# Patient Record
Sex: FEMALE | Race: WHITE | Employment: OTHER | URBAN - NONMETROPOLITAN AREA
[De-identification: names, ages, dates, MRNs, and addresses within clinical notes are randomized per-mention and may not be internally consistent; named-entity substitution may affect disease eponyms.]

---

## 2017-01-02 ENCOUNTER — COMMUNICATION - GICH (OUTPATIENT)
Dept: FAMILY MEDICINE | Facility: OTHER | Age: 67
End: 2017-01-02

## 2017-01-02 DIAGNOSIS — K21.9 GASTRO-ESOPHAGEAL REFLUX DISEASE WITHOUT ESOPHAGITIS: ICD-10-CM

## 2017-01-06 ENCOUNTER — OFFICE VISIT - GICH (OUTPATIENT)
Dept: FAMILY MEDICINE | Facility: OTHER | Age: 67
End: 2017-01-06

## 2017-01-06 ENCOUNTER — HISTORY (OUTPATIENT)
Dept: FAMILY MEDICINE | Facility: OTHER | Age: 67
End: 2017-01-06

## 2017-01-06 DIAGNOSIS — H69.90: ICD-10-CM

## 2017-01-06 DIAGNOSIS — F33.9 RECURRENT MAJOR DEPRESSIVE DISORDER (H): ICD-10-CM

## 2017-01-06 DIAGNOSIS — Z91.89 OTHER SPECIFIED PERSONAL RISK FACTORS, NOT ELSEWHERE CLASSIFIED: ICD-10-CM

## 2017-01-06 DIAGNOSIS — G47.9 SLEEP DISORDER: ICD-10-CM

## 2017-01-06 DIAGNOSIS — Z00.00 ENCOUNTER FOR GENERAL ADULT MEDICAL EXAMINATION WITHOUT ABNORMAL FINDINGS: ICD-10-CM

## 2017-01-06 DIAGNOSIS — E78.2 MIXED HYPERLIPIDEMIA: ICD-10-CM

## 2017-01-06 DIAGNOSIS — Z23 ENCOUNTER FOR IMMUNIZATION: ICD-10-CM

## 2017-01-06 LAB
A/G RATIO - HISTORICAL: 1.8 (ref 1–2)
ALBUMIN SERPL-MCNC: 4.4 G/DL (ref 3.5–5.7)
ALP SERPL-CCNC: 67 IU/L (ref 34–104)
ALT (SGPT) - HISTORICAL: 25 IU/L (ref 7–52)
ANION GAP - HISTORICAL: 8 (ref 5–18)
AST SERPL-CCNC: 22 IU/L (ref 13–39)
BILIRUB SERPL-MCNC: 0.9 MG/DL (ref 0.3–1)
BUN SERPL-MCNC: 16 MG/DL (ref 7–25)
BUN/CREAT RATIO - HISTORICAL: 18
CALCIUM SERPL-MCNC: 9.4 MG/DL (ref 8.6–10.3)
CHLORIDE SERPLBLD-SCNC: 105 MMOL/L (ref 98–107)
CHOL/HDL RATIO - HISTORICAL: 3.55
CHOLESTEROL TOTAL: 206 MG/DL
CO2 SERPL-SCNC: 28 MMOL/L (ref 21–31)
CREAT SERPL-MCNC: 0.91 MG/DL (ref 0.7–1.3)
GFR IF NOT AFRICAN AMERICAN - HISTORICAL: >60 ML/MIN/1.73M2
GLOBULIN - HISTORICAL: 2.4 G/DL (ref 2–3.7)
GLUCOSE SERPL-MCNC: 85 MG/DL (ref 70–105)
HDLC SERPL-MCNC: 58 MG/DL (ref 23–92)
LDLC SERPL CALC-MCNC: 123 MG/DL
NON-HDL CHOLESTEROL - HISTORICAL: 148 MG/DL
PATIENT STATUS - HISTORICAL: ABNORMAL
POTASSIUM SERPL-SCNC: 4 MMOL/L (ref 3.5–5.1)
PROT SERPL-MCNC: 6.8 G/DL (ref 6.4–8.9)
SODIUM SERPL-SCNC: 141 MMOL/L (ref 133–143)
TRIGL SERPL-MCNC: 125 MG/DL

## 2017-01-06 ASSESSMENT — PATIENT HEALTH QUESTIONNAIRE - PHQ9: SUM OF ALL RESPONSES TO PHQ QUESTIONS 1-9: 1

## 2017-02-03 ENCOUNTER — COMMUNICATION - GICH (OUTPATIENT)
Dept: FAMILY MEDICINE | Facility: OTHER | Age: 67
End: 2017-02-03

## 2017-05-01 ENCOUNTER — AMBULATORY - GICH (OUTPATIENT)
Dept: SCHEDULING | Facility: OTHER | Age: 67
End: 2017-05-01

## 2017-06-01 ENCOUNTER — COMMUNICATION - GICH (OUTPATIENT)
Dept: FAMILY MEDICINE | Facility: OTHER | Age: 67
End: 2017-06-01

## 2017-06-01 DIAGNOSIS — F33.9 RECURRENT MAJOR DEPRESSIVE DISORDER (H): ICD-10-CM

## 2017-06-05 ENCOUNTER — COMMUNICATION - GICH (OUTPATIENT)
Dept: FAMILY MEDICINE | Facility: OTHER | Age: 67
End: 2017-06-05

## 2017-06-05 DIAGNOSIS — F33.9 RECURRENT MAJOR DEPRESSIVE DISORDER (H): ICD-10-CM

## 2017-06-09 ENCOUNTER — COMMUNICATION - GICH (OUTPATIENT)
Dept: FAMILY MEDICINE | Facility: OTHER | Age: 67
End: 2017-06-09

## 2017-07-03 ENCOUNTER — COMMUNICATION - GICH (OUTPATIENT)
Dept: FAMILY MEDICINE | Facility: OTHER | Age: 67
End: 2017-07-03

## 2017-07-03 DIAGNOSIS — H69.90: ICD-10-CM

## 2017-09-23 ENCOUNTER — COMMUNICATION - GICH (OUTPATIENT)
Dept: FAMILY MEDICINE | Facility: OTHER | Age: 67
End: 2017-09-23

## 2017-09-23 DIAGNOSIS — K21.9 GASTRO-ESOPHAGEAL REFLUX DISEASE WITHOUT ESOPHAGITIS: ICD-10-CM

## 2017-11-03 ENCOUNTER — HISTORY (OUTPATIENT)
Dept: RADIOLOGY | Facility: OTHER | Age: 67
End: 2017-11-03

## 2017-11-03 ENCOUNTER — HOSPITAL ENCOUNTER (OUTPATIENT)
Dept: RADIOLOGY | Facility: OTHER | Age: 67
End: 2017-11-03
Attending: FAMILY MEDICINE

## 2017-11-03 ENCOUNTER — AMBULATORY - GICH (OUTPATIENT)
Dept: FAMILY MEDICINE | Facility: OTHER | Age: 67
End: 2017-11-03

## 2017-11-03 DIAGNOSIS — Z12.31 ENCOUNTER FOR SCREENING MAMMOGRAM FOR MALIGNANT NEOPLASM OF BREAST: ICD-10-CM

## 2017-11-03 DIAGNOSIS — Z23 ENCOUNTER FOR IMMUNIZATION: ICD-10-CM

## 2017-12-23 ENCOUNTER — COMMUNICATION - GICH (OUTPATIENT)
Dept: FAMILY MEDICINE | Facility: OTHER | Age: 67
End: 2017-12-23

## 2017-12-23 DIAGNOSIS — K21.9 GASTRO-ESOPHAGEAL REFLUX DISEASE WITHOUT ESOPHAGITIS: ICD-10-CM

## 2017-12-27 NOTE — PROGRESS NOTES
Patient Information     Patient Name MRN Sex Afia Cates [id number] Female 1950      Progress Notes by Chantal Cox at 11/3/2017 10:14 AM     Author:  Chantal Cox Service:  (none) Author Type:  (none)     Filed:  11/3/2017 10:14 AM Date of Service:  11/3/2017 10:14 AM Status:  Signed     :  Chantal Cox            Falls Risk Criteria:    Age 65 and older or under age 4        Sensory deficits    Poor vision    Use of ambulatory aides    Impaired judgment    Unable to walk independently    Meets High Risk criteria for falls:  Yes               1.  Do you have dizziness or vertigo?    no                    2.  Do you need help standing or walking?   no                 3.  Have you fallen within the last 6 months?    no           4.  Has the patient been fasting?      no       If any risks are marked Yes, the following interventions are utilized:    Do not leave patient unattended     Assist patient in the dressing room and bathroom    Have ambulatory aides available throughout procedure    Involve patient s family if available

## 2017-12-28 NOTE — TELEPHONE ENCOUNTER
Patient Information     Patient Name MRN Afia Hardy [id number] Female 1950      Telephone Encounter by Vlad Luevano RN at 2017 10:59 AM     Author:  Vlad Luevano RN Service:  (none) Author Type:  NURS- Registered Nurse     Filed:  2017 11:04 AM Encounter Date:  2017 Status:  Signed     :  Vlad Luevano RN (NURS- Registered Nurse)            Refill request received from [facility] for patient's Ambien. Chart review shows that rx was filled as noted below and wouldn't be due for refill until 17.    Prescribing Provider: Joan Joshi MD            Order Date: 2017  Ordered by: JOAN JOSHI  Medication:zolpidem (AMBIEN) 10 mg tablet    Qty:90 tablet   Ref:1  Start:2017    End:              Route:Oral                  RUBEN:No   Class:Print    Sig:Take 1 tablet by mouth at bedtime if needed for Sleep.    Pharmacy:[place] [address] [place] [address]    Faxed rx request from pharmacy also shows a last fill date of 16. Call placed to [facility]. Spoke to radames Hernandez. radames Hernandez notes rx as listed above. Is able to fill rx as requested by patient off of script as noted above. radames Hernandez states nothing further needed at this time. Writer will disregard rx request at this time and close this encounter.    Unable to complete prescription refill per RN Medication Refill Policy.................... Vlad Luevano RN ....................  2017   11:03 AM

## 2017-12-28 NOTE — TELEPHONE ENCOUNTER
Patient Information     Patient Name MRN Sex Afia Cates [id number] Female 1950      Telephone Encounter by She Danielle RN at 7/3/2017  9:43 AM     Author:  She Danielle RN Service:  (none) Author Type:  NURS- Registered Nurse     Filed:  7/3/2017  9:46 AM Encounter Date:  7/3/2017 Status:  Signed     :  She Danielle RN (NURS- Registered Nurse)            This is a Refill request from: [facility]  Name of Medication: flonase  Quantity requested: 16gm  Last fill date: 17  Due for refill: unsure  Last visit with JOAN JOSHI was on: 2017 in [facility]  PCP:  Joan Joshi MD  Controlled Substance Agreement:  na   Diagnosis r/t this medication request: eustachian tube dysfunction    Patient was given one bottle at PX 17 and told to use at bedtime for 3-4 weeks. Now requesting refill on medication to Joan Joshi MD to advise     Unable to complete prescription refill per RN Medication Refill Policy.................... SHE DANIELLE RN ....................  7/3/2017   9:43 AM

## 2017-12-28 NOTE — TELEPHONE ENCOUNTER
Patient Information     Patient Name MRN Sex Afia Cates [id number] Female 1950      Telephone Encounter by Margarita Campbell RN at 2017 11:53 AM     Author:  Margarita Campbell RN Service:  (none) Author Type:  NURS- Registered Nurse     Filed:  2017 11:58 AM Encounter Date:  2017 Status:  Signed     :  Margarita Campbell RN (NURS- Registered Nurse)            Refill request inappropriate. Filled 17 90 x 3. Pharmacy alerted. Unable to complete prescription refill per RN Medication Refill Policy.................... Margarita Campbell RN ....................  2017   11:56 AM    Prescribing Provider: Joan Joshi MD            Order Date: 2017  Ordered by: JOAN JOSHI  Medication:citalopram (CELEXA) 40 mg tablet    Qty:90 tablet   Ref:3  Start:2017    End:              Route:Oral                  RUBEN:No   Class:eRx    Sig:Take 1 tablet by mouth at bedtime.    Pharmacy:[facility] [address] [place] [address]

## 2017-12-28 NOTE — TELEPHONE ENCOUNTER
Patient Information     Patient Name MRN Sex Afia Cates [id number] Female 1950      Telephone Encounter by Vlad Luevano RN at 2017  9:22 AM     Author:  Vlad Luevano RN Service:  (none) Author Type:  NURS- Registered Nurse     Filed:  2017  9:26 AM Encounter Date:  2017 Status:  Signed     :  Vlad Luevano RN (NURS- Registered Nurse)            Proton Pump Inhibitors    Office visit in the past 12 months or per provider note.    Last visit with HUE JOSHI was on: 2017 in [facility]  Next visit with HUE JOSHI is on: No future appointment listed with this provider  Next visit with Family Practice is on: No future appointment listed in this department    Max refill for 12 months from last office visit or per provider note.    Chart review shows that patient was seen for a physical with PCP on 17. Rx as requested was noted and reviewed in office visit notes on that date. No changes noted with rx as requested. Patient will be due for annual office visit in 2018. Writer will refill rx as requested for a 90 day supply at this time.    Prescription refilled per RN Medication Refill Policy.................... Vlad Luevano RN ....................  2017   9:25 AM

## 2017-12-28 NOTE — TELEPHONE ENCOUNTER
Patient Information     Patient Name MRN Sex Afia Cates [id number] Female 1950      Telephone Encounter by Margarita Campbell RN at 2017 10:31 AM     Author:  Margarita Campbell RN Service:  (none) Author Type:  NURS- Registered Nurse     Filed:  2017 10:34 AM Encounter Date:  2017 Status:  Signed     :  Margarita Campbell RN (NURS- Registered Nurse)            Refill request inappropriate. Filled 17 90 x 3. Pharmacy alerted. Unable to complete prescription refill per RN Medication Refill Policy.................... Margarita Campbell RN ....................  2017   10:34 AM    Prescribing Provider: Joan Joshi MD            Order Date: 2017  Ordered by: JOAN JOSHI  Medication:citalopram (CELEXA) 40 mg tablet    Qty:90 tablet   Ref:3  Start:2017    End:              Route:Oral                  RUBEN:No   Class:eRx    Sig:Take 1 tablet by mouth at bedtime.    Pharmacy:[facility] [address] [place] [address]

## 2018-01-02 NOTE — NURSING NOTE
Patient Information     Patient Name MRN Sex Afia Cates [id number] Female 1950      Nursing Note by Estefanía Hastings at 2017 10:00 AM     Author:  Estefanía Hastings Service:  (none) Author Type:  (none)     Filed:  2017 10:19 AM Encounter Date:  2017 Status:  Signed     :  Estefanía Hastings            Patient is here for annual physical, and right ear/jaw is bothering her. States hurts when she opens her mouth.  Estefanía Hastings LPN .............2017  9:54 AM

## 2018-01-02 NOTE — PATIENT INSTRUCTIONS
Patient Information     Patient Name MRN Sex Afia Cates [id number] Female 1950      Patient Instructions by Joan Velazquez MD at 2017 10:00 AM     Author:  Joan Velazquez MD Service:  (none) Author Type:  Physician     Filed:  2017 10:40 AM Encounter Date:  2017 Status:  Signed     :  Joan Velazquez MD (Physician)            Labs today  Will determine dosage of zocor based on lipid panel today  Recommend pneumonia vaccine and booster next year  Schedule mammogram in the spring  Increase weight bearing exercise, 30 minutes daily of moderate paced walking  Left middle ear effusion, recommend flonase nasal spray 2 sprays at bedtime x 3-4 weeks

## 2018-01-02 NOTE — TELEPHONE ENCOUNTER
Patient Information     Patient Name MRN Afia Hardy [id number] Female 1950      Telephone Encounter by Vlad Luevano RN at 2017  2:54 PM     Author:  Vlad Luevano RN Service:  (none) Author Type:  NURS- Registered Nurse     Filed:  2017  2:58 PM Encounter Date:  2017 Status:  Signed     :  Vlad Luevano RN (NURS- Registered Nurse)            Proton Pump Inhibitors    Office visit in the past 12 months or per provider note.    Last visit with HUE JOSHI was on: 10/14/2016 in [facility]  Next visit with HUE JOSHI is on: 2017 in [facility]  Next visit with Family Practice is on: 2017 in [facility]    Max refill for 12 months from last office visit or per provider note.    Rx was initiated per chart review on 10/7/2016. Will refill as indicated.    Prescription refilled per RN Medication Refill Policy.................... Vlad Luevano RN ....................  2017   2:56 PM

## 2018-01-02 NOTE — NURSING NOTE
Patient Information     Patient Name MRN Sex Afia Cates [id number] Female 1950      Nursing Note by Estefanía Hastings at 2017 10:00 AM     Author:  Estefanía Hastings Service:  (none) Author Type:  (none)     Filed:  2017 10:19 AM Encounter Date:  2017 Status:  Signed     :  Estefanía Hastings            Patient presents today for WELCOME to MEDICARE physical.      1. Have you fallen in the past 12 months? no  2. Do you currently drive an automobile? YES  3. If yes, have you had any accidents in the past 12 months? no        Estefanía Hastings LPN .............2017  9:54 AM

## 2018-01-02 NOTE — PROGRESS NOTES
Patient Information     Patient Name MRN Sex     Afia Moise [id number] Female 1950      Progress Notes by Joan Velazquez MD at 2017 10:00 AM     Author:  Joan Velazquez MD Service:  (none) Author Type:  Physician     Filed:  2017 11:57 AM Encounter Date:  2017 Status:  Signed     :  Joan Velazquez MD (Physician)            ANNUAL EXAM ADULT FEMALE - GYN    Afia Moise is a 66 y.o. female, G 2, P 2, here today for her annual gynecologic exam.  HPI:  Presents for annual exam and review of medical problems. Complains of bilateral ear pressure, worse x 2 weeks. Had cold earlier this winter and those symptoms have resolved.  She has decreased zocor last year and needs repeat lipids today.  [place]  She takes ca/vit d    CURRENT MEDICATIONS:  Current Outpatient Prescriptions       Medication  Sig Dispense Refill     aspirin (ECOTRIN) 81 mg enteric coated tablet Take 1 tablet by mouth once daily with a meal.  0     Calcium-Cholecalciferol, D3, (CALTRATE 600 + D) 600 mg(1,500mg) -400 unit Chew Take 1 tablet by mouth once daily.       cholecalciferol (VITAMIN D) 1,000 unit tablet Take 1,000 Units by mouth once daily.       citalopram (CELEXA) 40 mg tablet Take 1 tablet by mouth at bedtime. 90 tablet 3     fluticasone (50 mcg per actuation) nasal solution (FLONASE) Inhale 1 Spray into both nostrils once daily. 1 Bottle 0     glucosamine-chondroit-vit c-mn (GLUCOSAMINE-CHONDROITIN COMPLX) 500-400 mg cap Take one tablet by mouth daily       metroNIDAZOLE (METROGEL) 0.75 % gel Apply  topically to affected area(s) 2 times daily. 45 g 11     omega-3 fatty acids-vitamin E (FISH OIL) 1,000 mg cap Take 1 capsule by mouth once daily.  0     omeprazole (PRILOSEC) 20 mg Delayed-Release capsule Take one capsule by mouth once daily before a meal. 90 capsule 2     simvastatin (ZOCOR) 10 mg tablet Take 1 tablet by mouth at  bedtime. 90 tablet 3     triamcinolone (ARISTOCORT; KENALOG) 0.1 % cream Apply  topically to affected area(s) 3 times daily. 1 Tube 0     zolpidem (AMBIEN) 10 mg tablet Take 1 tablet by mouth at bedtime if needed for Sleep. 90 tablet 1     No current facility-administered medications for this visit.      Medications have been reviewed by me and are current to the best of my knowledge and ability.      ALLERGIES:  Review of patient's allergies indicates no known allergies.     Past Medical History      Diagnosis   Date     Basal cell carcinoma of nose       Dyslipidemia       lowered zocor 2015      Osteopenia  11/2014     DEXA,  t score -1.7, ca/vit d      Seasonal affective disorder (SAD)       Sleep disorder         Past Surgical History       Procedure   Laterality Date     Partial hysterectomy        secondary to DUB. Ovaries in place. was on HRT, stopped patch in 7/2011       Colonoscopy screening   2003     diverticulosis       Colonoscopy screening   4/24/2014               SOCIAL HISTORY:  Social History     Social History        Marital status:       Spouse name: N/A     Number of children:  N/A     Years of education:  N/A     Occupational History      Not on file.     Social History Main Topics        Smoking status:  Never Smoker     Smokeless tobacco:  Never Used     Alcohol use  No     Drug use:  No     Sexual activity:  Not Currently     Partners: Male     Other Topics  Concern      Service No     Blood Transfusions No     Caffeine Concern No     Occupational Exposure No     Hobby Hazards No     Sleep Concern No     Stress Concern No     Weight Concern No     Special Diet No     Back Care No     Exercise Yes     Bike Helmet No     Seat Belt Yes     Self-Exams Yes     Social History Narrative     , remarried in 5/10, moved into his house 10/10,  2 adult children, 5 grandchildren, works as  of [employer].  Enjoys working with flowers,  mentee in  "[employer].      3 yr twin granddaughter live in [place].                                                        Family History       Problem   Relation Age of Onset     Diabetes  Father      Hypertension  Father      Heart Disease  Father      Osteoporosis  Mother      compression fracture       Heart Disease  Mother      Other  Other      negative breast cancer       Cancer-breast  No Family History        RISK FACTORS  Social History     Substance Use Topics       Smoking status: Never Smoker     Smokeless tobacco: Never Used     Alcohol use No      Exercise Times/wk: 3  Seat Belt Use: 100%  Birth Control Method: none  High Risk/Behavior: none    PREVENTIVE SERVICES  Preventive services were reviewed today, January 12, 2017.    LMP: No LMP recorded. Patient has had a hysterectomy.  Menstrual Regularity: previous hysterectomy  Flow: not applicable    ROS  Negative for Review of Systems not covered in the HPI.    PHYSICAL EXAM  /84  Pulse 78  Ht 1.6 m (5' 3\")  Wt 70.9 kg (156 lb 6.4 oz)  BMI 27.71 kg/m2  General Appearance:  Alert, appropriate appearance for age. No acute distress  HEENT Exam:  Head:  Normocephalic, no lesions, without obvious abnormality.  Eye:  Normal external eye, conjunctiva, lids cornea, CAPO.  Ears:  ear pain, bilateral, TM retracted bilaterally  Nose:  Normal external nose, mucus membranes and septum.  Neck / Thyroid:  Supple, no masses, nodes, nodules or enlargement.  Neck / Thyroid Exam:  Supple, no masses, nodes or enlargement.  Chest/Respiratory Exam: Normal chest wall and respirations. Clear to auscultation.  Breast Exam: No dimpling, nipple retraction or discharge. No masses or nodes.  Cardiovascular Exam: Regular rate and rhythm. S1, S2, no murmur, click, gallop, or rubs.  Gastrointestinal Exam: Soft, non-tender, no masses or organomegaly.  Lymphatic Exam: Non-palpable nodes in neck, clavicular, axillary, or inguinal regions.  Musculoskeletal Exam: Back is straight and non-tender, full " ROM of upper and lower extremities.  Skin: no rash or abnormalities  Neurologic Exam: Normal gait and speech, no tremor.  Psychiatric Exam: Alert and oriented, appropriate affect.  Clinical staff offered to be present for exam: yes, initials of staff present:   PHQ Depression Screening 1/6/2017   Date of PHQ exam (doc flow) 1/6/2017   1. Lack of interest/pleasure 0 - Not at all   2. Feeling down/depressed 0 - Not at all   PHQ-2 TOTAL SCORE 0   3. Trouble sleeping 1 - Several days   4. Decreased energy 0 - Not at all   5. Appetite change 0 - Not at all   6. Feelings of failure 0 - Not at all   7. Trouble concentrating 0 - Not at all   8. Activity level 0 - Not at all   9. Hurting yourself 0 - Not at all   PHQ-9 TOTAL SCORE 1   PHQ-9 Severity Level none   Functional Impairment not difficult at all     Results for orders placed or performed in visit on 01/06/17       LIPID PANEL       Result  Value Ref Range Status    CHOLESTEROL,TOTAL 206 (H) <200 mg/dL Final    TRIGLYCERIDES 125 <150 mg/dL Final    HDL CHOLESTEROL 58 23 - 92 mg/dL Final    NON-HDL CHOLESTEROL 148 (H) <145 mg/dl Final    CHOL/HDL RATIO            3.55 <4.50                 Final    LDL CHOLESTEROL 123 (H) <100 mg/dL Final    PATIENT STATUS            FASTING                 Final   COMP METABOLIC PANEL       Result  Value Ref Range Status    SODIUM 141 133 - 143 mmol/L Final    POTASSIUM 4.0 3.5 - 5.1 mmol/L Final    CHLORIDE 105 98 - 107 mmol/L Final    CO2,TOTAL 28 21 - 31 mmol/L Final    ANION GAP 8 5 - 18                 Final    GLUCOSE 85 70 - 105 mg/dL Final    CALCIUM 9.4 8.6 - 10.3 mg/dL Final    BUN 16 7 - 25 mg/dL Final    CREATININE 0.91 0.70 - 1.30 mg/dL Final    BUN/CREAT RATIO           18                 Final    GFR if African American >60 >60 ml/min/1.73m2 Final    GFR if not African American >60 >60 ml/min/1.73m2 Final    ALBUMIN 4.4 3.5 - 5.7 g/dL Final    PROTEIN,TOTAL 6.8 6.4 - 8.9 g/dL Final    GLOBULIN                  2.4 2.0  - 3.7 g/dL Final    A/G RATIO 1.8 1.0 - 2.0                 Final    BILIRUBIN,TOTAL 0.9 0.3 - 1.0 mg/dL Final    ALK PHOSPHATASE 67 34 - 104 IU/L Final    ALT (SGPT) 25 7 - 52 IU/L Final    AST (SGOT) 22 13 - 39 IU/L Final         ASSESSMENT/PLAN    ICD-10-CM    1. Annual physical exam Z00.00    2. Mixed dyslipidemia E78.2 LIPID PANEL      COMP METABOLIC PANEL      LIPID PANEL      COMP METABOLIC PANEL      simvastatin (ZOCOR) 10 mg tablet      DISCONTINUED: simvastatin (ZOCOR) 20 mg tablet   3. Disturbance in sleep behavior G47.9 zolpidem (AMBIEN) 10 mg tablet   4. Recurrent major depressive disorder, remission status unspecified F33.9 citalopram (CELEXA) 40 mg tablet   5. Eustachian tube disorder, unspecified laterality H69.90 fluticasone (50 mcg per actuation) nasal solution (FLONASE)   6. Pneumococcal vaccination indicated Z91.89 PNEUMOCOCCAL VACCINE 23-VALENT => 3 YO IM      NH ADMIN VACC INITIAL   7. Encounter for immunization  Z23 PNEUMOCOCCAL VACCINE 23-VALENT => 3 YO IM     Ms. Moise's Body mass index is 27.71 kg/(m^2). This is within the normal range for a 65 y.o. Normal range for ages 18-64 is between 18.5 and 24.9; normal range for ages 65+ is 23-30.   BP Readings from Last 1 Encounters:01/06/17 : 118/84  Ms. Musa blood pressure is out of the normal range for adults. Per JNC-8 guidelines normal adult blood pressure is < 120/80, pre-hypertensive is between 120/80 and 139/89, and hypertension is 140/90 or greater. Risks of hypertension were discussed. Patient's strategy will be increased activity  No longer needs screening pap smears   other preventive cares are current  Patient is counseled on importance of regular self breast exams, annual mammogram starting at the age of 40.  Discussed the importance of calcium and vitamin D supplementation and screening for osteoporosis. Immunizations are updated. Reviewed the importance of sunscreen, seatbelt and helmet use.    Patient Instructions   Labs  today  Will determine dosage of zocor based on lipid panel today  Recommend pneumonia vaccine and booster next year  Schedule mammogram in the spring  Increase weight bearing exercise, 30 minutes daily of moderate paced walking  Left middle ear effusion, recommend flonase nasal spray 2 sprays at bedtime x 3-4 weeks  Joan Raygoza MD  11:52 AM 1/12/2017

## 2018-01-03 NOTE — TELEPHONE ENCOUNTER
Patient Information     Patient Name MRN Sex Afia Cates [id number] Female 1950      Telephone Encounter by Vlad Luevano RN at 2/3/2017  1:07 PM     Author:  Vlad Luevano RN Service:  (none) Author Type:  NURS- Registered Nurse     Filed:  2/3/2017  1:15 PM Encounter Date:  2/3/2017 Status:  Signed     :  Vlad Luevano RN (NURS- Registered Nurse)            Received faxed refill request from pharmacy with regards to patient's simvastatin 20 mg tablets. Last fill date per rx request was on 11/3/2016. Chart review shows that simvastatin 20 mg tabs discontinued at 2017 office visit. Current rx in chart as follows:    Prescribing Provider: Joan Joshi MD          Order Date: 2017  Ordered by: JOAN JOSHI  Medication:simvastatin (ZOCOR) 10 mg tablet    Qty:90 tablet   Ref:3  Start:2017    End:              Route:Oral                  RUBEN:No   Class:eRx    Sig:Take 1 tablet by mouth at bedtime.    [facility]    Call placed to [facility] pharmacy. Spoke with Omar, pharmacist. Advised her of above information. Omar will inactivate rx for 20 mg tabs in chart and fill new rx for 10 mg tabs. No further clarification needed.    Unable to complete prescription refill per RN Medication Refill Policy.................... Vlad Luevano RN ....................  2/3/2017   1:15 PM

## 2018-01-16 ENCOUNTER — HISTORY (OUTPATIENT)
Dept: FAMILY MEDICINE | Facility: OTHER | Age: 68
End: 2018-01-16

## 2018-01-16 ENCOUNTER — OFFICE VISIT - GICH (OUTPATIENT)
Dept: FAMILY MEDICINE | Facility: OTHER | Age: 68
End: 2018-01-16

## 2018-01-16 DIAGNOSIS — F33.9 RECURRENT MAJOR DEPRESSIVE DISORDER (H): ICD-10-CM

## 2018-01-16 DIAGNOSIS — E78.2 MIXED HYPERLIPIDEMIA: ICD-10-CM

## 2018-01-16 DIAGNOSIS — M94.9 DISORDER OF CARTILAGE: ICD-10-CM

## 2018-01-16 DIAGNOSIS — Z00.00 ENCOUNTER FOR GENERAL ADULT MEDICAL EXAMINATION WITHOUT ABNORMAL FINDINGS: ICD-10-CM

## 2018-01-16 DIAGNOSIS — R53.83 OTHER FATIGUE: ICD-10-CM

## 2018-01-16 DIAGNOSIS — Z23 ENCOUNTER FOR IMMUNIZATION: ICD-10-CM

## 2018-01-16 DIAGNOSIS — M89.9 DISORDER OF BONE: ICD-10-CM

## 2018-01-16 DIAGNOSIS — G47.9 SLEEP DISORDER: ICD-10-CM

## 2018-01-16 DIAGNOSIS — L71.9 ROSACEA: ICD-10-CM

## 2018-01-16 LAB
A/G RATIO - HISTORICAL: 1.4 (ref 1–2)
ABSOLUTE BASOPHILS - HISTORICAL: 0.1 THOU/CU MM
ABSOLUTE EOSINOPHILS - HISTORICAL: 0.1 THOU/CU MM
ABSOLUTE IMMATURE GRANULOCYTES(METAS,MYELOS,PROS) - HISTORICAL: 0 THOU/CU MM
ABSOLUTE LYMPHOCYTES - HISTORICAL: 1.4 THOU/CU MM (ref 0.9–2.9)
ABSOLUTE MONOCYTES - HISTORICAL: 0.5 THOU/CU MM
ABSOLUTE NEUTROPHILS - HISTORICAL: 3.1 THOU/CU MM (ref 1.7–7)
ALBUMIN SERPL-MCNC: 4.3 G/DL (ref 3.5–5.7)
ALP SERPL-CCNC: 67 IU/L (ref 34–104)
ALT (SGPT) - HISTORICAL: 13 IU/L (ref 7–52)
ANION GAP - HISTORICAL: 11 (ref 5–18)
AST SERPL-CCNC: 17 IU/L (ref 13–39)
BASOPHILS # BLD AUTO: 1.2 %
BILIRUB SERPL-MCNC: 1 MG/DL (ref 0.3–1)
BUN SERPL-MCNC: 19 MG/DL (ref 7–25)
BUN/CREAT RATIO - HISTORICAL: 21
CALCIUM SERPL-MCNC: 9.1 MG/DL (ref 8.6–10.3)
CHLORIDE SERPLBLD-SCNC: 105 MMOL/L (ref 98–107)
CHOL/HDL RATIO - HISTORICAL: 3.25
CHOLESTEROL TOTAL: 169 MG/DL
CO2 SERPL-SCNC: 23 MMOL/L (ref 21–31)
CREAT SERPL-MCNC: 0.89 MG/DL (ref 0.7–1.3)
EOSINOPHIL NFR BLD AUTO: 2 %
ERYTHROCYTE [DISTWIDTH] IN BLOOD BY AUTOMATED COUNT: 12.2 % (ref 11.5–15.5)
GFR IF NOT AFRICAN AMERICAN - HISTORICAL: >60 ML/MIN/1.73M2
GLOBULIN - HISTORICAL: 3 G/DL (ref 2–3.7)
GLUCOSE SERPL-MCNC: 102 MG/DL (ref 70–105)
HCT VFR BLD AUTO: 44.4 % (ref 33–51)
HDLC SERPL-MCNC: 52 MG/DL (ref 23–92)
HEMOGLOBIN: 15.4 G/DL (ref 12–16)
IMMATURE GRANULOCYTES(METAS,MYELOS,PROS) - HISTORICAL: 0.2 %
LDLC SERPL CALC-MCNC: 96 MG/DL
LYMPHOCYTES NFR BLD AUTO: 26.9 % (ref 20–44)
MCH RBC QN AUTO: 30.9 PG (ref 26–34)
MCHC RBC AUTO-ENTMCNC: 34.7 G/DL (ref 32–36)
MCV RBC AUTO: 89 FL (ref 80–100)
MONOCYTES NFR BLD AUTO: 9.2 %
NEUTROPHILS NFR BLD AUTO: 60.5 % (ref 42–72)
NON-HDL CHOLESTEROL - HISTORICAL: 117 MG/DL
PLATELET # BLD AUTO: 188 THOU/CU MM (ref 140–440)
PMV BLD: 10 FL (ref 6.5–11)
POTASSIUM SERPL-SCNC: 4 MMOL/L (ref 3.5–5.1)
PROT SERPL-MCNC: 7.3 G/DL (ref 6.4–8.9)
PROVIDER ORDERDED STATUS - HISTORICAL: NORMAL
RED BLOOD COUNT - HISTORICAL: 4.98 MIL/CU MM (ref 4–5.2)
SODIUM SERPL-SCNC: 139 MMOL/L (ref 133–143)
TRIGL SERPL-MCNC: 107 MG/DL
TSH - HISTORICAL: 1.1 UIU/ML (ref 0.34–5.6)
WHITE BLOOD COUNT - HISTORICAL: 5.1 THOU/CU MM (ref 4.5–11)

## 2018-01-16 ASSESSMENT — PATIENT HEALTH QUESTIONNAIRE - PHQ9: SUM OF ALL RESPONSES TO PHQ QUESTIONS 1-9: 5

## 2018-01-26 VITALS
WEIGHT: 156.4 LBS | SYSTOLIC BLOOD PRESSURE: 118 MMHG | BODY MASS INDEX: 27.71 KG/M2 | HEART RATE: 78 BPM | HEIGHT: 63 IN | DIASTOLIC BLOOD PRESSURE: 84 MMHG

## 2018-01-29 ENCOUNTER — HOSPITAL ENCOUNTER (OUTPATIENT)
Dept: RADIOLOGY | Facility: OTHER | Age: 68
End: 2018-01-29
Attending: FAMILY MEDICINE

## 2018-01-29 DIAGNOSIS — M94.9 DISORDER OF CARTILAGE: ICD-10-CM

## 2018-01-29 DIAGNOSIS — M89.9 DISORDER OF BONE: ICD-10-CM

## 2018-01-31 ENCOUNTER — DOCUMENTATION ONLY (OUTPATIENT)
Dept: FAMILY MEDICINE | Facility: OTHER | Age: 68
End: 2018-01-31

## 2018-01-31 PROBLEM — F32.9 MDD (MAJOR DEPRESSIVE DISORDER): Status: ACTIVE | Noted: 2018-01-31

## 2018-01-31 RX ORDER — TRIAMCINOLONE ACETONIDE 1 MG/G
CREAM TOPICAL
COMMUNITY
Start: 2014-03-27

## 2018-01-31 RX ORDER — CITALOPRAM HYDROBROMIDE 40 MG/1
40 TABLET ORAL AT BEDTIME
COMMUNITY
Start: 2017-01-06 | End: 2018-03-23

## 2018-01-31 RX ORDER — SIMVASTATIN 10 MG
10 TABLET ORAL AT BEDTIME
COMMUNITY
Start: 2017-01-06 | End: 2018-03-23

## 2018-01-31 RX ORDER — CHLORAL HYDRATE 500 MG
1 CAPSULE ORAL DAILY
COMMUNITY
Start: 2013-12-05 | End: 2019-01-31

## 2018-01-31 RX ORDER — FLUTICASONE PROPIONATE 50 MCG
1 SPRAY, SUSPENSION (ML) NASAL DAILY
COMMUNITY
Start: 2017-07-06 | End: 2018-10-09

## 2018-01-31 RX ORDER — METRONIDAZOLE 7.5 MG/G
GEL TOPICAL
COMMUNITY
Start: 2016-10-07 | End: 2020-04-27

## 2018-01-31 RX ORDER — ASPIRIN 81 MG/1
81 TABLET ORAL
COMMUNITY
Start: 2014-12-30 | End: 2021-08-26

## 2018-01-31 RX ORDER — ZOLPIDEM TARTRATE 10 MG/1
10 TABLET ORAL
COMMUNITY
Start: 2017-01-06 | End: 2018-10-09

## 2018-01-31 ASSESSMENT — PATIENT HEALTH QUESTIONNAIRE - PHQ9: SUM OF ALL RESPONSES TO PHQ QUESTIONS 1-9: 1

## 2018-02-09 VITALS
HEART RATE: 84 BPM | BODY MASS INDEX: 26.47 KG/M2 | DIASTOLIC BLOOD PRESSURE: 78 MMHG | HEIGHT: 63 IN | WEIGHT: 149.4 LBS | SYSTOLIC BLOOD PRESSURE: 102 MMHG

## 2018-02-11 ASSESSMENT — PATIENT HEALTH QUESTIONNAIRE - PHQ9: SUM OF ALL RESPONSES TO PHQ QUESTIONS 1-9: 5

## 2018-02-12 NOTE — TELEPHONE ENCOUNTER
Patient Information     Patient Name MRN Sex Afia Cates [id number] Female 1950      Telephone Encounter by Vlad Luevano RN at 2017  9:00 AM     Author:  Vlad Luevano RN Service:  (none) Author Type:  NURS- Registered Nurse     Filed:  2017  9:24 AM Encounter Date:  2017 Status:  Signed     :  Vlad Luevano RN (NURS- Registered Nurse)            Proton Pump Inhibitors    Office visit in the past 12 months or per provider note.    Last visit with HUE JOSHI was on: 2017 in [facility]  Next visit with HUE JOSHI is on: No future appointment listed with this provider  Next visit with Family Practice is on: No future appointment listed in this department    Max refill for 12 months from last office visit or per provider note.    Chart review shows that patient was last seen by PCP on 17 for a physical as well as an annual medicare wellness visit. Rx as requested was noted and reviewed as per office visit notes on that date. No changes to rx as requested. However, patient would be due for annual office visit with PCP. Writer will refill rx as requested at this time for a limited supply, and will send patient a reminder letter.    Prescription refilled per RN Medication Refill Policy.................... Vlad Luevano RN ....................  2017   9:23 AM

## 2018-02-12 NOTE — NURSING NOTE
Patient Information     Patient Name MRN Sex Afia Cates [id number] Female 1950      Nursing Note by Estefanía Hastings at 2018 11:00 AM     Author:  Estefanía Hastings Service:  (none) Author Type:  (none)     Filed:  2018 11:19 AM Encounter Date:  2018 Status:  Signed     :  Estefanía Hastings            Patient is here for annual physical, states no concerns or problems at this time.  Estefanía Hastings LPN .............2018  10:58 AM

## 2018-02-13 NOTE — PROGRESS NOTES
Patient Information     Patient Name MRN Sex Afia Cates [id number] Female 1950      Progress Notes by Melissa Bueno R.T. (ARRT) at 2018  9:49 AM     Author:  Mel[facility] R.T. [facility] Service:  (none) Author Type:  [employer] - Registered Radiologic Technologist     Filed:  2018  9:49 AM Date of Service:  2018  9:49 AM Status:  Signed     :  Melissa Bueno R.T. (ARRT) ([employer] - Registered Radiologic Technologist)            Falls Risk Criteria:    Age 65 and older or under age 4        Sensory deficits    Poor vision    Use of ambulatory aides    Impaired judgment    Unable to walk independently    Meets High Risk criteria for falls:  Yes               1.  Do you have dizziness or vertigo?    no                    2.  Do you need help standing or walking?   no                 3.  Have you fallen within the last 6 months?    no           4.  Has the patient been fasting?      no       If any risks are marked Yes, the following interventions are utilized:    Do not leave patient unattended     Assist patient in the dressing room and bathroom    Have ambulatory aides available throughout procedure    Involve patient s family if available

## 2018-02-13 NOTE — PROGRESS NOTES
Patient Information     Patient Name MRN Sex Afia Cates [id number] Female 1950      Progress Notes by Joan Velazquez MD at 2018 11:00 AM     Author:  Joan Velazquez MD Service:  (none) Author Type:  Physician     Filed:  2018  5:16 PM Encounter Date:  2018 Status:  Signed     :  Joan Velazquez MD (Physician)            Medicare Wellness Visit  Date of visit: 2018   Afia Moise is a 67 y.o. female  Clinical Staff Summary  Patient Care Team:  Joan Velazquez MD as PCP - General   Quality Of Life     Mental Health: Low 4-7, Medium 8-12, High 13-20  Physical Health: Low 4-8, Medium 9-13, High 14-20       PHQ  Date of PHQ exam: 18  PHQ-2 TOTAL SCORE: 2  PHQ-9 TOTAL SCORE: 5  Depression Severity Level: mild    Mini-Cog      0, 1, or 2 possible impairment   3, 4, or 5 suggests no impairment      ______________________________________________________________________  Specific Concerns Identified by Clinical Staff     ______________________________________________________________________    Chief Complaint    Patient presents with      Physical     HPI: presents for annual review. Stressful year with caring for her aging parents, [place]  She is physically feeling well.      Review of Systems:  A comprehensive review of systems is negative except for items noted in HPI.     Problem List  Patient Active Problem List     Diagnosis  Code     BICIPITAL TENDINITIS M65.9     ROTATOR CUFF TENDONITIS M71.9, M67.919     ELBOW PAIN M25.529     HYPERTENSION I10     SLEEP DISORDER G47.9     OSTEOPENIA M89.9, M94.9     SKIN LESION L98.9     Mixed dyslipidemia E78.2     Sigmoid diverticulosis K57.30     Special screening for malignant neoplasms, colon Z12.11     MDD (major depressive disorder) F32.9       Current Medications:   Current Outpatient Prescriptions     Medication  Sig     aspirin (ECOTRIN) 81 mg  "enteric coated tablet Take 1 tablet by mouth once daily with a meal.     Calcium-Cholecalciferol, D3, (CALTRATE 600 + D) 600 mg(1,500mg) -400 unit Chew Take 1 tablet by mouth once daily.     cholecalciferol (VITAMIN D) 1,000 unit tablet Take 1,000 Units by mouth once daily.     citalopram (CELEXA) 40 mg tablet Take 1 tablet by mouth at bedtime.     fluticasone (50 mcg per actuation) nasal solution (FLONASE) Inhale 1 Spray into both nostrils once daily.     glucosamine-chondroit-vit c-mn (GLUCOSAMINE-CHONDROITIN COMPLX) 500-400 mg cap Take one tablet by mouth daily     metroNIDAZOLE (METROGEL) 0.75 % gel Apply  topically to affected area(s) 2 times daily.     omeprazole (PRILOSEC) 20 mg Delayed-Release capsule Take 1 capsule by mouth once daily before a meal.     simvastatin (ZOCOR) 10 mg tablet Take 1 tablet by mouth at bedtime.     zolpidem (AMBIEN) 10 mg tablet Take 1 tablet by mouth at bedtime if needed for Sleep.     No current facility-administered medications for this visit.      Medications have been reviewed by me and are current to the best of my knowledge and ability.       PHYSICAL EXAM:  GENERAL APPEARANCE: no acute distress    /78 (Cuff Site: Right Arm, Position: Sitting, Cuff Size: Adult Regular)  Pulse 84  Ht 1.594 m (5' 2.75\")  Wt 67.8 kg (149 lb 6.4 oz)  BMI 26.68 kg/m2  Body mass index is 26.68 kg/(m^2).  General Appearance: Pleasant, alert, appropriate appearance for age. No acute distress  Funduscopic Exam: Normal red reflex and fundoscopic exam.  Ear Exam: Normal TM's bilaterally. Normal auditory canals and external ears. Non-tender.  Nose Exam: Normal external nose, mucus membranes, and septum.  OroPharynx Exam: Dental hygiene adequate. Normal buccal mucosa. Normal pharynx.  Neck Exam: Supple, no masses or nodes.  Thyroid Exam: No nodules or enlargement.  Chest/Respiratory Exam: Normal chest wall and respirations. Clear to auscultation.  Breast Exam: No dimpling, nipple retraction or " discharge. No masses or nodes.  Cardiovascular Exam: Regular rate and rhythm. S1, S2, no murmur, click, gallop, or rubs.  Gastrointestinal Exam: Soft, nontender, no abnormal masses or organomegaly.  Skin: no rash or abnormalities  Neurologic Exam: Nonfocal; symmetric DTRs, normal gross motor movement, tone, and coordination. No tremor.  Psychiatric Exam: Alert and oriented, appropriate affect.    ASSESSMENT/PLAN:    ICD-10-CM    1. Medicare annual wellness visit, initial Z00.00    2. Recurrent major depressive disorder, remission status unspecified (HC) F33.9 citalopram (CELEXA) 40 mg tablet   3. Mixed dyslipidemia E78.2 COMPLETE METABOLIC PANEL      LIPID PANEL      COMPLETE METABOLIC PANEL      LIPID PANEL   4. Disturbance in sleep behavior G47.9 zolpidem (AMBIEN) 10 mg tablet   5. Acne rosacea L71.9 metroNIDAZOLE (METROGEL) 0.75 % gel   6. Vaccine for streptococcus pneumoniae and influenza Z23 OMNI PREVNAR 13 (AKA PNEUMOCOCCAL VACCINE 13-VALENT IM)      ID ADMIN VACC INITIAL   7. Fatigue, unspecified type R53.83 TSH      CBC WITH DIFFERENTIAL      TSH      CBC WITH DIFFERENTIAL      CBC WITH AUTO DIFFERENTIAL   8. OSTEOPENIA M89.9 XR DXA BONE DENSITY 2 SITES AXIAL     M94.9       No longer needs screening pap smears  scehdule annual mammogram  Repeat DEXA due to previous 9% decline, reinforced importance of calc/vit d  Refilled medications  Check above labs    Ms. Moise's Body mass index is 26.68 kg/(m^2). This is out of the normal range for a 67 y.o. Normal range for ages 18+ is between 18.5 and 24.9. To lose weight we reviewed risks and benefits of appropriate options such as diet, exercise, and medications. Patient's strategy will be  self-directed nutrition plan and self-directed exercise program     Advance Care Planning discussed: no   Advance Care Plan Documents     No Documents on File       There are no Patient Instructions on file for this visit.      5-10 year plan located in [facility] and  printed on AVS or sent via [employer].  Joan Raygoza MD  5:14 PM 1/16/2018

## 2018-03-23 ENCOUNTER — OFFICE VISIT (OUTPATIENT)
Dept: FAMILY MEDICINE | Facility: OTHER | Age: 68
End: 2018-03-23
Attending: FAMILY MEDICINE
Payer: COMMERCIAL

## 2018-03-23 VITALS
HEART RATE: 77 BPM | BODY MASS INDEX: 25.66 KG/M2 | HEIGHT: 63 IN | TEMPERATURE: 97.7 F | SYSTOLIC BLOOD PRESSURE: 129 MMHG | WEIGHT: 144.8 LBS | DIASTOLIC BLOOD PRESSURE: 88 MMHG

## 2018-03-23 DIAGNOSIS — F32.9 MDD (MAJOR DEPRESSIVE DISORDER): ICD-10-CM

## 2018-03-23 DIAGNOSIS — J01.90 ACUTE SINUSITIS WITH SYMPTOMS > 10 DAYS: Primary | ICD-10-CM

## 2018-03-23 DIAGNOSIS — E78.2 MIXED DYSLIPIDEMIA: ICD-10-CM

## 2018-03-23 DIAGNOSIS — F33.41 MAJOR DEPRESSIVE DISORDER, RECURRENT EPISODE, IN PARTIAL REMISSION (H): Primary | ICD-10-CM

## 2018-03-23 PROCEDURE — G0463 HOSPITAL OUTPT CLINIC VISIT: HCPCS

## 2018-03-23 PROCEDURE — 99213 OFFICE O/P EST LOW 20 MIN: CPT | Performed by: FAMILY MEDICINE

## 2018-03-23 RX ORDER — AMOXICILLIN 500 MG/1
500 CAPSULE ORAL 3 TIMES DAILY
Qty: 30 CAPSULE | Refills: 0 | Status: SHIPPED | OUTPATIENT
Start: 2018-03-23 | End: 2018-04-02

## 2018-03-23 RX ORDER — CITALOPRAM HYDROBROMIDE 40 MG/1
40 TABLET ORAL AT BEDTIME
Qty: 90 TABLET | Refills: 3 | Status: SHIPPED | OUTPATIENT
Start: 2018-03-23 | End: 2019-01-31

## 2018-03-23 RX ORDER — SIMVASTATIN 10 MG
10 TABLET ORAL AT BEDTIME
Qty: 90 TABLET | Refills: 3 | Status: SHIPPED | OUTPATIENT
Start: 2018-03-23 | End: 2019-01-31

## 2018-03-23 ASSESSMENT — ENCOUNTER SYMPTOMS
EYE PAIN: 0
FACIAL SWELLING: 1
EYE REDNESS: 0
FEVER: 0
EYE DISCHARGE: 0
RHINORRHEA: 1
SINUS PAIN: 1
CHILLS: 1
FATIGUE: 0
SORE THROAT: 0
SHORTNESS OF BREATH: 0
EYE ITCHING: 0
COUGH: 0
SINUS PRESSURE: 1

## 2018-03-23 ASSESSMENT — PAIN SCALES - GENERAL: PAINLEVEL: NO PAIN (0)

## 2018-03-23 NOTE — MR AVS SNAPSHOT
"              After Visit Summary   3/23/2018    Afia Moise    MRN: [id number]           Patient Information     Date Of Birth          1950        Visit Information        Provider Department      3/23/2018 1:00 PM Joan Mckeon MD [facility]        Today's Diagnoses     Acute sinusitis with symptoms > 10 days    -  1       Follow-ups after your visit        Who to contact     If you have questions or need follow up information about today's clinic visit or your schedule please contact [facility] directly at [phone].  Normal or non-critical lab and imaging results will be communicated to you by SnapMyAdhart, letter or phone within 4 business days after the clinic has received the results. If you do not hear from us within 7 days, please contact the clinic through [employer] or phone. If you have a critical or abnormal lab result, we will notify you by phone as soon as possible.  Submit refill requests through [employer] or call your pharmacy and they will forward the refill request to us. Please allow 3 business days for your refill to be completed.          Additional Information About Your Visit        MyChart Information     [employer] gives you secure access to your electronic health record. If you see a primary care provider, you can also send messages to your care team and make appointments. If you have questions, please call your primary care clinic.  If you do not have a primary care provider, please call [phone] and they will assist you.        Care EveryWhere ID     This is your Care EveryWhere ID. This could be used by other organizations to access your [place] medical records  [id number]        Your Vitals Were     Pulse Temperature Height BMI (Body Mass Index)          77 97.7  F (36.5  C) (Oral) 5' 2.75\" (1.594 m) 25.85 kg/m2         Blood Pressure from Last 3 Encounters:   03/23/18 129/88   01/16/18 102/78   01/06/17 118/84    " Weight from Last 3 Encounters:   03/23/18 144 lb 12.8 oz (65.7 kg)   01/16/18 149 lb 6.4 oz (67.8 kg)   01/06/17 156 lb 6.4 oz (70.9 kg)              Today, you had the following     No orders found for display         Today's Medication Changes          These changes are accurate as of 3/23/18  1:34 PM.  If you have any questions, ask your nurse or doctor.               Start taking these medicines.        Dose/Directions    amoxicillin 500 MG capsule   Commonly known as:  AMOXIL   Used for:  Acute sinusitis with symptoms > 10 days   Started by:  Joan Mckeon MD        Dose:  500 mg   Take 1 capsule (500 mg) by mouth 3 times daily   Quantity:  30 capsule   Refills:  0            Where to get your medicines      These medications were sent to [facility] [address] [facility] - [place], [place] - [address]  [address] [facility] [address]     Phone:  [phone]     amoxicillin 500 MG capsule                Primary Care Provider Office Phone # Fax #    Joan Raygoza [phone]       [address] [facility] [address]        Equal Access to Services     DAVE BE AH: Hadii hari prasad hadasho Soomaali, waaxda luqadaha, qaybta kaalmada adeegyada, darwin mendenhall. So [facility] [phone].    ATENCIÓN: Si habla español, tiene a goins disposición servicios gratuitos de asistencia lingüística. Llame al [phone].    We comply with applicable federal civil rights laws and [place] laws. We do not discriminate on the basis of race, color, national origin, age, disability, sex, sexual orientation, or gender identity.            Thank you!     Thank you for choosing [facility] AND [facility]  for your care. Our goal is always to provide you with excellent care. Hearing back from our patients is one way we can continue to improve our services. Please take a few minutes to complete the written survey that you may receive in the  mail after your visit with us. Thank you!             Your Updated Medication List - Protect others around you: Learn how to safely use, store and throw away your medicines at www.disposemymeds.org.          This list is accurate as of 3/23/18  1:34 PM.  Always use your most recent med list.                   Brand Name Dispense Instructions for use Diagnosis    amoxicillin 500 MG capsule    AMOXIL    30 capsule    Take 1 capsule (500 mg) by mouth 3 times daily    Acute sinusitis with symptoms > 10 days       aspirin EC 81 MG EC tablet      Take 81 mg by mouth daily with food        Calcium Carb-Cholecalciferol 600-800 MG-UNIT Chew      Take 1 tablet by mouth daily        citalopram 40 MG tablet    celeXA     Take 40 mg by mouth At Bedtime        fish oil-omega-3 fatty acids 1000 MG capsule      Take 1 capsule by mouth daily        fluticasone 50 MCG/ACT spray    FLONASE     Spray 1 spray into both nostrils daily        GLUCOSAMINE SULFATE PO      Take 1 tablet by mouth daily        metroNIDAZOLE 0.75 % topical gel    METROGEL          omeprazole 20 MG CR capsule    priLOSEC     Take 20 mg by mouth daily Before a meal        simvastatin 10 MG tablet    ZOCOR     Take 10 mg by mouth At Bedtime        triamcinolone 0.1 % cream    KENALOG          VITAMIN D-1000 MAX ST 1000 UNITS Tabs   Generic drug:  cholecalciferol      Take 1,000 Units by mouth daily        zolpidem 10 MG tablet    AMBIEN     Take 10 mg by mouth nightly as needed for sleep

## 2018-03-23 NOTE — NURSING NOTE
Patient is here for concerns of cold URI for a week, would like right hand checked thinks could have arthritis, and also would like skin on back checked.  Estefanía Hastings LPN .............3/23/67408:02 PM

## 2018-03-23 NOTE — PROGRESS NOTES
"  SUBJECTIVE:   Afia Moise is a 67 year old female who presents to clinic today for the following health issues:  Nursing Notes:   Estefanía Hastings LPN  3/23/2018  1:24 PM  Signed  Patient is here for concerns of cold URI for a week, would like right hand checked thinks could have arthritis, and also would like skin on back checked.  Estefanía Hastings LPN .............3/23/83639:02 PM    HPI      Patient Active Problem List    Diagnosis Date Noted     MDD (major depressive disorder) 01/31/2018     Priority: Medium     Special screening for malignant neoplasms, colon 04/24/2014     Priority: Medium     Mixed dyslipidemia 11/23/2012     Priority: Medium     Synovitis and tenosynovitis 04/16/2012     Priority: Medium     Elbow pain 04/16/2012     Priority: Medium     Disorder of bursae and tendons in shoulder region 04/16/2012     Priority: Medium     Skin lesion 10/27/2011     Priority: Medium     Disorder of bone and cartilage 11/09/2010     Priority: Medium     Hypertension 02/15/2010     Priority: Medium     Sleep disorder 08/01/2007     Priority: Medium     Sigmoid diverticulosis 02/24/2003     Priority: Medium     Past Surgical History:   Procedure Laterality Date     COLONOSCOPY      2003,diverticulosis     COLONOSCOPY      4/24/2014     OTHER SURGICAL HISTORY      [id number],PARTIAL HYSTERECTOMY,secondary to DUB. Ovaries in place. was on HRT, stopped patch in 7/2011       Review of Systems   Constitutional: Positive for chills. Negative for fatigue and fever.   HENT: Positive for congestion, ear pain, facial swelling, postnasal drip, rhinorrhea, sinus pain and sinus pressure. Negative for sore throat.    Eyes: Negative for pain, discharge, redness and itching.   Respiratory: Negative for cough and shortness of breath.         OBJECTIVE:     /88  Pulse 77  Temp 97.7  F (36.5  C) (Oral)  Ht 5' 2.75\" (1.594 m)  Wt 144 lb 12.8 oz (65.7 kg)  BMI 25.85 kg/m2  Body mass index is 25.85 kg/(m^2).  Physical " Exam   Constitutional: She appears well-developed.   HENT:   Right Ear: External ear normal.   Left Ear: External ear normal.   Nose: Nose normal.   Mouth/Throat: Oropharynx is clear and moist. No oropharyngeal exudate.   Eyes: EOM are normal.   Neck: No thyromegaly present.   Cardiovascular: Normal rate and regular rhythm.    No murmur heard.  Pulmonary/Chest: Effort normal and breath sounds normal.   Musculoskeletal: She exhibits no edema.           ASSESSMENT/PLAN:           ICD-10-CM    1. Acute sinusitis with symptoms > 10 days J01.90 amoxicillin (AMOXIL) 500 MG capsule     Given duration of symptoms, will start on amoxicillin 500 mg 3 times daily ×10 days.  Recommend she resume Flonase at bedtime and nasal saline lavage in the morning.  Discussed other supportive cares.  She will follow-up as needed.    Joan Velazquez MD  [facility]

## 2018-03-23 NOTE — TELEPHONE ENCOUNTER
Received fax from pharmacy for refills, teed up meds to send to PCP to address as patient was here today for office visit.  Tuyet Haynes RN on 3/23/2018 at 1:56 PM

## 2018-03-28 DIAGNOSIS — K21.9 GASTROESOPHAGEAL REFLUX DISEASE, ESOPHAGITIS PRESENCE NOT SPECIFIED: Primary | ICD-10-CM

## 2018-04-02 NOTE — TELEPHONE ENCOUNTER
[facility] pharmacy and pt request a Rx refill for omeprazole (Prilosec).  PPI protcol passed.  No diagnosis in health history that correlates with Rx.  Will route to PCP for review and consideration of refill.  Unable to complete prescription refill per RN Medication Refill Policy.................... Ivette Boogie ....................  4/2/2018   1:00 PM

## 2018-04-03 NOTE — TELEPHONE ENCOUNTER
Chart review shows that patient was last seen by PCP on 3/23/18. No changes to rx as requested noted in office visit notes on that date, and patient is to follow up as needed. Dx for rx was found in care everywhere by this writer. RN Refill protocol passed. Writer will refill rx as requested at this time for an annual supply.    Prescription refilled per RN Medication Refill Policy..................Vlad Luevano 4/3/2018 9:57 AM

## 2018-07-23 NOTE — PROGRESS NOTES
Patient Information     Patient Name  Afia Moise MRN  [id number] Sex  Female   1950      Letter by Joan Velazquez MD at      Author:  Joan Velazquez MD Service:  (none) Author Type:  (none)    Filed:   Encounter Date:  2017 Status:  (Other)           Afia Moise  [address] [address]  [place] [address]          2017    Dear Ms. Moise:    This is to remind you that you are due for your annual appointment with Joan Velazquez MD. Your last visit was on 17. Additional refills of your medication require you to complete this visit.    Please call [phone] to schedule your appointment.    Thank you for choosing [facility] for your health care needs.    Sincerely,      Refill RN  [facility]

## 2018-07-23 NOTE — PROGRESS NOTES
Patient Information     Patient Name  Afia Moise MRN  [id number] Sex  Female   1950      Letter by Joan Velazquez MD at      Author:  Joan Velazquez MD Service:  (none) Author Type:  (none)    Filed:   Encounter Date:  2018 Status:  (Other)           Afia Moise  [address]  [place] [address]          2018    Dear Ms. Moise:      Enclosed is a copy of recent lab results. All labs were within normal limits.    Please call with concerns.    Sincerely,  Joan Raygoza MD     Results for orders placed or performed in visit on 18       COMPLETE METABOLIC PANEL       Result  Value Ref Range Status    SODIUM 139 133 - 143 mmol/L Final    POTASSIUM 4.0 3.5 - 5.1 mmol/L Final    CHLORIDE 105 98 - 107 mmol/L Final    CO2,TOTAL 23 21 - 31 mmol/L Final    ANION GAP 11 5 - 18                 Final    GLUCOSE 102 70 - 105 mg/dL Final    CALCIUM 9.1 8.6 - 10.3 mg/dL Final    BUN 19 7 - 25 mg/dL Final    CREATININE 0.89 0.70 - 1.30 mg/dL Final    BUN/CREAT RATIO           21                 Final    GFR if African American >60 >60 ml/min/1.73m2 Final    GFR if not African American >60 >60 ml/min/1.73m2 Final    ALBUMIN 4.3 3.5 - 5.7 g/dL Final    PROTEIN,TOTAL 7.3 6.4 - 8.9 g/dL Final    GLOBULIN                  3.0 2.0 - 3.7 g/dL Final    A/G RATIO 1.4 1.0 - 2.0                 Final    BILIRUBIN,TOTAL 1.0 0.3 - 1.0 mg/dL Final    ALK PHOSPHATASE 67 34 - 104 IU/L Final    ALT (SGPT) 13 7 - 52 IU/L Final    AST (SGOT) 17 13 - 39 IU/L Final   LIPID PANEL       Result  Value Ref Range Status    CHOLESTEROL,TOTAL 169 <200 mg/dL Final    TRIGLYCERIDES 107 <150 mg/dL Final    HDL CHOLESTEROL 52 23 - 92 mg/dL Final    NON-HDL CHOLESTEROL 117 <145 mg/dl Final    CHOL/HDL RATIO            3.25 <4.50                 Final    LDL CHOLESTEROL 96 <100 mg/dL Final    PROVIDER ORDERED STATUS FASTING  Final   TSH       Result  Value Ref Range Status    TSH 1.10  0.34 - 5.60 uIU/mL Final   CBC WITH AUTO DIFFERENTIAL       Result  Value Ref Range Status    WHITE BLOOD COUNT         5.1 4.5 - 11.0 thou/cu mm Final    RED BLOOD COUNT           4.98 4.00 - 5.20 mil/cu mm Final    HEMOGLOBIN                15.4 12.0 - 16.0 g/dL Final    HEMATOCRIT                44.4 33.0 - 51.0 % Final    MCV                       89 80 - 100 fL Final    MCH                       30.9 26.0 - 34.0 pg Final    MCHC                      34.7 32.0 - 36.0 g/dL Final    RDW                       12.2 11.5 - 15.5 % Final    PLATELET COUNT            188 140 - 440 thou/cu mm Final    MPV                       10.0 6.5 - 11.0 fL Final    NEUTROPHILS               60.5 42.0 - 72.0 % Final    LYMPHOCYTES               26.9 20.0 - 44.0 % Final    MONOCYTES                 9.2 <12.0 % Final    EOSINOPHILS               2.0 <8.0 % Final    BASOPHILS                 1.2 <3.0 % Final    IMMATURE GRANULOCYTES(METAS,MYELOS,PROS) 0.2 % Final    ABSOLUTE NEUTROPHILS      3.1 1.7 - 7.0 thou/cu mm Final    ABSOLUTE LYMPHOCYTES      1.4 0.9 - 2.9 thou/cu mm Final    ABSOLUTE MONOCYTES        0.5 <0.9 thou/cu mm Final    ABSOLUTE EOSINOPHILS      0.1 <0.5 thou/cu mm Final    ABSOLUTE BASOPHILS        0.1 <0.3 thou/cu mm Final    ABSOLUTE IMMATURE GRANULOCYTES(METAS,MYELOS,PROS) 0.0 <=0.3 thou/cu mm Final

## 2018-10-09 DIAGNOSIS — G47.9 DISTURBANCE IN SLEEP BEHAVIOR: ICD-10-CM

## 2018-10-09 DIAGNOSIS — H69.90 EUSTACHIAN TUBE DISORDER, UNSPECIFIED LATERALITY: Primary | ICD-10-CM

## 2018-10-12 RX ORDER — ZOLPIDEM TARTRATE 10 MG/1
TABLET ORAL
Qty: 90 TABLET | Refills: 1 | Status: SHIPPED | OUTPATIENT
Start: 2018-10-12 | End: 2019-01-31

## 2018-10-12 RX ORDER — FLUTICASONE PROPIONATE 50 MCG
SPRAY, SUSPENSION (ML) NASAL
Qty: 1 BOTTLE | Refills: 3 | Status: SHIPPED | OUTPATIENT
Start: 2018-10-12 | End: 2020-04-24

## 2018-10-12 NOTE — TELEPHONE ENCOUNTER
LOV with PCP was on 3/23/18. Use of flonase as requested was noted in office visit notes on that date with no changes and patient was to follow up on an as needed basis. Writer will refill that Rx as requested.    Prescription refilled per RN Medication Refill Policy..................Vlad Luevano 10/12/2018 9:40 AM    Chart review of Care everywhere summary shows that Rx for ambien was last filled as noted below:    zolpidem (AMBIEN) 10 mg tablet    Indications: Disturbance in sleep behavior Take 1 tablet by mouth at bedtime if needed for Sleep. 90 tablet   1 01/16/2018   Active     Ambien is not on RN refill protocol. Writer will stephany up and route Rx request for ambien to PCP for her consideration/approval.    Unable to complete prescription refill per RN Medication Refill Policy. Vlad Luevano 10/12/2018 9:42 AM

## 2019-01-31 ENCOUNTER — OFFICE VISIT (OUTPATIENT)
Dept: FAMILY MEDICINE | Facility: OTHER | Age: 69
End: 2019-01-31
Attending: FAMILY MEDICINE
Payer: COMMERCIAL

## 2019-01-31 VITALS
BODY MASS INDEX: 26.19 KG/M2 | RESPIRATION RATE: 18 BRPM | WEIGHT: 147.8 LBS | DIASTOLIC BLOOD PRESSURE: 71 MMHG | HEART RATE: 82 BPM | TEMPERATURE: 97.8 F | SYSTOLIC BLOOD PRESSURE: 109 MMHG | HEIGHT: 63 IN

## 2019-01-31 DIAGNOSIS — G47.9 DISTURBANCE IN SLEEP BEHAVIOR: ICD-10-CM

## 2019-01-31 DIAGNOSIS — F33.41 MAJOR DEPRESSIVE DISORDER, RECURRENT EPISODE, IN PARTIAL REMISSION (H): ICD-10-CM

## 2019-01-31 DIAGNOSIS — L98.9 SKIN LESION: Primary | ICD-10-CM

## 2019-01-31 DIAGNOSIS — Z00.00 MEDICARE ANNUAL WELLNESS VISIT, SUBSEQUENT: ICD-10-CM

## 2019-01-31 DIAGNOSIS — E78.2 MIXED DYSLIPIDEMIA: ICD-10-CM

## 2019-01-31 DIAGNOSIS — L57.0 AK (ACTINIC KERATOSIS): ICD-10-CM

## 2019-01-31 PROCEDURE — 88305 TISSUE EXAM BY PATHOLOGIST: CPT | Performed by: FAMILY MEDICINE

## 2019-01-31 PROCEDURE — G0463 HOSPITAL OUTPT CLINIC VISIT: HCPCS | Mod: 25

## 2019-01-31 PROCEDURE — 11104 PUNCH BX SKIN SINGLE LESION: CPT | Performed by: FAMILY MEDICINE

## 2019-01-31 PROCEDURE — 17000 DESTRUCT PREMALG LESION: CPT | Performed by: FAMILY MEDICINE

## 2019-01-31 PROCEDURE — G0439 PPPS, SUBSEQ VISIT: HCPCS | Performed by: FAMILY MEDICINE

## 2019-01-31 PROCEDURE — 99213 OFFICE O/P EST LOW 20 MIN: CPT | Mod: 25 | Performed by: FAMILY MEDICINE

## 2019-01-31 PROCEDURE — G0463 HOSPITAL OUTPT CLINIC VISIT: HCPCS

## 2019-01-31 RX ORDER — CITALOPRAM HYDROBROMIDE 40 MG/1
40 TABLET ORAL AT BEDTIME
Qty: 90 TABLET | Refills: 3 | Status: SHIPPED | OUTPATIENT
Start: 2019-01-31 | End: 2020-01-28

## 2019-01-31 RX ORDER — SIMVASTATIN 10 MG
10 TABLET ORAL AT BEDTIME
Qty: 90 TABLET | Refills: 3 | Status: SHIPPED | OUTPATIENT
Start: 2019-01-31 | End: 2020-03-04

## 2019-01-31 RX ORDER — ZOLPIDEM TARTRATE 10 MG/1
TABLET ORAL
Qty: 90 TABLET | Refills: 1 | Status: SHIPPED | OUTPATIENT
Start: 2019-01-31 | End: 2019-07-02

## 2019-01-31 ASSESSMENT — MIFFLIN-ST. JEOR: SCORE: 1166.61

## 2019-01-31 ASSESSMENT — ANXIETY QUESTIONNAIRES
5. BEING SO RESTLESS THAT IT IS HARD TO SIT STILL: SEVERAL DAYS
IF YOU CHECKED OFF ANY PROBLEMS ON THIS QUESTIONNAIRE, HOW DIFFICULT HAVE THESE PROBLEMS MADE IT FOR YOU TO DO YOUR WORK, TAKE CARE OF THINGS AT HOME, OR GET ALONG WITH OTHER PEOPLE: NOT DIFFICULT AT ALL
GAD7 TOTAL SCORE: 7
7. FEELING AFRAID AS IF SOMETHING AWFUL MIGHT HAPPEN: SEVERAL DAYS
3. WORRYING TOO MUCH ABOUT DIFFERENT THINGS: SEVERAL DAYS
2. NOT BEING ABLE TO STOP OR CONTROL WORRYING: SEVERAL DAYS
6. BECOMING EASILY ANNOYED OR IRRITABLE: SEVERAL DAYS
1. FEELING NERVOUS, ANXIOUS, OR ON EDGE: SEVERAL DAYS

## 2019-01-31 ASSESSMENT — PATIENT HEALTH QUESTIONNAIRE - PHQ9
SUM OF ALL RESPONSES TO PHQ QUESTIONS 1-9: 4
5. POOR APPETITE OR OVEREATING: SEVERAL DAYS

## 2019-01-31 ASSESSMENT — PAIN SCALES - GENERAL: PAINLEVEL: NO PAIN (0)

## 2019-01-31 NOTE — NURSING NOTE
Patient is here for annual physical, medicare wellness, and medication renewal.  Estefanía Hastings LPN .............1/31/2019     2:09 PM        No LMP recorded. Patient has had a hysterectomy.  Medication Reconciliation: complete    Estefanía Hastings LPN  1/31/2019 2:18 PM

## 2019-01-31 NOTE — PROGRESS NOTES
Medicare Annual Wellness Visit         HPI     This 68 year old female presents as an established patient  Joan Mckeon who presents for an subsequent Medicare Wellness Exam.  Patient also reports changing lesion on her back, itchy seem to be getting bigger, history of BCC on face    Taking care of elderly parents         Patient Active Problem List   Diagnosis     Synovitis and tenosynovitis     Elbow pain     Hypertension     MDD (major depressive disorder)     Mixed dyslipidemia     Disorder of bone and cartilage     Disorder of bursae and tendons in shoulder region     Sigmoid diverticulosis     Skin lesion     Sleep disorder     Special screening for malignant neoplasms, colon       Past Medical History:   Diagnosis Date     Basal cell carcinoma of skin of nose     No Comments Provided     Hyperlipidemia     lowered zocor 2015     Other specified disorders of bone density and structure, unspecified site (CODE)     11/2014,DEXA,  t score -1.7, ca/vit d     Recurrent major depressive disorder (H)     No Comments Provided     Sleep disorder     No Comments Provided        Family History   Problem Relation Age of Onset     Diabetes Father         Diabetes     Hypertension Father         Hypertension     Heart Disease Father         Heart Disease     Osteoporosis Mother         Osteoporosis,compression fracture     Heart Disease Mother         Heart Disease     Other - See Comments Other         negative breast cancer     Breast Cancer No family hx of         Cancer-breast         Past Surgical History:   Procedure Laterality Date     COLONOSCOPY      2003,diverticulosis     COLONOSCOPY  2014    normal, repeat in 2024     OTHER SURGICAL HISTORY      IPS073,PARTIAL HYSTERECTOMY,secondary to DUB. Ovaries in place. was on HRT, stopped patch in 7/2011       Reviewed no other significant FH    Family History and past Medical History reviewed and it is unchanged/updated.       Review of Systems        Constitutional:   fevers, night sweats or unintentional weight change ?  NO      Eyes:   vision change, diplopia or red eyes?  NO      Ears, Nose, Mouth, Throat:   tinnitus or hearing change,  epistaxis or nasal discharge,  oral lesions, throat pain ?  NO      Neck:   stiffness?  NO           Cardiovascular:   chest pain, palpitations, or pain with walking, orthopnea or PND?  NO   Breasts:  Any bumps or unusual discharge?     NO         Respiratory:   dyspnea, cough, shortness of breath or wheezing?  NO         GI:   nausea, vomiting, diarrhea or constipation,  abdominal pain ?  NO         :   change in urine,  dysuria or hematuria,  sexual dysfunction ?  NO        Musculoskeletal:   joint or muscle pain or swelling?  NO            Skin:   concerning lesions or moles?  NO           Nervous System:   loss of strength or sensation,  numbness or tingling,  tremor,  dizziness,  headache?  NO   Endocrine/Homone:   polyuria or polydipsia,  temperature intolerance?  NO            Blood and Lymphnodes:   concerning bumps,  bleeding problems?  NO            Allergy:   environmental allergies?  NO            Mental Health:   depression or anxiety,  sleep problems?  NO               Medical Care     Have you been to an ER or a hospital in the last year? No  What other specialists or organizations are involved in your medical care?  none  Current providers sharing in care for this patient include:  Patient Care Team:  Joan Mckeon MD as PCP - General (Family Practice)  Joan Mckeon MD as PCP - Assigned PCP         Social History     Social History     Tobacco Use     Smoking status: Never Smoker     Smokeless tobacco: Never Used   Substance Use Topics     Alcohol use: No     Marital Status:  Who lives in your household?   Does your home have any of the following safety concerns? Loose rugs in the hallway, no grab bars in the bathroom, no handrails on the stairs or have  poorly lit areas?  No  Do you feel threatened or controlled by a partner, ex-partner or anyone in your life? No  Has anyone hurt you physically, for example by pushing, hitting, slapping or kicking you   or forcing you to have sex? No  Do you need help with the phone, transportation, shopping, preparing meals, housework, laundry, medications or managing money? No   Have you noticed any hearing difficulties? No      Risk Behaviors and Healthy Habits     How many servings of fruits and vegetables do you eat a day? 4  How often do you exercise and what do you do? 30   Do you frequently ride without a seatbelt? No  Do you use tobacco?  No  Do you use any other drugs? No         Do you use alcohol?Yes    Today's PHQ-2 Score: 2    Sexual Health     Are you sexually active?  Yes    If yes, with men, women, or both? Male      FOR WOMEN  What year did you stop having periods?   Any vaginal bleeding in the last year?   Have you ever had an abnormal Pap smear?     FUNCTIONAL ABILITY/SAFETY SCREENING     Fall Risk Assessment Today: Fallen 2 or more times in the past year?: No  Any fall with injury in the past year?: No    Hearing evaluation if done: No    EVALUATION OF COGNITIVE FUNCTION     Mood/affect:Normal  Appearance:Normal  Family member/caregiver input: Normal    Mini Cog Scoring   3 points   Clock Draw Test result:  Normal      SCREENING FOR PREVENTION and EARLY DETECTION     ECG (if done)not performed      CV Risk based on Pooled Cohort Risk:  The 10-year ASCVD risk score (Cherelle GONZALES Jr., et al., 2013) is: 4.8%    Values used to calculate the score:      Age: 68 years      Sex: Female      Is Non- : No      Diabetic: No      Tobacco smoker: No      Systolic Blood Pressure: 109 mmHg      Is BP treated: No      HDL Cholesterol: 52 mg/dL      Total Cholesterol: 169 mg/dL      Advanced Directives: Discussed and patient desires to be full code.      Immunization History   Administered Date(s)  "Administered     Flu 65+ Years 10/26/2018     Flu, Unspecified 10/14/2014     Influenza (H1N1) 01/07/2010     Influenza (High Dose) 3 valent vaccine 11/03/2017     Influenza (IIV3) PF 10/01/2007, 09/08/2008, 10/15/2009, 10/14/2010, 10/27/2011, 11/23/2012, 12/05/2013     Influenza, Whole Virus 12/13/2015     Pneumo Conj 13-V (2010&after) 01/16/2018     Pneumococcal 23 valent 01/06/2017     TD (ADULT, 7+) 07/15/2004     TDAP Vaccine (Adacel) 11/23/2012     Tetanus 07/15/2004     Twinrix A/B 04/10/2007, 05/21/2007, 10/01/2007     Zoster vaccine, live 12/05/2013       Reviewed Immunization Record Today  Pneumoccocal Vaccine: Yes  Varicella Vaccine: Yes  TDaP:Yes         Physical Exam     Vitals: /71 (BP Location: Right arm, Patient Position: Sitting, Cuff Size: Adult Regular)   Pulse 82   Temp 97.8  F (36.6  C) (Oral)   Resp 18   Ht 1.588 m (5' 2.5\")   Wt 67 kg (147 lb 12.8 oz)   Breastfeeding? No   BMI 26.60 kg/m    BMI= Body mass index is 26.6 kg/m .  GENERAL APPEARANCE: healthy, alert and no distress  EYES: Eyes grossly normal to inspection, PERRL and conjunctivae and sclerae normal  HENT: ear canals and TM's normal, nose and mouth without ulcers or lesions, oropharynx clear and oral mucous membranes moist  NECK: no adenopathy, no asymmetry, masses, or scars and thyroid normal to palpation  RESP: lungs clear to auscultation - no rales, rhonchi or wheezes  BREAST: normal without masses, tenderness or nipple discharge and no palpable axillary masses or adenopathy  CV: regular rate and rhythm, normal S1 S2, no S3 or S4, no murmur, click or rub, no peripheral edema and peripheral pulses strong  ABDOMEN: soft, nontender, no hepatosplenomegaly, no masses and bowel sounds normal  MS: no musculoskeletal defects are noted and gait is age appropriate without ataxia  SKIN: 4 AKs (2 on face and 2 on arms) treated with liquid nitrogen  2 cm x 1 cm flat scaly lesion on mid back without pigment, area is cleaned with " ETOH injected with 0.5cc of lidocaine without epi, 4 mm punch biopsy obtained, hemostasis achieved with pressure and alum chloride. Sent to path  NEURO: Normal strength and tone, sensory exam grossly normal, mentation intact and speech normal  PSYCH: mentation appears normal and affect normal/bright        Assessment and Plan   subsequent   Medicare Wellness Exam  1. Skin lesion    2. Major depressive disorder, recurrent episode, in partial remission (H)    3. Mixed dyslipidemia    4. Disturbance in sleep behavior    5. Medicare annual wellness visit, subsequent        Afia was seen today for recheck medication, physical and medicare visit.    Diagnoses and all orders for this visit:    Skin lesion  -     Surgical pathology exam    Major depressive disorder, recurrent episode, in partial remission (H)  -     citalopram (CELEXA) 40 MG tablet; Take 1 tablet (40 mg) by mouth At Bedtime    Mixed dyslipidemia  -     simvastatin (ZOCOR) 10 MG tablet; Take 1 tablet (10 mg) by mouth At Bedtime  -     Lipid Profile; Future  -     Comprehensive metabolic panel; Future    Disturbance in sleep behavior  -     zolpidem (AMBIEN) 10 MG tablet; Take 1 tablet by mouth at bedtime if needed for Sleep    Medicare annual wellness visit, subsequent      Plan of cares are current.  Patient would like to have a mammogram every other year.  Colonoscopy is not due till 2024.  Patient is due for fasting lipid panel and CMP and will return for those labs in the near future.  Refilled her medications.    Will contact her with results from pathology.  May need a large excision on her back and would recommend referral to surgery      Options for treatment and follow-up care were reviewed with the Afia JULIO Marcumd and/or guardian engaged in the decision making process and verbalized understanding of the options discussed and agreed with the final plan.    HUE ORELLANA

## 2019-02-01 DIAGNOSIS — E78.2 MIXED DYSLIPIDEMIA: ICD-10-CM

## 2019-02-01 LAB
ALBUMIN SERPL-MCNC: 4.4 G/DL (ref 3.5–5.7)
ALP SERPL-CCNC: 62 U/L (ref 34–104)
ALT SERPL W P-5'-P-CCNC: 13 U/L (ref 7–52)
ANION GAP SERPL CALCULATED.3IONS-SCNC: 5 MMOL/L (ref 3–14)
AST SERPL W P-5'-P-CCNC: 18 U/L (ref 13–39)
BILIRUB SERPL-MCNC: 0.9 MG/DL (ref 0.3–1)
BUN SERPL-MCNC: 13 MG/DL (ref 7–25)
CALCIUM SERPL-MCNC: 9.5 MG/DL (ref 8.6–10.3)
CHLORIDE SERPL-SCNC: 105 MMOL/L (ref 98–107)
CHOLEST SERPL-MCNC: 183 MG/DL
CO2 SERPL-SCNC: 29 MMOL/L (ref 21–31)
CREAT SERPL-MCNC: 0.93 MG/DL (ref 0.6–1.2)
GFR SERPL CREATININE-BSD FRML MDRD: 60 ML/MIN/{1.73_M2}
GLUCOSE SERPL-MCNC: 90 MG/DL (ref 70–105)
HDLC SERPL-MCNC: 57 MG/DL (ref 23–92)
LDLC SERPL CALC-MCNC: 97 MG/DL
NONHDLC SERPL-MCNC: 126 MG/DL
POTASSIUM SERPL-SCNC: 4.1 MMOL/L (ref 3.5–5.1)
PROT SERPL-MCNC: 7.1 G/DL (ref 6.4–8.9)
SODIUM SERPL-SCNC: 139 MMOL/L (ref 134–144)
TRIGL SERPL-MCNC: 147 MG/DL

## 2019-02-01 PROCEDURE — 36415 COLL VENOUS BLD VENIPUNCTURE: CPT | Performed by: FAMILY MEDICINE

## 2019-02-01 PROCEDURE — 80053 COMPREHEN METABOLIC PANEL: CPT | Performed by: FAMILY MEDICINE

## 2019-02-01 PROCEDURE — 80061 LIPID PANEL: CPT | Performed by: FAMILY MEDICINE

## 2019-02-01 ASSESSMENT — ANXIETY QUESTIONNAIRES: GAD7 TOTAL SCORE: 7

## 2019-02-01 NOTE — PROGRESS NOTES
Patient presents to clinic today for fasting lab only.  Jaquelin Tee CMA([facility])..................2/1/2019   8:48 AM

## 2019-02-12 ENCOUNTER — OFFICE VISIT (OUTPATIENT)
Dept: SURGERY | Facility: OTHER | Age: 69
End: 2019-02-12
Attending: SURGERY
Payer: MEDICARE

## 2019-02-12 VITALS
RESPIRATION RATE: 16 BRPM | HEART RATE: 78 BPM | TEMPERATURE: 97.1 F | WEIGHT: 152.2 LBS | DIASTOLIC BLOOD PRESSURE: 80 MMHG | SYSTOLIC BLOOD PRESSURE: 112 MMHG | BODY MASS INDEX: 27.39 KG/M2

## 2019-02-12 DIAGNOSIS — L98.9 SKIN LESION: Primary | ICD-10-CM

## 2019-02-12 PROCEDURE — 88305 TISSUE EXAM BY PATHOLOGIST: CPT

## 2019-02-12 PROCEDURE — G0463 HOSPITAL OUTPT CLINIC VISIT: HCPCS | Mod: 25

## 2019-02-12 PROCEDURE — 11603 EXC TR-EXT MAL+MARG 2.1-3 CM: CPT | Performed by: SURGERY

## 2019-02-12 ASSESSMENT — PAIN SCALES - GENERAL: PAINLEVEL: NO PAIN (0)

## 2019-02-12 NOTE — PATIENT INSTRUCTIONS
Your incision was closed with stitches that will dissolve.      The stitches will take about 6 weeks to fully dissolve.      The steri-strips will stay on for a week or 10 days.      It is ok to get the incision wet in the shower on the day after your procedure.     Don't soak in a tub, pool or lake for 5 days.

## 2019-02-12 NOTE — NURSING NOTE
"Chief Complaint   Patient presents with     Lesion Removal     basal cell on back     Patient presents to clinic to have a re-excision of a basal cell lesion on her back.  She had a biopsy done by Dr. Velazquez.    Initial /80 (BP Location: Right arm, Patient Position: Sitting, Cuff Size: Adult Regular)   Pulse 78   Temp 97.1  F (36.2  C) (Temporal)   Resp 16   Wt 69 kg (152 lb 3.2 oz)   BMI 27.39 kg/m   Estimated body mass index is 27.39 kg/m  as calculated from the following:    Height as of 1/31/19: 1.588 m (5' 2.5\").    Weight as of this encounter: 69 kg (152 lb 3.2 oz).  Medication Reconciliation: complete    TIMEOUT  Universal Protocol    A. Pre-procedure verification complete: yes   1-relevant information / documentation available, reviewed and properly matched to the patient; 2-consent accurate and complete, 3-equipment and supplies available    B. Site marking complete: N/A  Site marked if not in continuous attendance with patient    C. TIME OUT completed:  Yes  Time Out was conducted just prior to starting procedure to verify the eight required elements: 1-patient identity, 2-consent accurate and complete, 3-position, 4-correct side/site marked (if applicable), 5-procedure, 6-relevant images / results properly labeled and displayed (if applicable), 7-antibiotics / irrigation fluids (if applicable), 8-safety precautions.    Rika Alexander LPN  "

## 2019-02-12 NOTE — PROGRESS NOTES
Procedure Note     Pre/Post Operative Diagnosis:   Right mid-back lesion     Procedure:    Excision of Right mid-back lesion      Surgeon: HASEEB Maciel MD     Local Anesthesia: 1% lidocaine with0.25%Marcaine with epinephrine    Indication for the procedure:    This is a 68 year old female patient with known basal cell carcinoma from previous punch biopsy.   After explaining the risks to include bleeding, infection, recurrence or need for re-excision, and scarring the patient wished to proceed.    Procedure:   The area was prepped and draped in usual sterile fashion with ChloraPrep. After adequate local anesthesia, an elliptical skin incision was made to encompass the lesion, 3.0cm x 1.8 cm with margins. The subcutaneous tissues were reapproximated with 3-0 vicryl in inverted interrupted fashion. The skin was closed with 4-0 monocryl suture in a running fashion.  mastasol and steri-strips were applied.     Plan:  The patient will be called with pathology results.  Patient will followup if there any problems with the wound including redness or drainage.      HASEEB Maciel MD

## 2019-02-25 ENCOUNTER — OFFICE VISIT (OUTPATIENT)
Dept: SURGERY | Facility: OTHER | Age: 69
End: 2019-02-25
Attending: SURGERY
Payer: MEDICARE

## 2019-02-25 VITALS
HEART RATE: 76 BPM | HEIGHT: 63 IN | WEIGHT: 154 LBS | TEMPERATURE: 97 F | BODY MASS INDEX: 27.29 KG/M2 | RESPIRATION RATE: 18 BRPM | DIASTOLIC BLOOD PRESSURE: 100 MMHG | SYSTOLIC BLOOD PRESSURE: 138 MMHG

## 2019-02-25 DIAGNOSIS — C44.519 BASAL CELL CARCINOMA (BCC) OF SKIN OF OTHER PART OF TORSO: Primary | ICD-10-CM

## 2019-02-25 PROCEDURE — 88305 TISSUE EXAM BY PATHOLOGIST: CPT

## 2019-02-25 PROCEDURE — 12032 INTMD RPR S/A/T/EXT 2.6-7.5: CPT

## 2019-02-25 PROCEDURE — G0463 HOSPITAL OUTPT CLINIC VISIT: HCPCS

## 2019-02-25 PROCEDURE — 12032 INTMD RPR S/A/T/EXT 2.6-7.5: CPT | Mod: 51 | Performed by: SURGERY

## 2019-02-25 PROCEDURE — 11604 EXC TR-EXT MAL+MARG 3.1-4 CM: CPT | Performed by: SURGERY

## 2019-02-25 ASSESSMENT — MIFFLIN-ST. JEOR: SCORE: 1194.73

## 2019-02-25 ASSESSMENT — PAIN SCALES - GENERAL: PAINLEVEL: NO PAIN (0)

## 2019-02-25 NOTE — NURSING NOTE
Chief Complaint   Patient presents with     Procedure     re-excision     TIMEOUT  [place] Protocol    A. Pre-procedure verification complete: yes   1-relevant information / documentation available, reviewed and properly matched to the patient; 2-consent accurate and complete, 3-equipment and supplies available    B. Site marking complete: N/A  Site marked if not in continuous attendance with patient    C. TIME OUT completed:  Yes  Time Out was conducted just prior to starting procedure to verify the eight required elements: 1-patient identity, 2-consent accurate and complete, 3-position, 4-correct side/site marked (if applicable), 5-procedure, 6-relevant images / results properly labeled and displayed (if applicable), 7-antibiotics / irrigation fluids (if applicable), 8-safety precautions.      Rika Alexander LPN

## 2019-02-25 NOTE — PROGRESS NOTES
Procedure Note     Pre/Post Operative Diagnosis:   Left mid-back lesion     Procedure:    Excision of left mid-back lesion      Surgeon: HASEEB Maciel MD     Local Anesthesia: 1% lidocaine with0.25%Marcaine with epinephrine    Indication for the procedure:    This is a 68 year old female patient with known basal cell carcinoma from previous excisional biopsy with positive margin.   After explaining the risks to include bleeding, infection, recurrence or need for re-excision, and scarring the patient wished to proceed.    Procedure:   The area was prepped and draped in usual sterile fashion with ChloraPrep. After adequate local anesthesia, an elliptical skin incision was made to encompass the lesion, 4.0cm x 2.2 cm with margins. The subcutaneous tissues were reapproximated with 3-0 vicryl in inverted interrupted fashion. The skin was closed with 4-0 monocryl suture in a running fashion.  mastasol and steri-strips were applied.     Plan:  The patient will be called with pathology results.  Patient will followup if there any problems with the wound including redness or drainage.      HASEEB Maciel MD

## 2019-02-25 NOTE — PATIENT INSTRUCTIONS
Your incision was closed with stitches that will dissolve      It is ok to remove the dressing and get the incision wet in the shower on the day after your procedure.     Don't soak in a tub, pool or lake for 5 days. The small butterfly strips will start to peel off in 5-7 days it is ok to remove them. If you have concerns, please call.   .

## 2019-02-25 NOTE — NURSING NOTE
"Chief Complaint   Patient presents with     Procedure     re-excision         Initial BP (!) 138/100   Pulse 76   Temp 97  F (36.1  C) (Temporal)   Resp 18   Ht 1.588 m (5' 2.5\")   Wt 69.9 kg (154 lb)   BMI 27.72 kg/m   Estimated body mass index is 27.72 kg/m  as calculated from the following:    Height as of this encounter: 1.588 m (5' 2.5\").    Weight as of this encounter: 69.9 kg (154 lb).    Medication Reconciliation: complete      Norma J. Gosselin, LPN  "

## 2019-03-15 DIAGNOSIS — K21.9 GASTROESOPHAGEAL REFLUX DISEASE, ESOPHAGITIS PRESENCE NOT SPECIFIED: ICD-10-CM

## 2019-03-15 NOTE — TELEPHONE ENCOUNTER
"Refill request received from [facility] for: Omeprazole (PRILOSEC) 20 MG CR capsule; take 1 cap by PO before meal.     Last Written Prescription Date:  04/03/18  Last Fill Quantity: 90,   # refills: 3  Last Office Visit: 01/31/19 with PCP  Future Office Visit: None on file during initial refill review.       Requested Prescriptions   Pending Prescriptions Disp Refills     omeprazole (PRILOSEC) 20 MG DR capsule  90 capsule 3     Sig: TAKE 1 CAPSULE (20 MG) BY MOUTH DAILY BEFORE A MEAL    PPI Protocol Passed - 3/15/2019  4:23 PM       Passed - Not on Clopidogrel (unless Pantoprazole ordered)       Passed - No diagnosis of osteoporosis on record       Passed - Recent (12 mo) or future (30 days) visit within the authorizing provider's specialty    Patient had office visit in the last 12 months or has a visit in the next 30 days with authorizing provider or within the authorizing provider's specialty.  See \"Patient Info\" tab in inbasket, or \"Choose Columns\" in Meds & Orders section of the refill encounter.         Passed - Medication is active on med list       Passed - Patient is age 18 or older       Passed - No active pregnacy on record       Passed - No positive pregnancy test in past 12 months          Routing refill request to provider for review/approval because:  Clarifying refill request with PCP due to LOV 01/31/19 PCP stated renewed RX; however, this RX was not refilled at that time.       Ashley Love RN on 3/15/2019 at 4:42 PM    "

## 2019-07-02 ENCOUNTER — MYC REFILL (OUTPATIENT)
Dept: FAMILY MEDICINE | Facility: OTHER | Age: 69
End: 2019-07-02

## 2019-07-02 DIAGNOSIS — G47.9 DISTURBANCE IN SLEEP BEHAVIOR: ICD-10-CM

## 2019-07-02 RX ORDER — ZOLPIDEM TARTRATE 10 MG/1
TABLET ORAL
Qty: 90 TABLET | Refills: 1 | Status: SHIPPED | OUTPATIENT
Start: 2019-07-02 | End: 2020-01-14

## 2019-07-02 NOTE — TELEPHONE ENCOUNTER
Last script 1/31/19 at office visit with BLANCA.  Estefanía Hastings LPN .............7/2/2019     2:30 PM

## 2019-09-25 ENCOUNTER — MYC MEDICAL ADVICE (OUTPATIENT)
Dept: FAMILY MEDICINE | Facility: OTHER | Age: 69
End: 2019-09-25

## 2019-09-25 DIAGNOSIS — K21.9 GASTROESOPHAGEAL REFLUX DISEASE, ESOPHAGITIS PRESENCE NOT SPECIFIED: ICD-10-CM

## 2019-10-24 ENCOUNTER — ALLIED HEALTH/NURSE VISIT (OUTPATIENT)
Dept: FAMILY MEDICINE | Facility: OTHER | Age: 69
End: 2019-10-24
Attending: FAMILY MEDICINE
Payer: MEDICARE

## 2019-10-24 ENCOUNTER — HOSPITAL ENCOUNTER (OUTPATIENT)
Dept: MAMMOGRAPHY | Facility: OTHER | Age: 69
Discharge: HOME | End: 2019-10-24
Attending: FAMILY MEDICINE | Admitting: FAMILY MEDICINE
Payer: MEDICARE

## 2019-10-24 DIAGNOSIS — Z12.31 VISIT FOR SCREENING MAMMOGRAM: ICD-10-CM

## 2019-10-24 DIAGNOSIS — Z23 NEED FOR PROPHYLACTIC VACCINATION AND INOCULATION AGAINST INFLUENZA: Primary | ICD-10-CM

## 2019-10-24 PROCEDURE — 90662 IIV NO PRSV INCREASED AG IM: CPT

## 2019-10-24 PROCEDURE — 77063 BREAST TOMOSYNTHESIS BI: CPT

## 2019-10-24 PROCEDURE — G0008 ADMIN INFLUENZA VIRUS VAC: HCPCS

## 2019-11-07 ENCOUNTER — OFFICE VISIT (OUTPATIENT)
Dept: FAMILY MEDICINE | Facility: OTHER | Age: 69
End: 2019-11-07
Attending: FAMILY MEDICINE
Payer: MEDICARE

## 2019-11-07 VITALS
TEMPERATURE: 98.3 F | HEART RATE: 80 BPM | BODY MASS INDEX: 27.86 KG/M2 | WEIGHT: 157.25 LBS | SYSTOLIC BLOOD PRESSURE: 112 MMHG | DIASTOLIC BLOOD PRESSURE: 76 MMHG | HEIGHT: 63 IN | RESPIRATION RATE: 16 BRPM

## 2019-11-07 DIAGNOSIS — N30.01 ACUTE CYSTITIS WITH HEMATURIA: Primary | ICD-10-CM

## 2019-11-07 DIAGNOSIS — R30.0 DYSURIA: ICD-10-CM

## 2019-11-07 LAB
ALBUMIN UR-MCNC: NEGATIVE MG/DL
APPEARANCE UR: CLEAR
BILIRUB UR QL STRIP: NEGATIVE
COLOR UR AUTO: YELLOW
GLUCOSE UR STRIP-MCNC: NEGATIVE MG/DL
HGB UR QL STRIP: ABNORMAL
KETONES UR STRIP-MCNC: NEGATIVE MG/DL
LEUKOCYTE ESTERASE UR QL STRIP: ABNORMAL
NITRATE UR QL: NEGATIVE
NON-SQ EPI CELLS #/AREA URNS LPF: NORMAL /LPF
PH UR STRIP: 5 PH (ref 5–9)
RBC #/AREA URNS AUTO: NORMAL /HPF
SOURCE: ABNORMAL
SP GR UR STRIP: <1.005 (ref 1–1.03)
UROBILINOGEN UR STRIP-ACNC: 0.2 EU/DL (ref 0.2–1)
WBC #/AREA URNS AUTO: NORMAL /HPF

## 2019-11-07 PROCEDURE — G0463 HOSPITAL OUTPT CLINIC VISIT: HCPCS

## 2019-11-07 PROCEDURE — 87086 URINE CULTURE/COLONY COUNT: CPT | Mod: ZL | Performed by: FAMILY MEDICINE

## 2019-11-07 PROCEDURE — 81001 URINALYSIS AUTO W/SCOPE: CPT | Mod: ZL | Performed by: FAMILY MEDICINE

## 2019-11-07 PROCEDURE — 99213 OFFICE O/P EST LOW 20 MIN: CPT | Performed by: FAMILY MEDICINE

## 2019-11-07 RX ORDER — SULFAMETHOXAZOLE/TRIMETHOPRIM 800-160 MG
1 TABLET ORAL 2 TIMES DAILY
Qty: 14 TABLET | Refills: 0 | Status: SHIPPED | OUTPATIENT
Start: 2019-11-07 | End: 2019-11-14

## 2019-11-07 ASSESSMENT — MIFFLIN-ST. JEOR: SCORE: 1217.41

## 2019-11-07 ASSESSMENT — PATIENT HEALTH QUESTIONNAIRE - PHQ9: SUM OF ALL RESPONSES TO PHQ QUESTIONS 1-9: 5

## 2019-11-07 ASSESSMENT — PAIN SCALES - GENERAL: PAINLEVEL: MILD PAIN (2)

## 2019-11-07 NOTE — NURSING NOTE
"Chief Complaint   Patient presents with     Urinary Problem       Initial /76   Pulse 80   Temp 98.3  F (36.8  C) (Tympanic)   Resp 16   Ht 1.6 m (5' 3\")   Wt 71.3 kg (157 lb 4 oz)   BMI 27.86 kg/m   Estimated body mass index is 27.86 kg/m  as calculated from the following:    Height as of this encounter: 1.6 m (5' 3\").    Weight as of this encounter: 71.3 kg (157 lb 4 oz).  Medication Reconciliation: complete    Sarah Hurtado LPN  "

## 2019-11-07 NOTE — PROGRESS NOTES
"Nursing Notes:   Sarah Hurtado LPN  11/7/2019  2:22 PM  Sign at exiting of workspace  Chief Complaint   Patient presents with     Urinary Problem       Initial /76   Pulse 80   Temp 98.3  F (36.8  C) (Tympanic)   Resp 16   Ht 1.6 m (5' 3\")   Wt 71.3 kg (157 lb 4 oz)   BMI 27.86 kg/m    Estimated body mass index is 27.86 kg/m  as calculated from the following:    Height as of this encounter: 1.6 m (5' 3\").    Weight as of this encounter: 71.3 kg (157 lb 4 oz).  Medication Reconciliation: complete    Sarah Hrutado LPN    SUBJECTIVE:   Afia Medrano is a 68 year old female who presents to clinic today for the following health issues:    HPI  Mary is here for possible UTI.  Small amount of hematuria yesterday with wiping and noticed a little in her urine.  Suprapubic tenderness/pressure sensation when urinating.  + urgency.  Noticing smaller amount of urine.  Tried vagisil; but no other OTC regimens.  No real issues with bladder infections in the past that she can remember.  Is s/p hyst  No fevers/chills.  No flank pain.    Patient Active Problem List    Diagnosis Date Noted     MDD (major depressive disorder) 01/31/2018     Priority: Medium     Special screening for malignant neoplasms, colon 04/24/2014     Priority: Medium     Mixed dyslipidemia 11/23/2012     Priority: Medium     Synovitis and tenosynovitis 04/16/2012     Priority: Medium     Elbow pain 04/16/2012     Priority: Medium     Disorder of bursae and tendons in shoulder region 04/16/2012     Priority: Medium     Skin lesion 10/27/2011     Priority: Medium     Disorder of bone and cartilage 11/09/2010     Priority: Medium     Hypertension 02/15/2010     Priority: Medium     Sleep disorder 08/01/2007     Priority: Medium     Sigmoid diverticulosis 02/24/2003     Priority: Medium     Past Medical History:   Diagnosis Date     Basal cell carcinoma of skin of nose     No Comments Provided     Hyperlipidemia     lowered zocor 2015     " Other specified disorders of bone density and structure, unspecified site (CODE)     11/2014,DEXA,  t score -1.7, ca/vit d     Recurrent major depressive disorder (H)     No Comments Provided     Sleep disorder     No Comments Provided      Past Surgical History:   Procedure Laterality Date     COLONOSCOPY      2003,diverticulosis     COLONOSCOPY  04/24/2014    normal, repeat in 2024     OTHER SURGICAL HISTORY      RYQ114,PARTIAL HYSTERECTOMY,secondary to DUB. Ovaries in place. was on HRT, stopped patch in 7/2011     Family History   Problem Relation Age of Onset     Diabetes Father         Diabetes     Hypertension Father         Hypertension     Heart Disease Father         Heart Disease     Osteoporosis Mother         Osteoporosis,compression fracture     Heart Disease Mother         Heart Disease     Other - See Comments Other         negative breast cancer     Breast Cancer No family hx of         Cancer-breast     Social History     Tobacco Use     Smoking status: Never Smoker     Smokeless tobacco: Never Used   Substance Use Topics     Alcohol use: No     Social History     Patient does not qualify to have social determinant information on file (likely too young).   Social History Narrative    , remarried in 5/10, moved into his house 10/10,  2 adult children, 5 grandchildren, works as  of [employer].  Enjoys working with flowers,  mentee in [employer].    3 yr twin granddaughter live in [place].                Elderly father is at [facility]    Mother alive             Current Outpatient Medications   Medication Sig Dispense Refill     sulfamethoxazole-trimethoprim (BACTRIM DS/SEPTRA DS) 800-160 MG tablet Take 1 tablet by mouth 2 times daily for 7 days 14 tablet 0     aspirin EC 81 MG EC tablet Take 81 mg by mouth daily with food       Calcium Carb-Cholecalciferol 600-800 MG-UNIT CHEW Take 1 tablet by mouth daily       cholecalciferol (VITAMIN D-1000 MAX ST) 1000 UNITS TABS  "Take 1,000 Units by mouth daily       citalopram (CELEXA) 40 MG tablet Take 1 tablet (40 mg) by mouth At Bedtime 90 tablet 3     fluticasone (FLONASE) 50 MCG/ACT spray INHALE 1 SPRAY INTO BOTH NOSTRILS ONCE DAILY. 1 Bottle 3     GLUCOSAMINE SULFATE PO Take 1 tablet by mouth daily       metroNIDAZOLE (METROGEL) 0.75 % topical gel        omeprazole (PRILOSEC) 20 MG DR capsule Take 1 capsule (20 mg) by mouth 2 times daily Before a meal 180 capsule 3     simvastatin (ZOCOR) 10 MG tablet Take 1 tablet (10 mg) by mouth At Bedtime 90 tablet 3     triamcinolone (KENALOG) 0.1 % cream        zolpidem (AMBIEN) 10 MG tablet Take 1 tablet by mouth at bedtime if needed for Sleep 90 tablet 1     Allergies   Allergen Reactions     Seasonal Allergies Hives and Itching     Review of Systems   All other systems reviewed and are negative.       OBJECTIVE:     /76   Pulse 80   Temp 98.3  F (36.8  C) (Tympanic)   Resp 16   Ht 1.6 m (5' 3\")   Wt 71.3 kg (157 lb 4 oz)   BMI 27.86 kg/m    Body mass index is 27.86 kg/m .  Physical Exam  Vitals signs and nursing note reviewed.   Constitutional:       Appearance: Normal appearance.   Neck:      Musculoskeletal: Normal range of motion.   Cardiovascular:      Rate and Rhythm: Normal rate and regular rhythm.   Pulmonary:      Effort: Pulmonary effort is normal.   Abdominal:      Tenderness: There is no right CVA tenderness or left CVA tenderness.   Neurological:      Mental Status: She is alert.   Psychiatric:         Mood and Affect: Mood normal.         Judgement: Judgment normal.     Diagnostic Test Results:  Results for orders placed or performed in visit on 11/07/19   Urinalysis w Reflex Microscopic If Positive     Status: Abnormal   Result Value Ref Range    Color Urine Yellow     Appearance Urine Clear     Glucose Urine Negative NEG^Negative mg/dL    Bilirubin Urine Negative NEG^Negative    Ketones Urine Negative NEG^Negative mg/dL    Specific Gravity Urine <1.005 1.000 - 1.030 "    Blood Urine Trace (A) NEG^Negative    pH Urine 5.0 5.0 - 9.0 pH    Protein Albumin Urine Negative NEG^Negative mg/dL    Urobilinogen Urine 0.2 0.2 - 1.0 EU/dL    Nitrite Urine Negative NEG^Negative    Leukocyte Esterase Urine Trace (A) NEG^Negative    Source Midstream Urine    Urine Microscopic     Status: None   Result Value Ref Range    WBC Urine 0 - 5 OTO5^0 - 5 /HPF    RBC Urine O - 2 OTO2^O - 2 /HPF    Squamous Epithelial /LPF Urine Few FEW^Few /LPF       ASSESSMENT/PLAN:     1. Dysuria  - Urinalysis w Reflex Microscopic If Positive  - Urine Microscopic    2. Acute cystitis with hematuria  Symptoms concerning for UTI due to sudden start and pelvic pressure; along with + LE and + blood on UA today.  Start ABX therapy as prescribed below.  Encouraged increased fluid intake.  If not improving; would recommend follow up with PCP to evaluate further with possible imaging.  - sulfamethoxazole-trimethoprim (BACTRIM DS/SEPTRA DS) 800-160 MG tablet; Take 1 tablet by mouth 2 times daily for 7 days  Dispense: 14 tablet; Refill: 0  - Urine Culture Aerobic Bacterial      Milly Hinson [facility]

## 2019-11-10 LAB
BACTERIA SPEC CULT: NORMAL
SPECIMEN SOURCE: NORMAL

## 2020-01-14 DIAGNOSIS — G47.9 DISTURBANCE IN SLEEP BEHAVIOR: ICD-10-CM

## 2020-01-14 RX ORDER — ZOLPIDEM TARTRATE 10 MG/1
TABLET ORAL
Qty: 90 TABLET | Refills: 1 | Status: SHIPPED | OUTPATIENT
Start: 2020-01-14 | End: 2020-07-21

## 2020-01-14 NOTE — TELEPHONE ENCOUNTER
ZOLPIDEM TARTRATE 10 MG TABLET       Last Written Prescription Date:  7/2/2019  Last Fill Quantity: 90,   # refills: 1  Last Office Visit: 11/7/2019  Future Office visit:       Routing refill request to provider for review/approval because:  Will forward to ordering provider.  Drug not on the [facility], P or [facility] refill protocol or controlled substance.  Unable to complete prescription refill per RNMedication Refill Policy.................... Paige Concepcion RN ....................  1/14/2020   10:23 AM

## 2020-01-27 DIAGNOSIS — F33.41 MAJOR DEPRESSIVE DISORDER, RECURRENT EPISODE, IN PARTIAL REMISSION (H): ICD-10-CM

## 2020-01-28 RX ORDER — CITALOPRAM HYDROBROMIDE 40 MG/1
40 TABLET ORAL AT BEDTIME
Qty: 90 TABLET | Refills: 3 | Status: SHIPPED | OUTPATIENT
Start: 2020-01-28 | End: 2021-02-01

## 2020-01-28 NOTE — TELEPHONE ENCOUNTER
" Disp Refills Start End RUBEN   citalopram (CELEXA) 40 MG tablet 90 tablet 3 1/31/2019  No   Sig - Route: Take 1 tablet (40 mg) by mouth At Bedtime - Oral       LOV:11/7/2019 - Dr. Hinson    Future Office visit: No future appointment scheduled at this time.        Routing refill request to provider for review/approval because:  Failed Protocol    Requested Prescriptions   Pending Prescriptions Disp Refills     citalopram (CELEXA) 40 MG tablet [Pharmacy Med Name: CITALOPRAM HBR 40 MG TABLET] 90 tablet 1     Sig: TAKE 1 TABLET (40 MG) BY MOUTH AT BEDTIME       SSRIs Protocol Failed - 1/27/2020  1:54 AM        Failed - PHQ-9 score less than 5 in past 6 months     Please review last PHQ-9 score.   11/7/2019 scored 5        Passed - Medication is active on med list        Passed - Patient is age 18 or older        Passed - No active pregnancy on record        Passed - No positive pregnancy test in last 12 months        Passed - Recent (6 mo) or future (30 days) visit within the authorizing provider's specialty     Patient had office visit in the last 6 months or has a visit in the next 30 days with authorizing provider or within the authorizing provider's specialty.  See \"Patient Info\" tab in inbasket, or \"Choose Columns\" in Meds & Orders section of the refill encounter.          Unable to complete prescription refill per RN Medication Refill Policy.................... Emma Ayala RN ....................  1/28/2020   11:06 AM  "

## 2020-03-02 DIAGNOSIS — E78.2 MIXED DYSLIPIDEMIA: ICD-10-CM

## 2020-03-02 NOTE — LETTER
March 4, 2020      Afia Medrano  [address]  [place] [address]        Dear Afia,           This letter is to remind you that you are due for your annual exam with Joan Mckeon. Your last comprehensive visit was more than 12 months ago.    A refill request for simvastatin has been sent to your provider for review and consideration. Additional refills require you to complete this appointment.    Please call the clinic at [phone] to schedule your appointment.    If you should require additional refills before your scheduled appointment, please contact your pharmacy and we will refill your medication until the date of your appointment.    If you are no longer seeing Joan Mckeon for primary care, please call to let us know. Doing so will remove you from our call/contact list.      Thank you for choosing [facility] and [facility] for your health care needs.    Sincerely,    Refill RN  [facility]

## 2020-03-04 NOTE — TELEPHONE ENCOUNTER
" Disp Refills Start End RUBEN   simvastatin (ZOCOR) 10 MG tablet 90 tablet 3 1/31/2019  No   Sig - Route: Take 1 tablet (10 mg) by mouth At Bedtime - Oral       LOV: 1/31/2019  Future Office visit: No future appointment scheduled at this time.    Annual reminder letter sent via mail and [employer].    Routing refill request to provider for review/approval because:  Failed protocol    Requested Prescriptions   Pending Prescriptions Disp Refills     simvastatin (ZOCOR) 10 MG tablet [Pharmacy Med Name: SIMVASTATIN 10 MG TABLET] 90 tablet 3     Sig: TAKE 1 TABLET BY MOUTH EVERYDAY AT BEDTIME       Statins Protocol Failed - 3/2/2020  1:45 AM        Failed - LDL on file in past 12 months     Recent Labs   Lab Test 02/01/19  0848   LDL 97             Passed - No abnormal creatine kinase in past 12 months     No lab results found.             Passed - Recent (12 mo) or future (30 days) visit within the authorizing provider's specialty     Patient has had an office visit with the authorizing provider or a provider within the authorizing providers department within the previous 12 mos or has a future within next 30 days. See \"Patient Info\" tab in inbasket, or \"Choose Columns\" in Meds & Orders section of the refill encounter.              Passed - Medication is active on med list        Passed - Patient is age 18 or older        Passed - No active pregnancy on record        Passed - No positive pregnancy test in past 12 months      Unable to complete prescription refill per RN Medication Refill Policy.................... Emma Ayala RN ....................  3/4/2020   4:31 PM  "

## 2020-03-05 RX ORDER — SIMVASTATIN 10 MG
TABLET ORAL
Qty: 90 TABLET | Refills: 3 | Status: SHIPPED | OUTPATIENT
Start: 2020-03-05 | End: 2020-04-24

## 2020-03-11 ENCOUNTER — HEALTH MAINTENANCE LETTER (OUTPATIENT)
Age: 70
End: 2020-03-11

## 2020-04-24 ENCOUNTER — MYC REFILL (OUTPATIENT)
Dept: FAMILY MEDICINE | Facility: OTHER | Age: 70
End: 2020-04-24

## 2020-04-24 DIAGNOSIS — H69.90 EUSTACHIAN TUBE DISORDER, UNSPECIFIED LATERALITY: ICD-10-CM

## 2020-04-24 DIAGNOSIS — E78.2 MIXED DYSLIPIDEMIA: ICD-10-CM

## 2020-04-24 RX ORDER — SIMVASTATIN 10 MG
10 TABLET ORAL AT BEDTIME
Qty: 90 TABLET | Refills: 3 | Status: SHIPPED | OUTPATIENT
Start: 2020-04-24 | End: 2021-05-17

## 2020-04-24 RX ORDER — FLUTICASONE PROPIONATE 50 MCG
1 SPRAY, SUSPENSION (ML) NASAL AT BEDTIME
Qty: 1 G | Refills: 3 | Status: SHIPPED | OUTPATIENT
Start: 2020-04-24 | End: 2020-10-01

## 2020-04-27 ENCOUNTER — TELEPHONE (OUTPATIENT)
Dept: FAMILY MEDICINE | Facility: OTHER | Age: 70
End: 2020-04-27

## 2020-04-27 DIAGNOSIS — L71.9 ROSACEA: Primary | ICD-10-CM

## 2020-04-27 NOTE — TELEPHONE ENCOUNTER
[facility] sent Rx request for the following:      metroNIDAZOLE (METROGEL) 0.75 % topical gel     Last Prescription Date:   historical  Last Fill Qty/Refills:         n/a, R-n/a    Last Office Visit:              11/7/2019   Future Office visit:           7/21/2020    Routing refill request to provider for review/approval because:  Medication is reported/historical    In clinical absence of patient's primary, Joan Mckeon, patient is requesting that this message be sent to the primary provider's Teamlet for consideration please.      Unable to complete prescription refill per RN Medication Refill Policy.................... Armand Paredes RN ....................  4/27/2020   4:34 PM

## 2020-04-27 NOTE — TELEPHONE ENCOUNTER
States she tried to refill metronidazole .75% but it wasn't on her med list so she is needing a new prescription sent to [facility]

## 2020-04-28 RX ORDER — METRONIDAZOLE 7.5 MG/G
GEL TOPICAL 2 TIMES DAILY
Qty: 45 G | Refills: 11 | Status: SHIPPED | OUTPATIENT
Start: 2020-04-28 | End: 2021-10-12

## 2020-04-28 NOTE — TELEPHONE ENCOUNTER
Message left on patient's cell phone informing her prescription was sent to pharmacy. Clinic number left for any questions or further needs.     Elsa Loja MA on 4/28/2020 at 3:13 PM

## 2020-04-28 NOTE — TELEPHONE ENCOUNTER
Called patient and yes! She is using the medication for Rosacea.  Arleen Connor LPN on 4/28/2020 at 1:21 PM

## 2020-07-21 ENCOUNTER — OFFICE VISIT (OUTPATIENT)
Dept: FAMILY MEDICINE | Facility: OTHER | Age: 70
End: 2020-07-21
Attending: FAMILY MEDICINE
Payer: COMMERCIAL

## 2020-07-21 VITALS
OXYGEN SATURATION: 96 % | SYSTOLIC BLOOD PRESSURE: 116 MMHG | BODY MASS INDEX: 26.97 KG/M2 | WEIGHT: 152.2 LBS | HEIGHT: 63 IN | RESPIRATION RATE: 18 BRPM | DIASTOLIC BLOOD PRESSURE: 80 MMHG | HEART RATE: 75 BPM | TEMPERATURE: 98.2 F

## 2020-07-21 DIAGNOSIS — E78.5 DYSLIPIDEMIA: ICD-10-CM

## 2020-07-21 DIAGNOSIS — G47.9 DISTURBANCE IN SLEEP BEHAVIOR: ICD-10-CM

## 2020-07-21 DIAGNOSIS — Z11.59 ENCOUNTER FOR HEPATITIS C SCREENING TEST FOR LOW RISK PATIENT: ICD-10-CM

## 2020-07-21 DIAGNOSIS — L57.0 AK (ACTINIC KERATOSIS): ICD-10-CM

## 2020-07-21 DIAGNOSIS — Z00.00 ENCOUNTER FOR MEDICARE ANNUAL WELLNESS EXAM: Primary | ICD-10-CM

## 2020-07-21 DIAGNOSIS — K21.9 GASTROESOPHAGEAL REFLUX DISEASE, ESOPHAGITIS PRESENCE NOT SPECIFIED: ICD-10-CM

## 2020-07-21 DIAGNOSIS — Z85.828 HISTORY OF BASAL CELL CARCINOMA: ICD-10-CM

## 2020-07-21 LAB
ALBUMIN SERPL-MCNC: 4.3 G/DL (ref 3.5–5.7)
ALP SERPL-CCNC: 72 U/L (ref 34–104)
ALT SERPL W P-5'-P-CCNC: 15 U/L (ref 7–52)
ANION GAP SERPL CALCULATED.3IONS-SCNC: 7 MMOL/L (ref 3–14)
AST SERPL W P-5'-P-CCNC: 19 U/L (ref 13–39)
BILIRUB SERPL-MCNC: 0.9 MG/DL (ref 0.3–1)
BUN SERPL-MCNC: 17 MG/DL (ref 7–25)
CALCIUM SERPL-MCNC: 9.7 MG/DL (ref 8.6–10.3)
CHLORIDE SERPL-SCNC: 104 MMOL/L (ref 98–107)
CHOLEST SERPL-MCNC: 191 MG/DL
CO2 SERPL-SCNC: 31 MMOL/L (ref 21–31)
CREAT SERPL-MCNC: 0.97 MG/DL (ref 0.6–1.2)
GFR SERPL CREATININE-BSD FRML MDRD: 57 ML/MIN/{1.73_M2}
GLUCOSE SERPL-MCNC: 96 MG/DL (ref 70–105)
HDLC SERPL-MCNC: 59 MG/DL (ref 23–92)
LDLC SERPL CALC-MCNC: 102 MG/DL
NONHDLC SERPL-MCNC: 132 MG/DL
POTASSIUM SERPL-SCNC: 4.3 MMOL/L (ref 3.5–5.1)
PROT SERPL-MCNC: 7.4 G/DL (ref 6.4–8.9)
SODIUM SERPL-SCNC: 142 MMOL/L (ref 134–144)
TRIGL SERPL-MCNC: 150 MG/DL

## 2020-07-21 PROCEDURE — 17003 DESTRUCT PREMALG LES 2-14: CPT | Performed by: FAMILY MEDICINE

## 2020-07-21 PROCEDURE — 86803 HEPATITIS C AB TEST: CPT | Mod: ZL | Performed by: FAMILY MEDICINE

## 2020-07-21 PROCEDURE — 99213 OFFICE O/P EST LOW 20 MIN: CPT | Mod: 25 | Performed by: FAMILY MEDICINE

## 2020-07-21 PROCEDURE — 80053 COMPREHEN METABOLIC PANEL: CPT | Mod: ZL | Performed by: FAMILY MEDICINE

## 2020-07-21 PROCEDURE — G0463 HOSPITAL OUTPT CLINIC VISIT: HCPCS | Mod: 25 | Performed by: FAMILY MEDICINE

## 2020-07-21 PROCEDURE — 36415 COLL VENOUS BLD VENIPUNCTURE: CPT | Mod: ZL | Performed by: FAMILY MEDICINE

## 2020-07-21 PROCEDURE — G0472 HEP C SCREEN HIGH RISK/OTHER: HCPCS | Mod: ZL | Performed by: FAMILY MEDICINE

## 2020-07-21 PROCEDURE — 17000 DESTRUCT PREMALG LESION: CPT | Performed by: FAMILY MEDICINE

## 2020-07-21 PROCEDURE — 80061 LIPID PANEL: CPT | Mod: ZL | Performed by: FAMILY MEDICINE

## 2020-07-21 PROCEDURE — G0439 PPPS, SUBSEQ VISIT: HCPCS | Performed by: FAMILY MEDICINE

## 2020-07-21 RX ORDER — ZOLPIDEM TARTRATE 10 MG/1
TABLET ORAL
Qty: 90 TABLET | Refills: 1 | Status: SHIPPED | OUTPATIENT
Start: 2020-07-21 | End: 2021-03-16

## 2020-07-21 ASSESSMENT — PATIENT HEALTH QUESTIONNAIRE - PHQ9: SUM OF ALL RESPONSES TO PHQ QUESTIONS 1-9: 0

## 2020-07-21 ASSESSMENT — MIFFLIN-ST. JEOR: SCORE: 1185.53

## 2020-07-21 ASSESSMENT — PAIN SCALES - GENERAL: PAINLEVEL: NO PAIN (0)

## 2020-07-21 NOTE — PROGRESS NOTES
"SUBJECTIVE:   Afia Medrano is a 69 year old female who presents for Preventive Visit.    Nursing Notes:   Estefanía Hastings LPN  7/21/2020  9:53 AM  Signed  Patient is here for her annual physical and medicare wellness. States she has a few concerns, would like skin checked, an area on her back, and discuss heartburn.     Patient presents for MEDICARE ANNUALWELLNESS VISIT.    1. Have you fallen in the past 12 months? NO  2. Do you currently drive an automobile? YES  3. If yes, have you had anyaccidents in the past 12 months? NO          No LMP recorded (lmp unknown). Patient has had a hysterectomy.  Medication Reconciliation: jo-ann Hastings LPN  7/21/2020 9:34 AM    Pleasant 69-year-old female presents for annual Medicare wellness exam and follow-up of chronic medical problems.  GERD-well controlled on DD PPI, occasional use of TUMs  Mood disorder, currently stable on Celexa.   is currently going through chemotherapy for advanced colon cancer.  This is obviously contributing to someincreased stress.  She has a strong christina and family.  She uses Ambien each evening to sleep.  She has been stable on this medication for many years  Had a basal cell carcinoma excised from her back.  Would like a full skin exam today.  Has an area on her right cheek that flakes and does not seem to be healing.  Are you in the first 12 months of your Medicare Part B coverage?  No    Physical Health:    In general, how would you rate your overall physical health? excellent    Outside of work, how many days during the week do you exercise? 2-3 days/week    Outside of work, approximately how many minutes a day do you exercise?15-30 minutes    If you drink alcohol do you typically have >3 drinks per day or >7 drinks per week? No    Do you usually eat at least 4 servings of fruit and vegetables a day, include whole grains & fiber and avoid regularly eating high fat or \"junk\" foods? NO    Do you have any problems " taking medications regularly?  No    Do you have any side effects from medications? none    Needs assistance for the following daily activities: no assistance needed    Which of the following safety concerns are present in your home?  lack of grab bars in the bathroom     Hearing impairment: No    In the past 6 months, have you been bothered by leaking of urine? no    Mental Health:    In general, how would you rate your overall mental or emotional health? good  PHQ-2 Score: 0    Do you feel safe in your environment? Yes    Have you ever done Advance Care Planning? (For example, a Health Directive, POLST, or a discussion with a medical provider or your loved ones about your wishes): No, advance care planning information given to patient to review.  Patient plans to discuss their wishes with loved ones or provider.      Additional concerns to address?  YES    Fall risk:  Fallen 2 or more times in the past year?: No  Any fall with injury in the past year?: No    Cognitive Screenin) Repeat 3 items (Leader, Season, Table)    2) Clock draw: NORMAL  3) 3 item recall: Recalls 3 objects  Results: 3 items recalled: COGNITIVE IMPAIRMENT LESS LIKELY    Mini-CogTM Copyright BIANCA Dubois. Licensed by the author for use in [facility]; reprinted with permission (sobinta@[facility]). All rights reserved.      Do you have sleep apnea, excessive snoring or daytime drowsiness?: no        Hyperlipidemia Follow-Up      Are you regularly taking any medication or supplement to lower your cholesterol?   Yes- yes    Are you having muscle aches or other side effects that you think could be caused by your cholesterol lowering medication?  No    Depression Followup    How are you doing with your depression since your last visit? No change    Are you having other symptoms that might be associated with depression? No    Have you had a significant life event?  No     Are you feeling anxious or having panic attacks?   No    Do you have  any concerns with your use of alcohol or other drugs? No    Social History     Tobacco Use     Smoking status: Never Smoker     Smokeless tobacco: Never Used   Substance Use Topics     Alcohol use: No     Drug use: No     PHQ 1/31/2019 11/7/2019 7/21/2020   PHQ-9 Total Score 4 5 0   Q9: Thoughts of better off dead/self-harm past 2 weeks Not at all Not at all Not at all     ALMA-7 SCORE 1/31/2019   Total Score 7     Last PHQ-9 7/21/2020   1.  Little interest or pleasure in doing things 0   2.  Feeling down, depressed, or hopeless 0   3.  Trouble falling or staying asleep, or sleeping too much 0   4.  Feeling tired or having little energy 0   5.  Poor appetite or overeating 0   6.  Feeling bad about yourself 0   7.  Trouble concentrating 0   8.  Moving slowly or restless 0   Q9: Thoughts of better off dead/self-harm past 2 weeks 0   PHQ-9 Total Score 0   Difficulty at work, home, or with people -     ALMA-7  1/31/2019   1. Feeling nervous, anxious, or on edge 1   2. Not being able to stop or control worrying 1   3. Worrying too much about different things 1   4. Trouble relaxing 1   5. Being so restless that it is hard to sit still 1   6. Becoming easily annoyed or irritable 1   7. Feeling afraid, as if something awful might happen 1   ALMA-7 Total Score 7   If you checked any problems, how difficult have they made it for you to do your work, take care of things at home, or get along with other people? Not difficult at all         Suicide Assessment Five-step Evaluation and Treatment (SAFE-T)      Reviewed and updated as needed this visit by clinical staff  Tobacco  Allergies  Meds  Med Hx  Surg Hx  Fam Hx  Soc Hx        Reviewed and updated as needed this visit by Provider        Social History     Tobacco Use     Smoking status: Never Smoker     Smokeless tobacco: Never Used   Substance Use Topics     Alcohol use: No                           Current providers sharing in care for this patient include:   Patient  "Care Team:  Joan Mckeon MD as PCP - General (Family Practice)  Milly Hinson DO as Assigned PCP    The following health maintenance items are reviewed in Epic and correct as of today:  Health Maintenance   Topic Date Due     HEPATITIS C SCREENING  1950     ADVANCE CARE PLANNING  1950     DEPRESSION ACTION PLAN  1950     MEDICARE ANNUAL WELLNESS VISIT  01/31/2020     ZOSTER IMMUNIZATION (2 of 3) 02/28/2021 (Originally 1/30/2014)     INFLUENZA VACCINE (1) 09/01/2020     FALL RISK ASSESSMENT  11/07/2020     PHQ-9  01/21/2021     MAMMO SCREENING  10/24/2021     DTAP/TDAP/TD IMMUNIZATION (2 - Td) 11/23/2022     LIPID  02/01/2024     COLORECTAL CANCER SCREENING  04/24/2024     DEXA  Completed     PNEUMOCOCCAL IMMUNIZATION 65+ LOW/MEDIUM RISK  Completed     IPV IMMUNIZATION  Aged Out     MENINGITIS IMMUNIZATION  Aged Out     HEPATITIS B IMMUNIZATION  Aged Out     Lab work is in process  Pneumonia Vaccine:current  Mammogram Screening: Mammogram Screening: Patient over age 50, mutual decision to screen reflected in health maintenance.    ROS:  Constitutional, HEENT, cardiovascular, pulmonary, GI, , musculoskeletal, neuro, skin, endocrine and psych systems are negative, except as otherwise noted.    OBJECTIVE:   /80 (BP Location: Right arm, Patient Position: Sitting, Cuff Size: Adult Regular)   Pulse 75   Temp 98.2  F (36.8  C) (Tympanic)   Resp 18   Ht 1.594 m (5' 2.75\")   Wt 69 kg (152 lb 3.2 oz)   LMP  (LMP Unknown)   SpO2 96%   Breastfeeding No   BMI 27.18 kg/m   Estimated body mass index is 27.18 kg/m  as calculated from the following:    Height as of this encounter: 1.594 m (5' 2.75\").    Weight as of this encounter: 69 kg (152 lb 3.2 oz).  EXAM:   GENERAL: healthy, alert and no distress  EYES: Eyes grossly normal to inspection, PERRL and conjunctivae and sclerae normal  HENT: ear canals and TM's normal, nose and mouth without ulcers or lesions  NECK: no " adenopathy, no asymmetry, masses, or scars and thyroid normal to palpation  RESP: lungs clear to auscultation - no rales, rhonchi or wheezes  BREAST: normal without masses, tenderness or nipple discharge and no palpable axillary masses or adenopathy  CV: regular rate and rhythm, normal S1 S2, no S3 or S4, no murmur, click or rub, no peripheral edema and peripheral pulses strong  ABDOMEN: soft, nontender, no hepatosplenomegaly, no masses and bowel sounds normal  SKIN: Small AK on her left hand this is treated with liquid nitrogen and 3 freeze thaw cycles.  Small scaly patch on her right cheek is also treated with liquid nitrogen.  NEURO: Normal strength and tone, mentation intact and speech normal  PSYCH: mentation appears normal, affect normal/bright    Diagnostic Test Results:  Labs reviewed in Epic    ASSESSMENT / PLAN:   1. Gastroesophageal reflux disease, esophagitis presence not specified  Patient has not tolerated decreasing Prilosec.  Will continue on double dose for the time being.  Reinforce importance lifestyle changes.  - omeprazole (PRILOSEC) 20 MG DR capsule; Take 1 capsule (20 mg) by mouth 2 times daily Before a meal  Dispense: 180 capsule; Refill: 3    2. Disturbance in sleep behavior  Refilled Ambien for 1 year.  - zolpidem (AMBIEN) 10 MG tablet; TAKE ONE TABLET BY MOUTH AT BEDTIME IF NEEDED FOR SLEEP  Dispense: 90 tablet; Refill: 1    3. Encounter for Medicare annual wellness exam  Reviewed preventive cares.  Hepatitis C screen performed today.  Immunizations are current.  She could consider Shingrix, previously received Zostavax in 2013.  Recommend annual mammograms.  She will be due for colonoscopy in 2024.    4. Mixed dyslipidemia  Repeat lipid panel and CMP pending.  Refilled Zocor.  - Lipid Profile; Future  - Comprehensive Metabolic Panel; Future    5. Encounter for hepatitis C screening test for low risk patient    - Hepatitis C Screen Reflex to HCV RNA Quant and Genotype; Future    6. AK  "(actinic keratosis)  Full skin exam performed today.  She had 2 small case that were treated with liquid nitrogen.  Monitor for resolution.  Would consider biopsying the one on her cheek if it does not resolve.  - DESTRUCT PREMALIGNANT LESION, FIRST  - DESTRUCT PREMALIGNANT LESION, 2-14    COUNSELING:  Reviewed preventive health counseling, as reflected in patient instructions  Special attention given to:       Regular exercise       Healthy diet/nutrition       Vision screening       Hearing screening       Dental care       Bladder control       Fall risk prevention       Colon cancer screening       Hepatitis C screening    Estimated body mass index is 27.18 kg/m  as calculated from the following:    Height as of this encounter: 1.594 m (5' 2.75\").    Weight as of this encounter: 69 kg (152 lb 3.2 oz).    Weight management plan: Discussed healthy diet and exercise guidelines     reports that she has never smoked. She has never used smokeless tobacco.      Appropriate preventive services were discussed with this patient, including applicable screening as appropriate for cardiovascular disease, diabetes, osteopenia/osteoporosis, and glaucoma.  As appropriate for age/gender, discussed screening for colorectal cancer, prostate cancer, breast cancer, and cervical cancer. Checklist reviewing preventive services available has been given to the patient.    Reviewed patients plan of care and provided an AVS. The Basic Care Plan (routine screening as documented in Health Maintenance) for Afia meets the Care Plan requirement. This Care Plan has been established and reviewed with the Patient.    Counseling Resources:  ATP IV Guidelines  Pooled Cohorts Equation Calculator  Breast Cancer Risk Calculator  FRAX Risk Assessment  ICSI Preventive Guidelines  Dietary Guidelines for Americans, 2010  USDA's MyPlate  ASA Prophylaxis  Lung CA Screening    Joan Velazquez MD  [facility]  "

## 2020-07-21 NOTE — PATIENT INSTRUCTIONS
Refilled medications  Monitor skin lesions, call if do not resolve  Consider new shingles vaccine, Shinrix, best to get it at local pharmacy  Mammogram in the fall   Labs today      Patient Education   Personalized Prevention Plan  You are due for the preventive services outlined below.  Your care team is available to assist you in scheduling these services.  If you have already completed any of these items, please share that information with your care team to update in your medical record.  Health Maintenance Due   Topic Date Due     Hepatitis C Screening  1950     Discuss Advance Care Planning  1950     Depression Action Plan  1950     Annual Wellness Visit  01/31/2020

## 2020-07-21 NOTE — NURSING NOTE
Patient is here for her annual physical and medicare wellness. States she has a few concerns, would like skin checked, an area on her back, and discuss heartburn.     Patient presents for MEDICARE ANNUALWELLNESS VISIT.    1. Have you fallen in the past 12 months? NO  2. Do you currently drive an automobile? YES  3. If yes, have you had anyaccidents in the past 12 months? NO          No LMP recorded (lmp unknown). Patient has had a hysterectomy.  Medication Reconciliation: complete    Estefanía Hastings LPN  7/21/2020 9:34 AM

## 2020-07-22 LAB — HCV AB SERPL QL IA: NONREACTIVE

## 2020-10-01 DIAGNOSIS — H69.90 EUSTACHIAN TUBE DISORDER, UNSPECIFIED LATERALITY: ICD-10-CM

## 2020-10-01 RX ORDER — FLUTICASONE PROPIONATE 50 MCG
1 SPRAY, SUSPENSION (ML) NASAL AT BEDTIME
Qty: 16 ML | Refills: 3 | Status: SHIPPED | OUTPATIENT
Start: 2020-10-01 | End: 2022-02-07

## 2020-10-01 NOTE — TELEPHONE ENCOUNTER
" Disp Refills Start End RUBEN   fluticasone (FLONASE) 50 MCG/ACT nasal spray 1 g 3 4/24/2020  No   Sig - Route: Spray 1 spray into both nostrils At Bedtime - Both Nostrils       LOV: 7/21/2020  Future Office visit: No future appointment scheduled at this time.      Requested Prescriptions   Pending Prescriptions Disp Refills     fluticasone (FLONASE) 50 MCG/ACT nasal spray [Pharmacy Med Name: FLUTICASONE PROP 50 MCG SPRAY] 16 mL 3     Sig: SPRAY 1 SPRAY INTO BOTH NOSTRILS AT BEDTIME       Nasal Allergy Protocol Passed - 10/1/2020 12:13 AM        Passed - Patient is age 12 or older        Passed - Recent (12 mo) or future (30 days) visit within the authorizing provider's specialty     Patient has had an office visit with the authorizing provider or a provider within the authorizing providers department within the previous 12 mos or has a future within next 30 days. See \"Patient Info\" tab in inbasket, or \"Choose Columns\" in Meds & Orders section of the refill encounter.              Passed - Medication is active on med list         Emma Ayala RN  ....................  10/1/2020   2:51 PM      "

## 2020-11-18 ENCOUNTER — OFFICE VISIT (OUTPATIENT)
Dept: FAMILY MEDICINE | Facility: OTHER | Age: 70
End: 2020-11-18
Attending: FAMILY MEDICINE
Payer: MEDICARE

## 2020-11-18 VITALS
SYSTOLIC BLOOD PRESSURE: 108 MMHG | DIASTOLIC BLOOD PRESSURE: 70 MMHG | WEIGHT: 153.4 LBS | OXYGEN SATURATION: 94 % | HEART RATE: 76 BPM | BODY MASS INDEX: 27.18 KG/M2 | HEIGHT: 63 IN | RESPIRATION RATE: 16 BRPM | TEMPERATURE: 99 F

## 2020-11-18 DIAGNOSIS — Z01.818 PREOP GENERAL PHYSICAL EXAM: Primary | ICD-10-CM

## 2020-11-18 DIAGNOSIS — H26.9 CATARACT OF BOTH EYES, UNSPECIFIED CATARACT TYPE: ICD-10-CM

## 2020-11-18 DIAGNOSIS — L57.0 AK (ACTINIC KERATOSIS): ICD-10-CM

## 2020-11-18 PROCEDURE — 99213 OFFICE O/P EST LOW 20 MIN: CPT | Mod: 25 | Performed by: FAMILY MEDICINE

## 2020-11-18 PROCEDURE — 17003 DESTRUCT PREMALG LES 2-14: CPT | Performed by: FAMILY MEDICINE

## 2020-11-18 PROCEDURE — G0463 HOSPITAL OUTPT CLINIC VISIT: HCPCS | Mod: 25

## 2020-11-18 PROCEDURE — 17000 DESTRUCT PREMALG LESION: CPT | Performed by: FAMILY MEDICINE

## 2020-11-18 PROCEDURE — G0463 HOSPITAL OUTPT CLINIC VISIT: HCPCS

## 2020-11-18 RX ORDER — PREDNISOLONE ACETATE 10 MG/ML
SUSPENSION/ DROPS OPHTHALMIC
COMMUNITY
Start: 2020-11-10 | End: 2021-06-15

## 2020-11-18 RX ORDER — MOXIFLOXACIN 5 MG/ML
SOLUTION/ DROPS OPHTHALMIC
COMMUNITY
Start: 2020-11-10 | End: 2021-06-15

## 2020-11-18 RX ORDER — KETOROLAC TROMETHAMINE 5 MG/ML
SOLUTION OPHTHALMIC
COMMUNITY
Start: 2020-11-10 | End: 2021-06-15

## 2020-11-18 ASSESSMENT — PAIN SCALES - GENERAL: PAINLEVEL: NO PAIN (0)

## 2020-11-18 ASSESSMENT — MIFFLIN-ST. JEOR: SCORE: 1185.98

## 2020-11-18 NOTE — NURSING NOTE
Patient is here for a pre-op.       No LMP recorded (lmp unknown). Patient has had a hysterectomy.  Medication Reconciliation: complete    Estefanía Hastings LPN  11/18/2020 1:38 PM

## 2020-11-18 NOTE — PROGRESS NOTES
[facility] AND [facility]  [address]  [place] [address]  Phone: [phone]  Fax: [phone]  Primary Provider: Joan Mckeon  Pre-op Performing Provider: JOAN MCKEON    PREOPERATIVE EVALUATION:  Today's date: 11/18/2020    Afia Medrano is a 70 year old female who presents for a preoperative evaluation.    Surgical Information:  Surgery/Procedure: Right Eye Cataract   Surgery Location: [facility]  Surgeon: Dr Barba   Surgery Date: 11/30/20  Time of Surgery: TBD  Where patient plans to recover: At home with family  Fax number for surgical facility:     Type of Anesthesia Anticipated: Local    Subjective     HPI related to upcoming procedure:     69 yo female presents for preoperative evaluation prior to cataract surgery.    No recent covid exposure. NO current symptoms     is in hospice, doing okay.        Preop Questions 11/18/2020   1. Have you ever had a heart attack or stroke? No   2. Have you ever had surgery on your heart or blood vessels, such as a stent placement, a coronary artery bypass, or surgery on an artery in your head, neck, heart, or legs? No   3. Do you have chest pain with activity? No   4. Do you have a history of  heart failure? No   5. Do you currently have a cold, bronchitis or symptoms of other infection? No   6. Do you have a cough, shortness of breath, or wheezing? No   7. Do you or anyone in your family have previous history of blood clots? No   8. Do you or does anyone in your family have a serious bleeding problem such as prolonged bleeding following surgeries or cuts? No   9. Have you ever had problems with anemia or been told to take iron pills? No   10. Have you had any abnormal blood loss such as black, tarry or bloody stools, or abnormal vaginal bleeding? No   11. Have you ever had a blood transfusion? No   12. Are you willing to have a blood transfusion if it is medically needed before,  during, or after your surgery? Yes   13. Have you or any of your relatives ever had problems with anesthesia? No   14. Do you have sleep apnea, excessive snoring or daytime drowsiness? No   15. Do you have any artifical heart valves or other implanted medical devices like a pacemaker, defibrillator, or continuous glucose monitor? No   16. Do you have artificial joints? No   17. Are you allergic to latex? No       Health Care Directive:  Patient does not have a Health Care Directive or Living Will:     Preoperative Review of :        Status of Chronic Conditions:  HYPERLIPIDEMIA - Patient has a long history of significant Hyperlipidemia requiring medication for treatment with recent good control. Patient reports no problems or side effects with the medication.     HYPERTENSION - Patient has longstanding history of HTN , currently denies any symptoms referable to elevated blood pressure. Specifically denies chest pain, palpitations, dyspnea, orthopnea, PND or peripheral edema. Blood pressure readings have been in normal range. Current medication regimen is as listed below. Patient denies any side effects of medication.       Review of Systems  CONSTITUTIONAL: NEGATIVE for fever, chills, change in weight  INTEGUMENTARY/SKIN: NEGATIVE for worrisome rashes, moles or lesions  EYES: NEGATIVE for vision changes or irritation  ENT/MOUTH: NEGATIVE for ear, mouth and throat problems  RESP: NEGATIVE for significant cough or SOB  CV: NEGATIVE for chest pain, palpitations or peripheral edema  GI: NEGATIVE for nausea, abdominal pain, heartburn, or change in bowel habits  : NEGATIVE for frequency, dysuria, or hematuria  MUSCULOSKELETAL: NEGATIVE for significant arthralgias or myalgia  NEURO: NEGATIVE for weakness, dizziness or paresthesias  ENDOCRINE: NEGATIVE for temperature intolerance, skin/hair changes  HEME: NEGATIVE for bleeding problems  PSYCHIATRIC: NEGATIVE for changes in mood or affect    Patient Active Problem List     Diagnosis Date Noted     History of basal cell carcinoma 07/21/2020     Priority: Medium     MDD (major depressive disorder) 01/31/2018     Priority: Medium     Sleep disorder 08/01/2007     Priority: Medium     Sigmoid diverticulosis 02/24/2003     Priority: Medium      Past Medical History:   Diagnosis Date     Basal cell carcinoma of skin of nose     No Comments Provided     Hyperlipidemia     lowered zocor 2015     Other specified disorders of bone density and structure, unspecified site (CODE)     11/2014,DEXA,  t score -1.7, ca/vit d     Recurrent major depressive disorder (H)     No Comments Provided     Sleep disorder     No Comments Provided     Past Surgical History:   Procedure Laterality Date     COLONOSCOPY      2003,diverticulosis     COLONOSCOPY  04/24/2014    normal, repeat in 2024     OTHER SURGICAL HISTORY      RHY530,PARTIAL HYSTERECTOMY,secondary to DUB. Ovaries in place. was on HRT, stopped patch in 7/2011     Current Outpatient Medications   Medication Sig Dispense Refill     aspirin EC 81 MG EC tablet Take 81 mg by mouth daily with food       Calcium Carb-Cholecalciferol 600-800 MG-UNIT CHEW Take 1 tablet by mouth daily       cholecalciferol (VITAMIN D-1000 MAX ST) 1000 UNITS TABS Take 1,000 Units by mouth daily       citalopram (CELEXA) 40 MG tablet TAKE 1 TABLET (40 MG) BY MOUTH AT BEDTIME 90 tablet 3     fluticasone (FLONASE) 50 MCG/ACT nasal spray SPRAY 1 SPRAY INTO BOTH NOSTRILS AT BEDTIME 16 mL 3     GLUCOSAMINE SULFATE PO Take 1 tablet by mouth daily       metroNIDAZOLE (METROGEL) 0.75 % external gel Apply topically 2 times daily 45 g 11     omeprazole (PRILOSEC) 20 MG DR capsule Take 1 capsule (20 mg) by mouth 2 times daily Before a meal 180 capsule 3     simvastatin (ZOCOR) 10 MG tablet Take 1 tablet (10 mg) by mouth At Bedtime 90 tablet 3     triamcinolone (KENALOG) 0.1 % cream        zolpidem (AMBIEN) 10 MG tablet TAKE ONE TABLET BY MOUTH AT BEDTIME IF NEEDED FOR SLEEP 90 tablet  "1     ketorolac (ACULAR) 0.5 % ophthalmic solution PLACE 1 DROP INTO RIGHT EYE 4X A DAY X 3 DAYS BEFORE SURGERY, 1 WEEK AFTER, THEN 3X A DAY X 5 WEEKS       moxifloxacin (VIGAMOX) 0.5 % ophthalmic solution PLACE 1 DROP INTO RIGHT EYE 4X A DAY X 3 DAYS BEFORE SURGERY & CONTINUE FOR 1 WEEK AFTER       prednisoLONE acetate (PRED FORTE) 1 % ophthalmic suspension PLACE 1 DROP INTO RIGHT EYE 4X A DAY X 1 WEEK AFTER SURGERY, THEN 3X A DAY X 5 WEEKS         Allergies   Allergen Reactions     Seasonal Allergies Hives and Itching        Social History     Tobacco Use     Smoking status: Never Smoker     Smokeless tobacco: Never Used   Substance Use Topics     Alcohol use: No     Family History   Problem Relation Age of Onset     Diabetes Father         Diabetes     Hypertension Father         Hypertension     Heart Disease Father         Heart Disease     Osteoporosis Mother         Osteoporosis,compression fracture     Heart Disease Mother         Heart Disease     Other - See Comments Other         negative breast cancer     Breast Cancer No family hx of         Cancer-breast     History   Drug Use No         Objective     /70 (BP Location: Right arm, Patient Position: Sitting, Cuff Size: Adult Regular)   Pulse 76   Temp 99  F (37.2  C) (Tympanic)   Resp 16   Ht 1.594 m (5' 2.75\")   Wt 69.6 kg (153 lb 6.4 oz)   LMP  (LMP Unknown)   SpO2 94%   Breastfeeding No   BMI 27.39 kg/m      Physical Exam  GENERAL APPEARANCE: healthy, alert and no distress  HENT: ear canals and TM's normal and nose and mouth without ulcers or lesions  RESP: lungs clear to auscultation - no rales, rhonchi or wheezes  CV: regular rate and rhythm, normal S1 S2, no S3 or S4 and no murmur, click or rub   ABDOMEN: soft, nontender, no HSM or masses and bowel sounds normal  NEURO: Normal strength and tone, sensory exam grossly normal, mentation intact and speech normal  Skin: large AK on left hand, smaller on left forearm and left cheek, all " treated with Liguid nitrogen  Recent Labs   Lab Test 07/21/20  1028 02/01/19  0848    139   POTASSIUM 4.3 4.1   CR 0.97 0.93        Diagnostics:  No labs were ordered during this visit.   No EKG required for low risk surgery (cataract, skin procedure, breast biopsy, etc).    Revised Cardiac Risk Index (RCRI):  The patient has the following serious cardiovascular risks for perioperative complications:   - No serious cardiac risks = 0 points     RCRI Interpretation: 0 points: Class I (very low risk - 0.4% complication rate)           Assessment & Plan   The proposed surgical procedure is considered LOW risk.  1. Preop general physical exam    2. Cataract of both eyes, unspecified cataract type    3. AK (actinic keratosis)         Monitor for resolution of skin lesions, hand lesion is concerning for squamous cell, consider biopsy    Risks and Recommendations:  The patient has the following additional risks and recommendations for perioperative complications:   - No identified additional risk factors other than previously addressed    covid test scheduled    RECOMMENDATION:  APPROVAL GIVEN to proceed with proposed procedure, without further diagnostic evaluation.    Signed Electronically by: Joan Velazquez MD    Copy of this evaluation report is provided to requesting physician.    [facility] Pre[facility] Guidelines    Revised Cardiac Risk Index

## 2020-11-25 ENCOUNTER — ALLIED HEALTH/NURSE VISIT (OUTPATIENT)
Dept: FAMILY MEDICINE | Facility: OTHER | Age: 70
End: 2020-11-25
Payer: MEDICARE

## 2020-11-25 DIAGNOSIS — Z20.822 COVID-19 RULED OUT: Primary | ICD-10-CM

## 2020-11-25 LAB
SARS-COV-2 RNA SPEC QL NAA+PROBE: NORMAL
SPECIMEN SOURCE: NORMAL

## 2020-11-25 PROCEDURE — U0003 INFECTIOUS AGENT DETECTION BY NUCLEIC ACID (DNA OR RNA); SEVERE ACUTE RESPIRATORY SYNDROME CORONAVIRUS 2 (SARS-COV-2) (CORONAVIRUS DISEASE [COVID-19]), AMPLIFIED PROBE TECHNIQUE, MAKING USE OF HIGH THROUGHPUT TECHNOLOGIES AS DESCRIBED BY CMS-2020-01-R: HCPCS | Mod: ZL

## 2020-11-25 PROCEDURE — 99207 PR NO CHARGE NURSE ONLY: CPT

## 2020-11-25 PROCEDURE — C9803 HOPD COVID-19 SPEC COLLECT: HCPCS

## 2020-11-25 NOTE — NURSING NOTE
Patient swabbed for COVID-19 testing for surgery on 11/30/20.  Ashlye Felix LPN on 11/25/2020 at 9:58 AM

## 2020-11-26 LAB
LABORATORY COMMENT REPORT: NORMAL
SARS-COV-2 RNA SPEC QL NAA+PROBE: NEGATIVE
SPECIMEN SOURCE: NORMAL

## 2020-12-03 ENCOUNTER — HOSPITAL ENCOUNTER (OUTPATIENT)
Dept: MAMMOGRAPHY | Facility: OTHER | Age: 70
Discharge: HOME | End: 2020-12-03
Attending: FAMILY MEDICINE | Admitting: FAMILY MEDICINE
Payer: MEDICARE

## 2020-12-03 DIAGNOSIS — Z12.31 VISIT FOR SCREENING MAMMOGRAM: ICD-10-CM

## 2020-12-03 PROCEDURE — 77067 SCR MAMMO BI INCL CAD: CPT

## 2020-12-12 ENCOUNTER — ALLIED HEALTH/NURSE VISIT (OUTPATIENT)
Dept: FAMILY MEDICINE | Facility: OTHER | Age: 70
End: 2020-12-12
Attending: OPHTHALMOLOGY
Payer: MEDICARE

## 2020-12-12 DIAGNOSIS — Z20.822 COVID-19 RULED OUT: Primary | ICD-10-CM

## 2020-12-12 PROCEDURE — U0003 INFECTIOUS AGENT DETECTION BY NUCLEIC ACID (DNA OR RNA); SEVERE ACUTE RESPIRATORY SYNDROME CORONAVIRUS 2 (SARS-COV-2) (CORONAVIRUS DISEASE [COVID-19]), AMPLIFIED PROBE TECHNIQUE, MAKING USE OF HIGH THROUGHPUT TECHNOLOGIES AS DESCRIBED BY CMS-2020-01-R: HCPCS | Mod: ZL | Performed by: OPHTHALMOLOGY

## 2020-12-12 PROCEDURE — C9803 HOPD COVID-19 SPEC COLLECT: HCPCS

## 2020-12-13 LAB
SARS-COV-2 RNA SPEC QL NAA+PROBE: NOT DETECTED
SPECIMEN SOURCE: NORMAL

## 2021-01-31 DIAGNOSIS — F33.41 MAJOR DEPRESSIVE DISORDER, RECURRENT EPISODE, IN PARTIAL REMISSION (H): ICD-10-CM

## 2021-02-01 RX ORDER — CITALOPRAM HYDROBROMIDE 40 MG/1
TABLET ORAL
Qty: 90 TABLET | Refills: 3 | Status: SHIPPED | OUTPATIENT
Start: 2021-02-01 | End: 2022-02-07

## 2021-02-01 NOTE — TELEPHONE ENCOUNTER
" Disp Refills Start End RUBEN   citalopram (CELEXA) 40 MG tablet 90 tablet 3 1/28/2020  No   Sig - Route: TAKE 1 TABLET (40 MG) BY MOUTH AT BEDTIME - Oral       LOV: 11/18/2020  Future Office visit: NO future appointment scheduled at this time.      Routing refill request to provider for review/approval because:  Failed protocol    Requested Prescriptions   Pending Prescriptions Disp Refills     citalopram (CELEXA) 40 MG tablet [Pharmacy Med Name: CITALOPRAM HBR 40 MG TABLET] 90 tablet 3     Sig: TAKE 1 TABLET BY MOUTH AT BEDTIME       SSRIs Protocol Failed - 1/31/2021  9:13 AM        Failed - PHQ-9 score less than 5 in past 6 months     Please review last PHQ-9 score.   7/21/2020 0         Passed - Medication is active on med list        Passed - Patient is age 18 or older        Passed - No active pregnancy on record        Passed - No positive pregnancy test in last 12 months        Passed - Recent (6 mo) or future (30 days) visit within the authorizing provider's specialty     Patient had office visit in the last 6 months or has a visit in the next 30 days with authorizing provider or within the authorizing provider's specialty.  See \"Patient Info\" tab in inbasket, or \"Choose Columns\" in Meds & Orders section of the refill encounter.             Unable to complete prescription refill per RN Medication Refill Policy.................... Emma Ayala RN ....................  2/1/2021   11:11 AM        "

## 2021-03-15 DIAGNOSIS — G47.9 DISTURBANCE IN SLEEP BEHAVIOR: ICD-10-CM

## 2021-03-16 RX ORDER — ZOLPIDEM TARTRATE 10 MG/1
TABLET ORAL
Qty: 90 TABLET | Refills: 1 | Status: SHIPPED | OUTPATIENT
Start: 2021-03-16 | End: 2021-12-07

## 2021-03-16 NOTE — TELEPHONE ENCOUNTER
Disp Refills Start End RUBEN   zolpidem (AMBIEN) 10 MG tablet 90 tablet 1 7/21/2020  No   Sig: TAKE ONE TABLET BY MOUTH AT BEDTIME IF NEEDED FOR SLEEP       LOV: 11/18/2020  Future Office visit: No future appointment scheduled at this time.      Routing refill request to provider for review/approval because:  Drug not on the [facility], [facility] or [facility] refill protocol or controlled substance    Requested Prescriptions   Pending Prescriptions Disp Refills     zolpidem (AMBIEN) 10 MG tablet [Pharmacy Med Name: ZOLPIDEM TARTRATE 10 MG TABLET] 90 tablet      Sig: TAKE ONE TABLET BY MOUTH AT BEDTIME IF NEEDED FOR SLEEP       There is no refill protocol information for this order        Unable to complete prescription refill per RN Medication Refill Policy.................... Emma Ayala RN ....................  3/16/2021   12:53 PM

## 2021-05-15 DIAGNOSIS — E78.2 MIXED DYSLIPIDEMIA: ICD-10-CM

## 2021-05-17 RX ORDER — SIMVASTATIN 10 MG
TABLET ORAL
Qty: 90 TABLET | Refills: 1 | Status: SHIPPED | OUTPATIENT
Start: 2021-05-17 | End: 2022-03-23

## 2021-05-17 NOTE — TELEPHONE ENCOUNTER
" Disp Refills Start End RUBEN   simvastatin (ZOCOR) 10 MG tablet 90 tablet 3 4/24/2020  No   Sig - Route: Take 1 tablet (10 mg) by mouth At Bedtime - Oral       LOV: 11/18/2020  Future Office visit: No future appointment scheduled at this time.       Requested Prescriptions   Pending Prescriptions Disp Refills     simvastatin (ZOCOR) 10 MG tablet [Pharmacy Med Name: SIMVASTATIN 10 MG TABLET] 90 tablet 3     Sig: TAKE 1 TABLET BY MOUTH EVERYDAY AT BEDTIME       Statins Protocol Passed - 5/15/2021 11:35 AM        Passed - LDL on file in past 12 months     Recent Labs   Lab Test 07/21/20  1028   *             Passed - No abnormal creatine kinase in past 12 months     No lab results found.             Passed - Recent (12 mo) or future (30 days) visit within the authorizing provider's specialty     Patient has had an office visit with the authorizing provider or a provider within the authorizing providers department within the previous 12 mos or has a future within next 30 days. See \"Patient Info\" tab in inbasket, or \"Choose Columns\" in Meds & Orders section of the refill encounter.              Passed - Medication is active on med list        Passed - Patient is age 18 or older        Passed - No active pregnancy on record        Passed - No positive pregnancy test in past 12 months         Emma Ayala RN  ....................  5/17/2021   2:16 PM      "

## 2021-06-15 ENCOUNTER — OFFICE VISIT (OUTPATIENT)
Dept: FAMILY MEDICINE | Facility: OTHER | Age: 71
End: 2021-06-15
Attending: FAMILY MEDICINE
Payer: COMMERCIAL

## 2021-06-15 VITALS
WEIGHT: 160 LBS | OXYGEN SATURATION: 95 % | RESPIRATION RATE: 18 BRPM | HEART RATE: 78 BPM | TEMPERATURE: 99.4 F | SYSTOLIC BLOOD PRESSURE: 124 MMHG | DIASTOLIC BLOOD PRESSURE: 78 MMHG | BODY MASS INDEX: 28.57 KG/M2

## 2021-06-15 DIAGNOSIS — R13.10 DYSPHAGIA, UNSPECIFIED TYPE: ICD-10-CM

## 2021-06-15 DIAGNOSIS — R14.0 ABDOMINAL BLOATING: ICD-10-CM

## 2021-06-15 DIAGNOSIS — K21.00 GASTROESOPHAGEAL REFLUX DISEASE WITH ESOPHAGITIS, UNSPECIFIED WHETHER HEMORRHAGE: Primary | ICD-10-CM

## 2021-06-15 LAB
ALBUMIN SERPL-MCNC: 4 G/DL (ref 3.5–5.7)
ALP SERPL-CCNC: 67 U/L (ref 34–104)
ALT SERPL W P-5'-P-CCNC: 14 U/L (ref 7–52)
ANION GAP SERPL CALCULATED.3IONS-SCNC: 9 MMOL/L (ref 6–17)
AST SERPL W P-5'-P-CCNC: 18 U/L (ref 13–39)
BASOPHILS # BLD AUTO: 0.1 10E9/L (ref 0–0.2)
BASOPHILS NFR BLD AUTO: 1 %
BILIRUB SERPL-MCNC: 0.5 MG/DL (ref 0.3–1)
BUN SERPL-MCNC: 15 MG/DL (ref 7–25)
CALCIUM SERPL-MCNC: 9.1 MG/DL (ref 8.6–10.3)
CHLORIDE SERPL-SCNC: 104 MMOL/L (ref 98–107)
CO2 SERPL-SCNC: 26 MMOL/L (ref 21–31)
CREAT SERPL-MCNC: 0.92 MG/DL (ref 0.6–1.2)
DIFFERENTIAL METHOD BLD: NORMAL
EOSINOPHIL # BLD AUTO: 0.1 10E9/L (ref 0–0.7)
EOSINOPHIL NFR BLD AUTO: 2.6 %
ERYTHROCYTE [DISTWIDTH] IN BLOOD BY AUTOMATED COUNT: 12.5 % (ref 10–15)
GFR SERPL CREATININE-BSD FRML MDRD: NORMAL ML/MIN/{1.73_M2}
GLUCOSE SERPL-MCNC: 91 MG/DL (ref 70–105)
HCT VFR BLD AUTO: 39.3 % (ref 35–47)
HGB BLD-MCNC: 13.4 G/DL (ref 11.7–15.7)
IMM GRANULOCYTES # BLD: 0 10E9/L (ref 0–0.4)
IMM GRANULOCYTES NFR BLD: 0.2 %
LYMPHOCYTES # BLD AUTO: 1.4 10E9/L (ref 0.8–5.3)
LYMPHOCYTES NFR BLD AUTO: 27.6 %
MCH RBC QN AUTO: 30 PG (ref 26.5–33)
MCHC RBC AUTO-ENTMCNC: 34.1 G/DL (ref 31.5–36.5)
MCV RBC AUTO: 88 FL (ref 78–100)
MONOCYTES # BLD AUTO: 0.5 10E9/L (ref 0–1.3)
MONOCYTES NFR BLD AUTO: 9.7 %
NEUTROPHILS # BLD AUTO: 3 10E9/L (ref 1.6–8.3)
NEUTROPHILS NFR BLD AUTO: 58.9 %
PLATELET # BLD AUTO: 168 10E9/L (ref 150–450)
POTASSIUM SERPL-SCNC: 4.2 MMOL/L (ref 3.5–5.1)
PROT SERPL-MCNC: 6.9 G/DL (ref 6.4–8.9)
RBC # BLD AUTO: 4.46 10E12/L (ref 3.8–5.2)
SODIUM SERPL-SCNC: 139 MMOL/L (ref 134–144)
WBC # BLD AUTO: 5 10E9/L (ref 4–11)

## 2021-06-15 PROCEDURE — 36415 COLL VENOUS BLD VENIPUNCTURE: CPT | Mod: ZL | Performed by: FAMILY MEDICINE

## 2021-06-15 PROCEDURE — 80053 COMPREHEN METABOLIC PANEL: CPT | Mod: ZL | Performed by: FAMILY MEDICINE

## 2021-06-15 PROCEDURE — 85025 COMPLETE CBC W/AUTO DIFF WBC: CPT | Mod: ZL | Performed by: FAMILY MEDICINE

## 2021-06-15 PROCEDURE — G0463 HOSPITAL OUTPT CLINIC VISIT: HCPCS

## 2021-06-15 PROCEDURE — 99214 OFFICE O/P EST MOD 30 MIN: CPT | Performed by: FAMILY MEDICINE

## 2021-06-15 RX ORDER — SUCRALFATE 1 G/1
1 TABLET ORAL 2 TIMES DAILY
Qty: 60 TABLET | Refills: 0 | Status: SHIPPED | OUTPATIENT
Start: 2021-06-15 | End: 2021-07-15

## 2021-06-15 ASSESSMENT — PATIENT HEALTH QUESTIONNAIRE - PHQ9
5. POOR APPETITE OR OVEREATING: SEVERAL DAYS
SUM OF ALL RESPONSES TO PHQ QUESTIONS 1-9: 8

## 2021-06-15 ASSESSMENT — ANXIETY QUESTIONNAIRES
IF YOU CHECKED OFF ANY PROBLEMS ON THIS QUESTIONNAIRE, HOW DIFFICULT HAVE THESE PROBLEMS MADE IT FOR YOU TO DO YOUR WORK, TAKE CARE OF THINGS AT HOME, OR GET ALONG WITH OTHER PEOPLE: SOMEWHAT DIFFICULT
1. FEELING NERVOUS, ANXIOUS, OR ON EDGE: MORE THAN HALF THE DAYS
2. NOT BEING ABLE TO STOP OR CONTROL WORRYING: NOT AT ALL
7. FEELING AFRAID AS IF SOMETHING AWFUL MIGHT HAPPEN: SEVERAL DAYS
GAD7 TOTAL SCORE: 8
3. WORRYING TOO MUCH ABOUT DIFFERENT THINGS: MORE THAN HALF THE DAYS
6. BECOMING EASILY ANNOYED OR IRRITABLE: SEVERAL DAYS
5. BEING SO RESTLESS THAT IT IS HARD TO SIT STILL: SEVERAL DAYS

## 2021-06-15 ASSESSMENT — PAIN SCALES - GENERAL: PAINLEVEL: NO PAIN (0)

## 2021-06-15 NOTE — LETTER
My Depression Action Plan  Name: Afia Medrano   Date of Birth 1950  Date: 6/15/2021    My doctor: Joan Mckeon   My clinic: [facility]  [address]  [place] [address]  [phone]          GREEN    ZONE   Good Control    What it looks like:     Things are going generally well. You have normal ups and downs. You may even feel depressed from time to time, but bad moods usually last less than a day.   What you need to do:  1. Continue to care for yourself (see self care plan)  2. Check your depression survival kit and update it as needed  3. Follow your physician s recommendations including any medication.  4. Do not stop taking medication unless you consult with your physician first.           YELLOW         ZONE Getting Worse    What it looks like:     Depression is starting to interfere with your life.     It may be hard to get out of bed; you may be starting to isolate yourself from others.    Symptoms of depression are starting to last most all day and this has happened for several days.     You may have suicidal thoughts but they are not constant.   What you need to do:     1. Call your care team. Your response to treatment will improve if you keep your care team informed of your progress. Yellow periods are signs an adjustment may need to be made.     2. Continue your self-care.  Just get dressed and ready for the day.  Don't give yourself time to talk yourself out of it.    3. Talk to someone in your support network.    4. Open up your Depression Self-Care Plan/Wellness Kit.           RED    ZONE Medical Alert - Get Help    What it looks like:     Depression is seriously interfering with your life.     You may experience these or other symptoms: You can t get out of bed most days, can t work or engage in other necessary activities, you have trouble taking care of basic hygiene, or basic responsibilities, thoughts of suicide or  death that will not go away, self-injurious behavior.     What you need to do:  1. Call your care team and request a same-day appointment. If they are not available (weekends or after hours) call your local crisis line, emergency room or 911.          Depression Self-Care Plan / Wellness Kit    Many people find that medication and therapy are helpful treatments for managing depression. In addition, making small changes to your everyday life can help to boost your mood and improve your wellbeing. Below are some tips for you to consider. Be sure to talk with your medical provider and/or behavioral health consultant if your symptoms are worsening or not improving.     Sleep   Sleep hygiene  means all of the habits that support good, restful sleep. It includes maintaining a consistent bedtime and wake time, using your bedroom only for sleeping or sex, and keeping the bedroom dark and free of distractions like a computer, smartphone, or television.     Develop a Healthy Routine  Maintain good hygiene. Get out of bed in the morning, make your bed, brush your teeth, take a shower, and get dressed. Don t spend too much time viewing media that makes you feel stressed. Find time to relax each day.    Exercise  Get some form of exercise every day. This will help reduce pain and release endorphins, the  feel good  chemicals in your brain. It can be as simple as just going for a walk or doing some gardening, anything that will get you moving.      Diet  Strive to eat healthy foods, including fruits and vegetables. Drink plenty of water. Avoid excessive sugar, caffeine, alcohol, and other mood-altering substances.     Stay Connected with Others  Stay in touch with friends and family members.    Manage Your Mood  Try deep breathing, massage therapy, biofeedback, or meditation. Take part in fun activities when you can. Try to find something to smile about each day.     Psychotherapy  Be open to working with a therapist if your  provider recommends it.     Medication  Be sure to take your medication as prescribed. Most anti-depressants need to be taken every day. It usually takes several weeks for medications to work. Not all medicines work for all people. It is important to follow-up with your provider to make sure you have a treatment plan that is working for you. Do not stop your medication abruptly without first discussing it with your provider.    Crisis Resources   These hotlines are for both adults and children. They and are open 24 hours a day, 7 days a week unless noted otherwise.      National Suicide Prevention Lifeline   3-878-432-GYWR (5313)      Crisis Text Line    www.crisistextline.org  Text HOME to 242104 from anywhere in the [place], anytime, about any type of crisis. A live, trained crisis counselor will receive the text and respond quickly.      [place] Lifeline for LGBTQ Youth  A national crisis intervention and suicide lifeline for LGBTQ youth under 25. Provides a safe place to talk without judgement. Call 1-949.287.3398; text START to 816123 or visit www.thetrevorproject.org to talk to a trained counselor.      For [facility] crisis numbers, visit the [facility] website at:  https://[place].gov/dhs/people-we-serve/adults/health-care/mental-health/resources/crisis-contacts.jsp

## 2021-06-15 NOTE — H&P (VIEW-ONLY)
SUBJECTIVE:   Afia Medrano is a 70 year old female who presents to clinic today for the following health issues:  Nursing Notes:   Estefanía Hastings LPN  6/15/2021  3:15 PM  Signed  Patient is here to discuss acid reflux, states has been getting worse.  Is currently taking Prilosec 20 mg BID.     No LMP recorded (lmp unknown). Patient has had a hysterectomy.  Medication Reconciliation: complete    Estefanía Hastings LPN  6/15/2021 2:53 PM        HPI    69 yo female presents to discuss acid reflux that has worsened in the past few weeks despite DD PPI, prilosec 20 mg bid.    Regurgitation, coughing at night, excessive gas.    Normal BMs    NO previous EGD.    Patient Active Problem List    Diagnosis Date Noted     History of basal cell carcinoma 07/21/2020     Priority: Medium     MDD (major depressive disorder) 01/31/2018     Priority: Medium     Sleep disorder 08/01/2007     Priority: Medium     Sigmoid diverticulosis 02/24/2003     Priority: Medium     Past Medical History:   Diagnosis Date     Basal cell carcinoma of skin of nose     No Comments Provided     Hyperlipidemia     lowered zocor 2015     Other specified disorders of bone density and structure, unspecified site (CODE)     11/2014,DEXA,  t score -1.7, ca/vit d     Recurrent major depressive disorder (H)     No Comments Provided     Sleep disorder     No Comments Provided        Review of Systems   All other systems reviewed and are negative.       OBJECTIVE:     /78 (BP Location: Right arm, Patient Position: Sitting, Cuff Size: Adult Regular)   Pulse 78   Temp 99.4  F (37.4  C) (Tympanic)   Resp 18   Wt 72.6 kg (160 lb)   LMP  (LMP Unknown)   SpO2 95%   Breastfeeding No   BMI 28.57 kg/m    Body mass index is 28.57 kg/m .  Physical Exam  Cardiovascular:      Heart sounds: No murmur.   Pulmonary:      Effort: Pulmonary effort is normal. No respiratory distress.      Breath sounds: No wheezing.   Abdominal:      General: Abdomen is  flat. There is no distension.      Palpations: There is no mass.      Tenderness: There is no abdominal tenderness.      Hernia: No hernia is present.   Neurological:      Mental Status: She is alert.         Diagnostic Test Results:  No results found for this or any previous visit (from the past 24 hour(s)).    ASSESSMENT/PLAN:       1. Gastroesophageal reflux disease with esophagitis, unspecified whether hemorrhage    2. Abdominal bloating    3. Dysphagia, unspecified type        Increased acid reflux of unclear etiology.  Continue PPI and add Carafate.  Proceed with abdominal ultrasound to rule out cholelithiasis.  Patient is never had upper endoscopy done I think that is very reasonable given worsening symptoms including feeling of dysphagia.    Patient Instructions   Schedule abdominal ultrasound to evaluate gallbladder    Schedule upper endoscopy    Continue prilosec 40 mg daily    Add carafate 2 x daily            Joan Velazquez MD  [facility]

## 2021-06-15 NOTE — PROGRESS NOTES
SUBJECTIVE:   Afia Medrano is a 70 year old female who presents to clinic today for the following health issues:  Nursing Notes:   Estefanía Hastings LPN  6/15/2021  3:15 PM  Signed  Patient is here to discuss acid reflux, states has been getting worse.  Is currently taking Prilosec 20 mg BID.     No LMP recorded (lmp unknown). Patient has had a hysterectomy.  Medication Reconciliation: complete    Estefanía Hastings LPN  6/15/2021 2:53 PM        HPI    71 yo female presents to discuss acid reflux that has worsened in the past few weeks despite DD PPI, prilosec 20 mg bid.    Regurgitation, coughing at night, excessive gas.    Normal BMs    NO previous EGD.    Patient Active Problem List    Diagnosis Date Noted     History of basal cell carcinoma 07/21/2020     Priority: Medium     MDD (major depressive disorder) 01/31/2018     Priority: Medium     Sleep disorder 08/01/2007     Priority: Medium     Sigmoid diverticulosis 02/24/2003     Priority: Medium     Past Medical History:   Diagnosis Date     Basal cell carcinoma of skin of nose     No Comments Provided     Hyperlipidemia     lowered zocor 2015     Other specified disorders of bone density and structure, unspecified site (CODE)     11/2014,DEXA,  t score -1.7, ca/vit d     Recurrent major depressive disorder (H)     No Comments Provided     Sleep disorder     No Comments Provided        Review of Systems   All other systems reviewed and are negative.       OBJECTIVE:     /78 (BP Location: Right arm, Patient Position: Sitting, Cuff Size: Adult Regular)   Pulse 78   Temp 99.4  F (37.4  C) (Tympanic)   Resp 18   Wt 72.6 kg (160 lb)   LMP  (LMP Unknown)   SpO2 95%   Breastfeeding No   BMI 28.57 kg/m    Body mass index is 28.57 kg/m .  Physical Exam  Cardiovascular:      Heart sounds: No murmur.   Pulmonary:      Effort: Pulmonary effort is normal. No respiratory distress.      Breath sounds: No wheezing.   Abdominal:      General: Abdomen is  flat. There is no distension.      Palpations: There is no mass.      Tenderness: There is no abdominal tenderness.      Hernia: No hernia is present.   Neurological:      Mental Status: She is alert.         Diagnostic Test Results:  No results found for this or any previous visit (from the past 24 hour(s)).    ASSESSMENT/PLAN:       1. Gastroesophageal reflux disease with esophagitis, unspecified whether hemorrhage    2. Abdominal bloating    3. Dysphagia, unspecified type        Increased acid reflux of unclear etiology.  Continue PPI and add Carafate.  Proceed with abdominal ultrasound to rule out cholelithiasis.  Patient is never had upper endoscopy done I think that is very reasonable given worsening symptoms including feeling of dysphagia.    Patient Instructions   Schedule abdominal ultrasound to evaluate gallbladder    Schedule upper endoscopy    Continue prilosec 40 mg daily    Add carafate 2 x daily            Joan Velazquez MD  [facility]

## 2021-06-15 NOTE — NURSING NOTE
Patient is here to discuss acid reflux, states has been getting worse.  Is currently taking Prilosec 20 mg BID.     No LMP recorded (lmp unknown). Patient has had a hysterectomy.  Medication Reconciliation: jo-ann Hastings LPN  6/15/2021 2:53 PM

## 2021-06-15 NOTE — PATIENT INSTRUCTIONS
Schedule abdominal ultrasound to evaluate gallbladder    Schedule upper endoscopy    Continue prilosec 40 mg daily    Add carafate 2 x daily

## 2021-06-16 ENCOUNTER — TELEPHONE (OUTPATIENT)
Dept: SURGERY | Facility: OTHER | Age: 71
End: 2021-06-16

## 2021-06-16 DIAGNOSIS — R13.10 DYSPHAGIA, UNSPECIFIED TYPE: Primary | ICD-10-CM

## 2021-06-16 ASSESSMENT — ANXIETY QUESTIONNAIRES: GAD7 TOTAL SCORE: 8

## 2021-06-16 NOTE — TELEPHONE ENCOUNTER
Screening Questions for the Scheduling of Screening Colonoscopies   (If Colonoscopy is diagnostic, Provider should review the chart before scheduling.)  Are you younger than 50 or older than 80?   NO   Do you take aspirin or fish oil?  YES - ASPIRIN    (if yes, tell patient to stop 1 week prior to Colonoscopy)  Do you take warfarin (Coumadin), clopidogrel (Plavix), apixaban (Eliquis), dabigatram (Pradaxa), rivaroxaban (Xarelto) or any blood thinner? NO   Do you use oxygen at home?  NO   Do you have kidney disease? NO   Are you on dialysis? NO   Have you had a stroke or heart attack in the last year? NO   Have you had a stent in your heart or any blood vessel in the last year? NO   Have you had a transplant of any organ? NO   Have you had a colonoscopy or upper endoscopy (EGD) before? NO          When?    Date of scheduled EGD  06/28/2021  Provider [facility]   Pharmacy

## 2021-06-16 NOTE — TELEPHONE ENCOUNTER
Patient referred by Dr. Velazquez for a Upper GI endoscopy ,   Diagnosis is gastroesophageal reflux disease with esophagitis and Dysphasia.   Please advise if ok to schedule.    Thank you.  Nancy Henry on 6/16/2021 at 7:56 AM

## 2021-06-18 ENCOUNTER — HOSPITAL ENCOUNTER (OUTPATIENT)
Dept: ULTRASOUND IMAGING | Facility: OTHER | Age: 71
Discharge: HOME | End: 2021-06-18
Attending: FAMILY MEDICINE | Admitting: FAMILY MEDICINE
Payer: MEDICARE

## 2021-06-18 DIAGNOSIS — R14.0 ABDOMINAL BLOATING: ICD-10-CM

## 2021-06-18 PROCEDURE — 76705 ECHO EXAM OF ABDOMEN: CPT

## 2021-06-22 PROBLEM — R13.19 OTHER DYSPHAGIA: Status: ACTIVE | Noted: 2021-06-22

## 2021-06-24 ENCOUNTER — ALLIED HEALTH/NURSE VISIT (OUTPATIENT)
Dept: FAMILY MEDICINE | Facility: OTHER | Age: 71
End: 2021-06-24
Attending: SURGERY
Payer: MEDICARE

## 2021-06-24 DIAGNOSIS — R13.10 DYSPHAGIA, UNSPECIFIED TYPE: ICD-10-CM

## 2021-06-24 DIAGNOSIS — Z01.812 ENCOUNTER FOR PREPROCEDURE SCREENING LABORATORY TESTING FOR COVID-19: Primary | ICD-10-CM

## 2021-06-24 DIAGNOSIS — Z11.52 ENCOUNTER FOR PREPROCEDURE SCREENING LABORATORY TESTING FOR COVID-19: Primary | ICD-10-CM

## 2021-06-24 LAB
LABORATORY COMMENT REPORT: NORMAL
SARS-COV-2 RNA RESP QL NAA+PROBE: NEGATIVE
SARS-COV-2 RNA RESP QL NAA+PROBE: NORMAL
SPECIMEN SOURCE: NORMAL
SPECIMEN SOURCE: NORMAL

## 2021-06-24 PROCEDURE — C9803 HOPD COVID-19 SPEC COLLECT: HCPCS

## 2021-06-24 PROCEDURE — U0005 INFEC AGEN DETEC AMPLI PROBE: HCPCS | Mod: ZL | Performed by: SURGERY

## 2021-06-24 PROCEDURE — U0003 INFECTIOUS AGENT DETECTION BY NUCLEIC ACID (DNA OR RNA); SEVERE ACUTE RESPIRATORY SYNDROME CORONAVIRUS 2 (SARS-COV-2) (CORONAVIRUS DISEASE [COVID-19]), AMPLIFIED PROBE TECHNIQUE, MAKING USE OF HIGH THROUGHPUT TECHNOLOGIES AS DESCRIBED BY CMS-2020-01-R: HCPCS | Mod: ZL | Performed by: SURGERY

## 2021-06-28 ENCOUNTER — ANESTHESIA (OUTPATIENT)
Dept: SURGERY | Facility: OTHER | Age: 71
End: 2021-06-28
Payer: MEDICARE

## 2021-06-28 ENCOUNTER — HOSPITAL ENCOUNTER (OUTPATIENT)
Facility: OTHER | Age: 71
Discharge: HOME | End: 2021-06-28
Attending: SURGERY | Admitting: SURGERY
Payer: MEDICARE

## 2021-06-28 ENCOUNTER — ANESTHESIA EVENT (OUTPATIENT)
Dept: SURGERY | Facility: OTHER | Age: 71
End: 2021-06-28
Payer: MEDICARE

## 2021-06-28 VITALS
SYSTOLIC BLOOD PRESSURE: 152 MMHG | BODY MASS INDEX: 28.35 KG/M2 | DIASTOLIC BLOOD PRESSURE: 98 MMHG | HEIGHT: 63 IN | WEIGHT: 160 LBS | OXYGEN SATURATION: 95 % | HEART RATE: 67 BPM | RESPIRATION RATE: 16 BRPM | TEMPERATURE: 96.5 F

## 2021-06-28 PROBLEM — K29.70 GASTRITIS: Status: ACTIVE | Noted: 2021-06-28

## 2021-06-28 PROBLEM — K44.9 SLIDING HIATAL HERNIA: Status: ACTIVE | Noted: 2021-06-28

## 2021-06-28 PROCEDURE — 43239 EGD BIOPSY SINGLE/MULTIPLE: CPT | Performed by: SURGERY

## 2021-06-28 PROCEDURE — 43239 EGD BIOPSY SINGLE/MULTIPLE: CPT | Performed by: NURSE ANESTHETIST, CERTIFIED REGISTERED

## 2021-06-28 PROCEDURE — 258N000003 HC RX IP 258 OP 636: Performed by: SURGERY

## 2021-06-28 PROCEDURE — 250N000009 HC RX 250: Performed by: NURSE ANESTHETIST, CERTIFIED REGISTERED

## 2021-06-28 PROCEDURE — 250N000011 HC RX IP 250 OP 636: Performed by: NURSE ANESTHETIST, CERTIFIED REGISTERED

## 2021-06-28 PROCEDURE — 88305 TISSUE EXAM BY PATHOLOGIST: CPT

## 2021-06-28 RX ORDER — PROPOFOL 10 MG/ML
INJECTION, EMULSION INTRAVENOUS CONTINUOUS PRN
Status: DISCONTINUED | OUTPATIENT
Start: 2021-06-28 | End: 2021-06-28

## 2021-06-28 RX ORDER — FLUMAZENIL 0.1 MG/ML
0.2 INJECTION, SOLUTION INTRAVENOUS
Status: DISCONTINUED | OUTPATIENT
Start: 2021-06-28 | End: 2021-06-28 | Stop reason: HOSPADM

## 2021-06-28 RX ORDER — NALOXONE HYDROCHLORIDE 0.4 MG/ML
0.4 INJECTION, SOLUTION INTRAMUSCULAR; INTRAVENOUS; SUBCUTANEOUS
Status: DISCONTINUED | OUTPATIENT
Start: 2021-06-28 | End: 2021-06-28 | Stop reason: HOSPADM

## 2021-06-28 RX ORDER — LIDOCAINE 40 MG/G
CREAM TOPICAL
Status: DISCONTINUED | OUTPATIENT
Start: 2021-06-28 | End: 2021-06-28 | Stop reason: HOSPADM

## 2021-06-28 RX ORDER — SODIUM CHLORIDE, SODIUM LACTATE, POTASSIUM CHLORIDE, CALCIUM CHLORIDE 600; 310; 30; 20 MG/100ML; MG/100ML; MG/100ML; MG/100ML
INJECTION, SOLUTION INTRAVENOUS CONTINUOUS
Status: DISCONTINUED | OUTPATIENT
Start: 2021-06-28 | End: 2021-06-28 | Stop reason: HOSPADM

## 2021-06-28 RX ORDER — NALOXONE HYDROCHLORIDE 0.4 MG/ML
0.2 INJECTION, SOLUTION INTRAMUSCULAR; INTRAVENOUS; SUBCUTANEOUS
Status: DISCONTINUED | OUTPATIENT
Start: 2021-06-28 | End: 2021-06-28 | Stop reason: HOSPADM

## 2021-06-28 RX ORDER — LIDOCAINE HYDROCHLORIDE 20 MG/ML
INJECTION, SOLUTION INFILTRATION; PERINEURAL PRN
Status: DISCONTINUED | OUTPATIENT
Start: 2021-06-28 | End: 2021-06-28

## 2021-06-28 RX ORDER — ONDANSETRON 2 MG/ML
4 INJECTION INTRAMUSCULAR; INTRAVENOUS
Status: DISCONTINUED | OUTPATIENT
Start: 2021-06-28 | End: 2021-06-28 | Stop reason: HOSPADM

## 2021-06-28 RX ORDER — PROPOFOL 10 MG/ML
INJECTION, EMULSION INTRAVENOUS PRN
Status: DISCONTINUED | OUTPATIENT
Start: 2021-06-28 | End: 2021-06-28

## 2021-06-28 RX ADMIN — LIDOCAINE HYDROCHLORIDE 60 MG: 20 INJECTION, SOLUTION INFILTRATION; PERINEURAL at 13:46

## 2021-06-28 RX ADMIN — PROPOFOL 140 MCG/KG/MIN: 10 INJECTION, EMULSION INTRAVENOUS at 13:46

## 2021-06-28 RX ADMIN — PROPOFOL 80 MG: 10 INJECTION, EMULSION INTRAVENOUS at 13:46

## 2021-06-28 RX ADMIN — SODIUM CHLORIDE, POTASSIUM CHLORIDE, SODIUM LACTATE AND CALCIUM CHLORIDE: 600; 310; 30; 20 INJECTION, SOLUTION INTRAVENOUS at 13:43

## 2021-06-28 ASSESSMENT — MIFFLIN-ST. JEOR: SCORE: 1219.89

## 2021-06-28 NOTE — ANESTHESIA CARE TRANSFER NOTE
Patient: Afia Medrano    Procedure(s):  ESOPHAGOGASTRODUODENOSCOPY, WITH BIOPSY    Diagnosis: Dysphagia [R13.10]  Diagnosis Additional Information: No value filed.    Anesthesia Type:   MAC     Note:    Oropharynx: oropharynx clear of all foreign objects  Level of Consciousness: drowsy  Oxygen Supplementation: nasal cannula  Level of Supplemental Oxygen (L/min / FiO2): 2  Independent Airway: airway patency satisfactory and stable  Dentition: dentition unchanged  Vital Signs Stable: post-procedure vital signs reviewed and stable  Report to RN Given: handoff report given  Patient transferred to: Phase II    Handoff Report: Identifed the Patient, Identified the Reponsible Provider, Reviewed the pertinent medical history, Discussed the surgical course, Reviewed Intra-OP anesthesia mangement and issues during anesthesia, Set expectations for post-procedure period and Allowed opportunity for questions and acknowledgement of understanding      Vitals: (Last set prior to Anesthesia Care Transfer)  CRNA VITALS  6/28/2021 1335 - 6/28/2021 1405      6/28/2021             Pulse:  66    Ht Rate:  66    SpO2:  95 %    Resp Rate (set):  10        Electronically Signed By: MIRIAM Chang CRNA  June 28, 2021  2:05 PM

## 2021-06-28 NOTE — ANESTHESIA PREPROCEDURE EVALUATION
Anesthesia Pre-Procedure Evaluation    Patient: Afia Medrano   MRN: [id number] : 1950        Preoperative Diagnosis: Dysphagia [R13.10]   Procedure : Procedure(s):  ESOPHAGOGASTRODUODENOSCOPY (EGD)     Past Medical History:   Diagnosis Date     Basal cell carcinoma of skin of nose     No Comments Provided     Hyperlipidemia     lowered zocor      Other specified disorders of bone density and structure, unspecified site (CODE)     2014,DEXA,  t score -1.7, ca/vit d     Recurrent major depressive disorder (H)     No Comments Provided     Sleep disorder     No Comments Provided      Past Surgical History:   Procedure Laterality Date     COLONOSCOPY      ,diverticulosis     COLONOSCOPY  2014    normal, repeat in      OTHER SURGICAL HISTORY      MIS494,PARTIAL HYSTERECTOMY,secondary to DUB. Ovaries in place. was on HRT, stopped patch in 2011      Allergies   Allergen Reactions     Seasonal Allergies Hives and Itching      Social History     Tobacco Use     Smoking status: Never Smoker     Smokeless tobacco: Never Used   Substance Use Topics     Alcohol use: No      Wt Readings from Last 1 Encounters:   21 72.6 kg (160 lb)        Anesthesia Evaluation   Pt has had prior anesthetic.         ROS/MED HX  ENT/Pulmonary:  - neg pulmonary ROS     Neurologic:  - neg neurologic ROS     Cardiovascular:       METS/Exercise Tolerance: >4 METS    Hematologic:  - neg hematologic  ROS     Musculoskeletal:  - neg musculoskeletal ROS     GI/Hepatic: Comment: Sigmoid Diverticulosis     (+) GERD,     Renal/Genitourinary:  - neg Renal ROS     Endo:  - neg endo ROS     Psychiatric/Substance Use:     (+) psychiatric history depression     Infectious Disease:  - neg infectious disease ROS     Malignancy:  - neg malignancy ROS     Other:  - neg other ROS          Physical Exam    Airway        Mallampati: III   TM distance: > 3 FB   Neck ROM: limited   Mouth opening: < 3 cm    Respiratory Devices  and Support         Dental  no notable dental history         Cardiovascular   cardiovascular exam normal       Rhythm and rate: regular and normal     Pulmonary   pulmonary exam normal        breath sounds clear to auscultation           OUTSIDE LABS:  CBC:   Lab Results   Component Value Date    WBC 5.0 06/15/2021    HGB 13.4 06/15/2021    HGB 15.4 01/16/2018    HCT 39.3 06/15/2021    HCT 44.4 01/16/2018     06/15/2021     01/16/2018     BMP:   Lab Results   Component Value Date     06/15/2021     07/21/2020    POTASSIUM 4.2 06/15/2021    POTASSIUM 4.3 07/21/2020    CHLORIDE 104 06/15/2021    CHLORIDE 104 07/21/2020    CO2 26 06/15/2021    CO2 31 07/21/2020    BUN 15 06/15/2021    BUN 17 07/21/2020    CR 0.92 06/15/2021    CR 0.97 07/21/2020    GLC 91 06/15/2021    GLC 96 07/21/2020     COAGS: No results found for: PTT, INR, FIBR  POC: No results found for: BGM, HCG, HCGS  HEPATIC:   Lab Results   Component Value Date    ALBUMIN 4.0 06/15/2021    PROTTOTAL 6.9 06/15/2021    ALT 14 06/15/2021    AST 18 06/15/2021    ALKPHOS 67 06/15/2021    BILITOTAL 0.5 06/15/2021     OTHER:   Lab Results   Component Value Date    ZOE 9.1 06/15/2021       Anesthesia Plan    ASA Status:  2   NPO Status:  NPO Appropriate    Anesthesia Type: MAC.     - Reason for MAC: straight local not clinically adequate              Consents    Anesthesia Plan(s) and associated risks, benefits, and realistic alternatives discussed. Questions answered and patient/representative(s) expressed understanding.     - Discussed with:  Patient         Postoperative Care            Comments:                David Kellerman, APRN CRNA

## 2021-06-28 NOTE — INTERVAL H&P NOTE
The history and physical has been reviewed and the patient has been examined.  There are no interim changes to the patient's history or physical condition.  EGD for dysphagia and worsening reflux. Risks of bleeding, perforation, aspiration discussed and wishes to proceed.   Cody Alarcon MD

## 2021-06-28 NOTE — ANESTHESIA POSTPROCEDURE EVALUATION
Patient: Afia Medrano    Procedure(s):  ESOPHAGOGASTRODUODENOSCOPY, WITH BIOPSY    Diagnosis:Dysphagia [R13.10]  Diagnosis Additional Information: No value filed.    Anesthesia Type:  MAC    Note:  Disposition: Outpatient   Postop Pain Control:            Sign Out: Well controlled pain   PONV: No   Neuro/Psych:             Sign Out: Acceptable/Baseline neuro status   Airway/Respiratory:             Sign Out: Acceptable/Baseline resp. status   CV/Hemodynamics:             Sign Out: Acceptable CV status   Other NRE: NONE   DID A NON-ROUTINE EVENT OCCUR? No           Last vitals:  Vitals:    06/28/21 1240 06/28/21 1256 06/28/21 1409   BP: (!) 150/102 (!) 159/104 (!) 135/95   Pulse: 71  66   Resp: 16     Temp: 98.2  F (36.8  C)  96.5  F (35.8  C)   SpO2: 95%  95%       Last vitals prior to Anesthesia Care Transfer:  CRNA VITALS  6/28/2021 1335 - 6/28/2021 1423      6/28/2021             Pulse:  66    Ht Rate:  66    SpO2:  95 %    Resp Rate (set):  10          Electronically Signed By: David Kellerman, APRN CRNA  June 28, 2021  2:23 PM

## 2021-06-28 NOTE — OP NOTE
Procedure note  Date of service: 6/28/2021    Preoperative diagnosis: Reflux  Dysphagia    Postoperative diagnosis: Gastritis  Sliding hiatal herrnia    Procedure:   EGD with biopsy      Anesthesia:   TYRESE Lino CRNA    Indication for the procedure:This is a 70 year old female with reflux and dysphagia.  After explaining the risks to include bleeding, aspiration, perforation, the patient wished to proceed.    Procedure:After adequate sedation, the patient was in the left lateral decubitus position.  The esophagoscope was passed under direct vision into the esophagus and onto the second portion of the duodenum.  The duodenum was unremarkable and biopsied.  The antrum was very erythematous and striped.  Biopsies were taken from the antrum to look for occult H. Pylori.  The scope was retroflexed.  The GE junction and fundus were with a few fundal polyps and a sliding hiatal hernia .  Scope was straightened and pulled back.  The distal esophagus was with a slightly irregular z-line and about a 5 cm long sliding hiatal hernia .  Biopsies x 8 were taken from the distal esophagus.  The remaining esophagus was unremarkable . The scope was removed.The patient tolerated the procedure well.    Recommendations:    Await pathology    Surgeon: Cody Alarcon MD FACS

## 2021-06-28 NOTE — DISCHARGE INSTRUCTIONS
[facility]  Adult Discharge Orders & Instructions    ________________________________________________________________          For 12 hours after surgery  1. Get plenty of rest.  A responsible adult must stay with you for at least 12 hours after you leave the hospital.   2. You may feel lightheaded.  IF so, sit for a few minutes before standing.  Have someone help you get up.   3. You may have a slight fever. Call the doctor if your fever is over 101 F (38.3 C) (taken under the tongue) or lasts longer than 24 hours.  4. You may have a dry mouth, a sore throat, muscle aches or trouble sleeping.  These should go away after 24 hours.  5. Do not make important or legal decisions.  6.   Do not drive or use heavy equipment.  If you have weakness or tingling, don't drive or use heavy equipment until this feeling goes away.    To contact a doctor, call   [phone]_______________________

## 2021-06-28 NOTE — OR NURSING
Pt tolerated muffin and water without nausea. Pt reading GERD and gastritis handouts given to her by Dr Alarcon. Discussed non irritating foods with this RN. Pt verbalized understanding and also of waiting for biopsy to come back. AVS reviewed in detail. Pt up to bathroom, steady on feet. Walked out to front entrance where family picked her up.

## 2021-07-12 DIAGNOSIS — K21.00 GASTROESOPHAGEAL REFLUX DISEASE WITH ESOPHAGITIS, UNSPECIFIED WHETHER HEMORRHAGE: ICD-10-CM

## 2021-07-13 NOTE — TELEPHONE ENCOUNTER
" Disp Refills Start End RUBEN   sucralfate (CARAFATE) 1 GM tablet 60 tablet 0 6/15/2021  --   Sig - Route: Take 1 tablet (1 g) by mouth 2 times daily - Oral         LOV: 6/15/2021  Future Office visit:  No future appointment scheduled at this time.     Routing refill request to provider for review/approval because:  Received limited refill. Unsure if patient needed to follow up with provided prior to future refills of medication.    Requested Prescriptions   Pending Prescriptions Disp Refills     sucralfate (CARAFATE) 1 GM tablet [Pharmacy Med Name: SUCRALFATE 1 GM TABLET] 60 tablet 0     Sig: TAKE 1 TABLET (1 G) BY MOUTH 2 TIMES DAILY       Miscellaneous Gastrointestinal Agents Passed - 7/12/2021 12:36 AM        Passed - Recent (12 mo) or future (30 days) visit within the authorizing provider's specialty     Patient has had an office visit with the authorizing provider or a provider within the authorizing providers department within the previous 12 mos or has a future within next 30 days. See \"Patient Info\" tab in inbasket, or \"Choose Columns\" in Meds & Orders section of the refill encounter.              Passed - Medication is active on med list        Passed - Patient is 18 years of age or older           Unable to complete prescription refill per RN Medication Refill Policy.................... Emma Ayala RN ....................  7/13/2021   1:52 PM        "

## 2021-07-15 RX ORDER — SUCRALFATE 1 G/1
1 TABLET ORAL 2 TIMES DAILY
Qty: 60 TABLET | Refills: 0 | Status: SHIPPED | OUTPATIENT
Start: 2021-07-15 | End: 2021-08-10

## 2021-08-08 DIAGNOSIS — K21.00 GASTROESOPHAGEAL REFLUX DISEASE WITH ESOPHAGITIS, UNSPECIFIED WHETHER HEMORRHAGE: ICD-10-CM

## 2021-08-10 RX ORDER — SUCRALFATE 1 G/1
1 TABLET ORAL 2 TIMES DAILY
Qty: 180 TABLET | Refills: 3 | Status: SHIPPED | OUTPATIENT
Start: 2021-08-10 | End: 2024-01-04

## 2021-08-10 NOTE — TELEPHONE ENCOUNTER
" Disp Refills Start End RUBEN   sucralfate (CARAFATE) 1 GM tablet 60 tablet 0 7/15/2021  No   Sig - Route: TAKE 1 TABLET (1 G) BY MOUTH 2 TIMES DAILY - Oral       LOV: 6/15/2021  Future Office visit: No future appointment scheduled at this time.     Routing to provider to review as patient was receiving limited refills.    Requested Prescriptions   Pending Prescriptions Disp Refills     sucralfate (CARAFATE) 1 GM tablet [Pharmacy Med Name: SUCRALFATE 1 GM TABLET] 60 tablet 0     Sig: TAKE 1 TABLET (1 G) BY MOUTH 2 TIMES DAILY       Miscellaneous Gastrointestinal Agents Passed - 8/8/2021  7:37 AM        Passed - Recent (12 mo) or future (30 days) visit within the authorizing provider's specialty     Patient has had an office visit with the authorizing provider or a provider within the authorizing providers department within the previous 12 mos or has a future within next 30 days. See \"Patient Info\" tab in inbasket, or \"Choose Columns\" in Meds & Orders section of the refill encounter.              Passed - Medication is active on med list        Passed - Patient is 18 years of age or older           Emma Ayala RN  ....................  8/10/2021   10:36 AM      "

## 2021-08-25 ENCOUNTER — ALLIED HEALTH/NURSE VISIT (OUTPATIENT)
Dept: FAMILY MEDICINE | Facility: OTHER | Age: 71
End: 2021-08-25
Attending: FAMILY MEDICINE
Payer: MEDICARE

## 2021-08-25 DIAGNOSIS — Z20.822 COVID-19 RULED OUT: Primary | ICD-10-CM

## 2021-08-25 LAB — SARS-COV-2 RNA RESP QL NAA+PROBE: NEGATIVE

## 2021-08-25 PROCEDURE — C9803 HOPD COVID-19 SPEC COLLECT: HCPCS

## 2021-08-25 PROCEDURE — U0005 INFEC AGEN DETEC AMPLI PROBE: HCPCS | Mod: ZL

## 2021-08-26 ENCOUNTER — OFFICE VISIT (OUTPATIENT)
Dept: FAMILY MEDICINE | Facility: OTHER | Age: 71
End: 2021-08-26
Attending: FAMILY MEDICINE
Payer: COMMERCIAL

## 2021-08-26 VITALS
RESPIRATION RATE: 16 BRPM | HEART RATE: 84 BPM | WEIGHT: 152 LBS | OXYGEN SATURATION: 95 % | BODY MASS INDEX: 26.93 KG/M2 | TEMPERATURE: 98.3 F | SYSTOLIC BLOOD PRESSURE: 108 MMHG | DIASTOLIC BLOOD PRESSURE: 70 MMHG

## 2021-08-26 DIAGNOSIS — R42 DIZZINESS: ICD-10-CM

## 2021-08-26 DIAGNOSIS — R63.4 WEIGHT LOSS: ICD-10-CM

## 2021-08-26 DIAGNOSIS — R14.0 POSTPRANDIAL ABDOMINAL BLOATING: Primary | ICD-10-CM

## 2021-08-26 LAB
ALBUMIN SERPL-MCNC: 4.3 G/DL (ref 3.5–5.7)
ALP SERPL-CCNC: 81 U/L (ref 34–104)
ALT SERPL W P-5'-P-CCNC: 9 U/L (ref 7–52)
AMYLASE SERPL-CCNC: 58 U/L (ref 29–103)
ANION GAP SERPL CALCULATED.3IONS-SCNC: 6 MMOL/L (ref 3–14)
AST SERPL W P-5'-P-CCNC: 15 U/L (ref 13–39)
BASOPHILS # BLD AUTO: 0 10E3/UL (ref 0–0.2)
BASOPHILS NFR BLD AUTO: 1 %
BILIRUB SERPL-MCNC: 1.1 MG/DL (ref 0.3–1)
BUN SERPL-MCNC: 12 MG/DL (ref 7–25)
CALCIUM SERPL-MCNC: 9.8 MG/DL (ref 8.6–10.3)
CHLORIDE BLD-SCNC: 105 MMOL/L (ref 98–107)
CO2 SERPL-SCNC: 31 MMOL/L (ref 21–31)
CREAT SERPL-MCNC: 0.94 MG/DL (ref 0.6–1.2)
EOSINOPHIL # BLD AUTO: 0.1 10E3/UL (ref 0–0.7)
EOSINOPHIL NFR BLD AUTO: 2 %
ERYTHROCYTE [DISTWIDTH] IN BLOOD BY AUTOMATED COUNT: 12.9 % (ref 10–15)
GFR SERPL CREATININE-BSD FRML MDRD: 62 ML/MIN/1.73M2
GLUCOSE BLD-MCNC: 91 MG/DL (ref 70–105)
HCT VFR BLD AUTO: 43.7 % (ref 35–47)
HGB BLD-MCNC: 14.5 G/DL (ref 11.7–15.7)
IMM GRANULOCYTES # BLD: 0 10E3/UL
IMM GRANULOCYTES NFR BLD: 0 %
LYMPHOCYTES # BLD AUTO: 1.1 10E3/UL (ref 0.8–5.3)
LYMPHOCYTES NFR BLD AUTO: 23 %
MCH RBC QN AUTO: 29.4 PG (ref 26.5–33)
MCHC RBC AUTO-ENTMCNC: 33.2 G/DL (ref 31.5–36.5)
MCV RBC AUTO: 89 FL (ref 78–100)
MONOCYTES # BLD AUTO: 0.4 10E3/UL (ref 0–1.3)
MONOCYTES NFR BLD AUTO: 10 %
NEUTROPHILS # BLD AUTO: 3 10E3/UL (ref 1.6–8.3)
NEUTROPHILS NFR BLD AUTO: 64 %
NRBC # BLD AUTO: 0 10E3/UL
NRBC BLD AUTO-RTO: 0 /100
PLATELET # BLD AUTO: 191 10E3/UL (ref 150–450)
POTASSIUM BLD-SCNC: 4 MMOL/L (ref 3.5–5.1)
PROT SERPL-MCNC: 6.9 G/DL (ref 6.4–8.9)
RBC # BLD AUTO: 4.94 10E6/UL (ref 3.8–5.2)
SODIUM SERPL-SCNC: 142 MMOL/L (ref 134–144)
WBC # BLD AUTO: 4.6 10E3/UL (ref 4–11)

## 2021-08-26 PROCEDURE — G0463 HOSPITAL OUTPT CLINIC VISIT: HCPCS

## 2021-08-26 PROCEDURE — 36415 COLL VENOUS BLD VENIPUNCTURE: CPT | Mod: ZL | Performed by: FAMILY MEDICINE

## 2021-08-26 PROCEDURE — 85025 COMPLETE CBC W/AUTO DIFF WBC: CPT | Mod: ZL | Performed by: FAMILY MEDICINE

## 2021-08-26 PROCEDURE — 82040 ASSAY OF SERUM ALBUMIN: CPT | Mod: ZL | Performed by: FAMILY MEDICINE

## 2021-08-26 PROCEDURE — 82150 ASSAY OF AMYLASE: CPT | Mod: ZL | Performed by: FAMILY MEDICINE

## 2021-08-26 PROCEDURE — 99214 OFFICE O/P EST MOD 30 MIN: CPT | Performed by: FAMILY MEDICINE

## 2021-08-26 RX ORDER — ASPIRIN 81 MG/1
81 TABLET ORAL DAILY
COMMUNITY
End: 2024-01-04

## 2021-08-26 ASSESSMENT — PAIN SCALES - GENERAL: PAINLEVEL: NO PAIN (0)

## 2021-08-26 ASSESSMENT — ANXIETY QUESTIONNAIRES
6. BECOMING EASILY ANNOYED OR IRRITABLE: NOT AT ALL
GAD7 TOTAL SCORE: 3
3. WORRYING TOO MUCH ABOUT DIFFERENT THINGS: SEVERAL DAYS
IF YOU CHECKED OFF ANY PROBLEMS ON THIS QUESTIONNAIRE, HOW DIFFICULT HAVE THESE PROBLEMS MADE IT FOR YOU TO DO YOUR WORK, TAKE CARE OF THINGS AT HOME, OR GET ALONG WITH OTHER PEOPLE: SOMEWHAT DIFFICULT
5. BEING SO RESTLESS THAT IT IS HARD TO SIT STILL: NOT AT ALL
1. FEELING NERVOUS, ANXIOUS, OR ON EDGE: SEVERAL DAYS
7. FEELING AFRAID AS IF SOMETHING AWFUL MIGHT HAPPEN: NOT AT ALL
2. NOT BEING ABLE TO STOP OR CONTROL WORRYING: NOT AT ALL

## 2021-08-26 ASSESSMENT — PATIENT HEALTH QUESTIONNAIRE - PHQ9
5. POOR APPETITE OR OVEREATING: SEVERAL DAYS
SUM OF ALL RESPONSES TO PHQ QUESTIONS 1-9: 4

## 2021-08-26 NOTE — PROGRESS NOTES
[facility]    SUBJECTIVE:   Afia Medrano is a 70 year old female who presents to clinic today for the following health issues:  Nursing Notes:   Estefanía Hastings LPN  8/26/2021 10:45 AM  Sign at exiting of workspace  Patient is here for on going gastritis issues, states feels sick before she eats, after eating, burping, bloated. States has been feeling dizzy. Would like to have her arm looked at for skin issues.     No LMP recorded (lmp unknown). Patient has had a hysterectomy.  Medication Reconciliation: complete    Estefanía Hastings LPN  8/26/2021 10:45 AM    FOOD SECURITY SCREENING QUESTIONS  Hunger Vital Signs:  Within the past 12 months we worried whether our food would run out before we got money to buy more. Never  Within the past 12 months the food we bought just didn't last and we didn't have money to get more. Never  Estefanía Hastings LPN 8/26/2021 10:45 AM        HPI     71 yo female presents with ongoing GI issues and new concern of dizziness.    Abdominal bloating, post-prandial, initially started last Spring but improved for a few months.   6/2021 EGD gastritis and hiatal hernia  Prilosec 20 mg BID carafate with meals  She has lost some weight.  She feels it is due to decreased p.o. intake as it makes her symptoms worse.  Stools have been normal.  Denies mucus.    Also experiencing dizzy episodes, sporadic.  Do not seem to be positional.  Not vertiginous.  No associated nausea vomiting or tinnitus.  Blood pressure initially was low at 108/70.    Patient Active Problem List    Diagnosis Date Noted     Gastritis 06/28/2021     Priority: Medium     Sliding hiatal hernia 06/28/2021     Priority: Medium     Other dysphagia 06/22/2021     Priority: Medium     History of basal cell carcinoma 07/21/2020     Priority: Medium     MDD (major depressive disorder) 01/31/2018     Priority: Medium     Sleep disorder 08/01/2007     Priority: Medium     Sigmoid diverticulosis 02/24/2003     Priority: Medium     Past  Medical History:   Diagnosis Date     Basal cell carcinoma of skin of nose     No Comments Provided     Hyperlipidemia     lowered zocor 2015     Other specified disorders of bone density and structure, unspecified site (CODE)     11/2014,DEXA,  t score -1.7, ca/vit d     Recurrent major depressive disorder (H)     No Comments Provided     Sleep disorder     No Comments Provided      Current Outpatient Medications   Medication Sig Dispense Refill     fluticasone (FLONASE) 50 MCG/ACT nasal spray SPRAY 1 SPRAY INTO BOTH NOSTRILS AT BEDTIME 16 mL 3     metroNIDAZOLE (METROGEL) 0.75 % external gel Apply topically 2 times daily 45 g 11     omeprazole (PRILOSEC) 20 MG DR capsule Take 1 capsule (20 mg) by mouth 2 times daily Before a meal 180 capsule 3     sucralfate (CARAFATE) 1 GM tablet TAKE 1 TABLET (1 G) BY MOUTH 2 TIMES DAILY 180 tablet 3     triamcinolone (KENALOG) 0.1 % cream        zolpidem (AMBIEN) 10 MG tablet TAKE ONE TABLET BY MOUTH AT BEDTIME IF NEEDED FOR SLEEP 90 tablet 1     aspirin 81 MG EC tablet Take 81 mg by mouth daily (Patient not taking: Reported on 8/26/2021)       Calcium Carb-Cholecalciferol 600-800 MG-UNIT CHEW Take 1 tablet by mouth daily (Patient not taking: Reported on 8/26/2021)       cholecalciferol (VITAMIN D-1000 MAX ST) 1000 UNITS TABS Take 1,000 Units by mouth daily (Patient not taking: Reported on 8/26/2021)       citalopram (CELEXA) 40 MG tablet TAKE 1 TABLET BY MOUTH AT BEDTIME (Patient not taking: Reported on 8/26/2021) 90 tablet 3     GLUCOSAMINE SULFATE PO Take 1 tablet by mouth daily (Patient not taking: Reported on 8/26/2021)       simvastatin (ZOCOR) 10 MG tablet TAKE 1 TABLET BY MOUTH EVERYDAY AT BEDTIME (Patient not taking: Reported on 8/26/2021) 90 tablet 1     Allergies   Allergen Reactions     Seasonal Allergies Hives and Itching       Review of Systems   Constitutional: Positive for fatigue and unexpected weight change.   Gastrointestinal: Positive for abdominal  distention, heartburn and nausea. Negative for anal bleeding, constipation, hematochezia, rectal pain and vomiting.   Neurological: Positive for dizziness. Negative for tremors, weakness, light-headedness, numbness and paresthesias.        OBJECTIVE:     /70   Pulse 84   Temp 98.3  F (36.8  C) (Tympanic)   Resp 16   Wt 68.9 kg (152 lb)   LMP  (LMP Unknown)   SpO2 95%   Breastfeeding No   BMI 26.93 kg/m    Body mass index is 26.93 kg/m .  Physical Exam  Cardiovascular:      Rate and Rhythm: Normal rate.      Heart sounds: No murmur heard.   No gallop.    Pulmonary:      Effort: Pulmonary effort is normal.   Abdominal:      General: Abdomen is flat. Bowel sounds are normal. There is no distension.      Tenderness: There is no abdominal tenderness.   Musculoskeletal:      Cervical back: Normal range of motion. No rigidity.   Lymphadenopathy:      Cervical: No cervical adenopathy.   Neurological:      General: No focal deficit present.      Mental Status: She is alert.      Coordination: Coordination normal.   Psychiatric:         Mood and Affect: Mood normal.         Diagnostic Test Results:  Results for orders placed or performed in visit on 08/26/21   Amylase     Status: Normal   Result Value Ref Range    Amylase 58 29 - 103 U/L   Comprehensive Metabolic Panel     Status: Abnormal   Result Value Ref Range    Sodium 142 134 - 144 mmol/L    Potassium 4.0 3.5 - 5.1 mmol/L    Chloride 105 98 - 107 mmol/L    Carbon Dioxide (CO2) 31 21 - 31 mmol/L    Anion Gap 6 3 - 14 mmol/L    Urea Nitrogen 12 7 - 25 mg/dL    Creatinine 0.94 0.60 - 1.20 mg/dL    Calcium 9.8 8.6 - 10.3 mg/dL    Glucose 91 70 - 105 mg/dL    Alkaline Phosphatase 81 34 - 104 U/L    AST 15 13 - 39 U/L    ALT 9 7 - 52 U/L    Protein Total 6.9 6.4 - 8.9 g/dL    Albumin 4.3 3.5 - 5.7 g/dL    Bilirubin Total 1.1 (H) 0.3 - 1.0 mg/dL    GFR Estimate 62 >60 mL/min/1.73m2   CBC and Differential     Status: None    Narrative    The following orders were  created for panel order CBC and Differential.  Procedure                               Abnormality         Status                     ---------                               -----------         ------                     CBC with platelets and d...[[id number]]                      Final result                 Please view results for these tests on the individual orders.   CBC with platelets and differential     Status: None   Result Value Ref Range    WBC Count 4.6 4.0 - 11.0 10e3/uL    RBC Count 4.94 3.80 - 5.20 10e6/uL    Hemoglobin 14.5 11.7 - 15.7 g/dL    Hematocrit 43.7 35.0 - 47.0 %    MCV 89 78 - 100 fL    MCH 29.4 26.5 - 33.0 pg    MCHC 33.2 31.5 - 36.5 g/dL    RDW 12.9 10.0 - 15.0 %    Platelet Count 191 150 - 450 10e3/uL    % Neutrophils 64 %    % Lymphocytes 23 %    % Monocytes 10 %    % Eosinophils 2 %    % Basophils 1 %    % Immature Granulocytes 0 %    NRBCs per 100 WBC 0 <1 /100    Absolute Neutrophils 3.0 1.6 - 8.3 10e3/uL    Absolute Lymphocytes 1.1 0.8 - 5.3 10e3/uL    Absolute Monocytes 0.4 0.0 - 1.3 10e3/uL    Absolute Eosinophils 0.1 0.0 - 0.7 10e3/uL    Absolute Basophils 0.0 0.0 - 0.2 10e3/uL    Absolute Immature Granulocytes 0.0 <=0.0 10e3/uL    Absolute NRBCs 0.0 10e3/uL       ASSESSMENT/PLAN:         1. Postprandial abdominal bloating  May be related to recurrent gastritis.  Symptoms consistent with gallbladder dysfunction.  Discussed role of HIDA scan.  Given her weight loss I am concerned about other underlying pathology and recommend CT scan with contrast of the abdomen and pelvis for further evaluation.  Labs are reassuring.  She will continue on Prilosec and Carafate for now.  - NM Hepatobiliary Scan w GB EF; Future  - CBC and Differential; Future  - Comprehensive Metabolic Panel; Future  - Amylase; Future  - Amylase  - Comprehensive Metabolic Panel  - CBC and Differential  - CT Abdomen Pelvis w Contrast; Future    2. Weight loss    - CT Abdomen Pelvis w Contrast; Future    3.  Dizziness  Unclear etiology.  Initial blood pressure was low but orthostatics were normal.  Does not seem positional and no associated neurological symptoms.  Certainly could be inner ear labyrinthitis.  We will continue to monitor no treatment was recommended today.      There are no Patient Instructions on file for this visit.      Joan Velazquez MD  [facility]

## 2021-08-26 NOTE — NURSING NOTE
Patient is here for on going gastritis issues, states feels sick before she eats, after eating, burping, bloated. States has been feeling dizzy. Would like to have her arm looked at for skin issues.     No LMP recorded (lmp unknown). Patient has had a hysterectomy.  Medication Reconciliation: complete    Estefanía Hastings LPN  8/26/2021 10:45 AM    FOOD SECURITY SCREENING QUESTIONS  Hunger Vital Signs:  Within the past 12 months we worried whether our food would run out before we got money to buy more. Never  Within the past 12 months the food we bought just didn't last and we didn't have money to get more. Never  Estefanía Hastings LPN 8/26/2021 10:45 AM

## 2021-08-27 ASSESSMENT — ANXIETY QUESTIONNAIRES: GAD7 TOTAL SCORE: 3

## 2021-08-31 ENCOUNTER — HOSPITAL ENCOUNTER (OUTPATIENT)
Dept: CT IMAGING | Facility: OTHER | Age: 71
Discharge: HOME | End: 2021-08-31
Attending: FAMILY MEDICINE | Admitting: FAMILY MEDICINE
Payer: MEDICARE

## 2021-08-31 DIAGNOSIS — R63.4 WEIGHT LOSS: ICD-10-CM

## 2021-08-31 DIAGNOSIS — R14.0 POSTPRANDIAL ABDOMINAL BLOATING: ICD-10-CM

## 2021-08-31 PROCEDURE — 250N000011 HC RX IP 250 OP 636: Performed by: FAMILY MEDICINE

## 2021-08-31 PROCEDURE — 74177 CT ABD & PELVIS W/CONTRAST: CPT

## 2021-08-31 RX ORDER — IOPAMIDOL 755 MG/ML
88 INJECTION, SOLUTION INTRAVASCULAR ONCE
Status: COMPLETED | OUTPATIENT
Start: 2021-08-31 | End: 2021-08-31

## 2021-08-31 RX ADMIN — IOPAMIDOL 88 ML: 755 INJECTION, SOLUTION INTRAVENOUS at 11:45

## 2021-08-31 ASSESSMENT — ENCOUNTER SYMPTOMS
NUMBNESS: 0
NAUSEA: 1
DIZZINESS: 1
UNEXPECTED WEIGHT CHANGE: 1
VOMITING: 0
HEARTBURN: 1
PARESTHESIAS: 0
WEAKNESS: 0
TREMORS: 0
ANAL BLEEDING: 0
CONSTIPATION: 0
RECTAL PAIN: 0
LIGHT-HEADEDNESS: 0
HEMATOCHEZIA: 0
FATIGUE: 1
ABDOMINAL DISTENTION: 1

## 2021-08-31 NOTE — PROGRESS NOTES

## 2021-09-03 ENCOUNTER — HOSPITAL ENCOUNTER (OUTPATIENT)
Dept: NUCLEAR MEDICINE | Facility: OTHER | Age: 71
Setting detail: NUCLEAR MEDICINE
Discharge: HOME | End: 2021-09-03
Attending: FAMILY MEDICINE | Admitting: FAMILY MEDICINE
Payer: MEDICARE

## 2021-09-03 DIAGNOSIS — R14.0 POSTPRANDIAL ABDOMINAL BLOATING: ICD-10-CM

## 2021-09-03 PROCEDURE — 343N000001 HC RX 343: Performed by: FAMILY MEDICINE

## 2021-09-03 PROCEDURE — A9537 TC99M MEBROFENIN: HCPCS | Performed by: FAMILY MEDICINE

## 2021-09-03 PROCEDURE — 78227 HEPATOBIL SYST IMAGE W/DRUG: CPT

## 2021-09-03 RX ORDER — KIT FOR THE PREPARATION OF TECHNETIUM TC 99M MEBROFENIN 45 MG/10ML
4.98 INJECTION, POWDER, LYOPHILIZED, FOR SOLUTION INTRAVENOUS ONCE
Status: COMPLETED | OUTPATIENT
Start: 2021-09-03 | End: 2021-09-03

## 2021-09-03 RX ADMIN — MEBROFENIN 4.98 MILLICURIE: 45 INJECTION, POWDER, LYOPHILIZED, FOR SOLUTION INTRAVENOUS at 10:45

## 2021-09-07 ENCOUNTER — MYC MEDICAL ADVICE (OUTPATIENT)
Dept: FAMILY MEDICINE | Facility: OTHER | Age: 71
End: 2021-09-07

## 2021-09-07 DIAGNOSIS — N94.9 ADNEXAL CYST: ICD-10-CM

## 2021-09-07 DIAGNOSIS — R14.0 POSTPRANDIAL ABDOMINAL BLOATING: Primary | ICD-10-CM

## 2021-09-08 NOTE — TELEPHONE ENCOUNTER
Result note recommend ultra sound. Needing order.   Estefanía Hastings LPN .............9/8/2021     11:53 AM      You do have a 6 cm simple appearing cyst in the left lower pelvis.  This is not really in the area of your discomfort for but does need further evaluation.  I placed an order for pelvic ultrasound.  I suspect this will be a benign cyst but strongly recommend the ultrasound is ordered.     Joan Velazquez MD

## 2021-09-10 ENCOUNTER — HOSPITAL ENCOUNTER (OUTPATIENT)
Dept: ULTRASOUND IMAGING | Facility: OTHER | Age: 71
Discharge: HOME | End: 2021-09-10
Attending: FAMILY MEDICINE | Admitting: FAMILY MEDICINE
Payer: MEDICARE

## 2021-09-10 DIAGNOSIS — N94.9 ADNEXAL CYST: ICD-10-CM

## 2021-09-10 PROCEDURE — 76856 US EXAM PELVIC COMPLETE: CPT

## 2021-09-16 ENCOUNTER — MYC MEDICAL ADVICE (OUTPATIENT)
Dept: FAMILY MEDICINE | Facility: OTHER | Age: 71
End: 2021-09-16

## 2021-09-16 DIAGNOSIS — N83.202 CYST OF LEFT OVARY: Primary | ICD-10-CM

## 2021-10-09 ENCOUNTER — HEALTH MAINTENANCE LETTER (OUTPATIENT)
Age: 71
End: 2021-10-09

## 2021-10-12 DIAGNOSIS — L71.9 ROSACEA: ICD-10-CM

## 2021-10-12 RX ORDER — METRONIDAZOLE 7.5 MG/G
GEL TOPICAL
Qty: 45 G | Refills: 11 | Status: SHIPPED | OUTPATIENT
Start: 2021-10-12 | End: 2022-11-01

## 2021-10-15 ENCOUNTER — IMMUNIZATION (OUTPATIENT)
Dept: FAMILY MEDICINE | Facility: OTHER | Age: 71
End: 2021-10-15
Attending: FAMILY MEDICINE
Payer: MEDICARE

## 2021-10-15 ENCOUNTER — OFFICE VISIT (OUTPATIENT)
Dept: OBGYN | Facility: OTHER | Age: 71
End: 2021-10-15
Attending: FAMILY MEDICINE
Payer: MEDICARE

## 2021-10-15 VITALS
BODY MASS INDEX: 26.75 KG/M2 | SYSTOLIC BLOOD PRESSURE: 132 MMHG | HEART RATE: 64 BPM | DIASTOLIC BLOOD PRESSURE: 84 MMHG | WEIGHT: 151 LBS

## 2021-10-15 DIAGNOSIS — Z23 NEED FOR PROPHYLACTIC VACCINATION AND INOCULATION AGAINST INFLUENZA: Primary | ICD-10-CM

## 2021-10-15 DIAGNOSIS — N83.202 CYST OF LEFT OVARY: Primary | ICD-10-CM

## 2021-10-15 LAB — CANCER AG125 SERPL-ACNC: 12 U/ML (ref 0–35)

## 2021-10-15 PROCEDURE — 99203 OFFICE O/P NEW LOW 30 MIN: CPT | Performed by: OBSTETRICS & GYNECOLOGY

## 2021-10-15 PROCEDURE — G0463 HOSPITAL OUTPT CLINIC VISIT: HCPCS | Mod: 25

## 2021-10-15 PROCEDURE — 86304 IMMUNOASSAY TUMOR CA 125: CPT | Mod: ZL,GZ | Performed by: OBSTETRICS & GYNECOLOGY

## 2021-10-15 PROCEDURE — 90662 IIV NO PRSV INCREASED AG IM: CPT

## 2021-10-15 PROCEDURE — G0008 ADMIN INFLUENZA VIRUS VAC: HCPCS

## 2021-10-15 PROCEDURE — 36415 COLL VENOUS BLD VENIPUNCTURE: CPT | Mod: ZL | Performed by: OBSTETRICS & GYNECOLOGY

## 2021-10-15 PROCEDURE — 91300 PR COVID VAC PFIZER DIL RECON 30 MCG/0.3 ML IM: CPT

## 2021-10-15 PROCEDURE — G0463 HOSPITAL OUTPT CLINIC VISIT: HCPCS

## 2021-10-15 ASSESSMENT — PAIN SCALES - GENERAL: PAINLEVEL: NO PAIN (0)

## 2021-10-15 NOTE — PROGRESS NOTES
Gynecology Visit    CC: ovarian cyst    HPI:    Afia Medrano is a 70 year old , here for the above concern. She reports at least a year of GERD symptoms, however, she started having worsening symptoms and the addition of abdominal bloating starting 2021. No pelvic pain. She had a hysterectomy in her 40s for heavy menses and denies any bleeding since. She reports increased urinary frequency but no dysuria. She has long-standing issues with constipation, no change. She presented to her PCP, who did a CT scan that demonstrated a 6.3cm simple appearing left ovarian cyst without suspicious features. No lymphadenopathy, ascites or omental thickening. Follow up pelvic ultrasound demonstrated a 6.7 cm left adnexal simple appearing cyst without vascularity, solid component or septations.     OBHx  OB History    Para Term  AB Living   2 2 0 0 0 0   SAB TAB Ectopic Multiple Live Births   0 0 0 0 0       PMHx:   Past Medical History:   Diagnosis Date     Basal cell carcinoma of skin of nose     No Comments Provided     Hyperlipidemia     lowered zocor      Other specified disorders of bone density and structure, unspecified site (CODE)     2014,DEXA,  t score -1.7, ca/vit d     Recurrent major depressive disorder (H)     No Comments Provided     Sleep disorder     No Comments Provided      PSHx:   Past Surgical History:   Procedure Laterality Date     COLONOSCOPY      ,diverticulosis     COLONOSCOPY  2014    normal, repeat in      ESOPHAGOSCOPY, GASTROSCOPY, DUODENOSCOPY (EGD), COMBINED N/A 2021    Procedure: ESOPHAGOGASTRODUODENOSCOPY, WITH BIOPSY;  Surgeon: Cody Alarcon MD;  Location:  OR     OTHER SURGICAL HISTORY      [id number],PARTIAL HYSTERECTOMY,secondary to DUB. Ovaries in place. was on HRT, stopped patch in 2011      Meds:   Current Outpatient Medications   Medication     citalopram (CELEXA) 40 MG tablet     metroNIDAZOLE (METROGEL) 0.75 % external gel      omeprazole (PRILOSEC) 20 MG DR capsule     triamcinolone (KENALOG) 0.1 % cream     zolpidem (AMBIEN) 10 MG tablet     aspirin 81 MG EC tablet     Calcium Carb-Cholecalciferol 600-800 MG-UNIT CHEW     cholecalciferol (VITAMIN D-1000 MAX ST) 1000 UNITS TABS     fluticasone (FLONASE) 50 MCG/ACT nasal spray     GLUCOSAMINE SULFATE PO     simvastatin (ZOCOR) 10 MG tablet     sucralfate (CARAFATE) 1 GM tablet     No current facility-administered medications for this visit.       Allergies:       Allergies   Allergen Reactions     Seasonal Allergies Hives and Itching       SocHx:   Social History     Tobacco Use     Smoking status: Former Smoker     Packs/day: 1.00     Years: 20.00     Pack years: 20.00     Types: Cigarettes     Quit date:      Years since quittin.8     Smokeless tobacco: Never Used   Vaping Use     Vaping Use: Never used   Substance Use Topics     Alcohol use: No     Drug use: No      in 2021. Enjoys gardening, quilting.    FamHx:   Family History   Problem Relation Age of Onset     Diabetes Father         Diabetes     Hypertension Father         Hypertension     Heart Disease Father         Heart Disease     Osteoporosis Mother         Osteoporosis,compression fracture     Heart Disease Mother         Heart Disease     Other - See Comments Other         negative breast cancer     Breast Cancer No family hx of         Cancer-breast    No hx of breast, colon, ovarian, or uterine cancers    Physical Exam  /84 (BP Location: Right arm, Patient Position: Sitting, Cuff Size: Adult Large)   Pulse 64   Wt 68.5 kg (151 lb)   LMP  (LMP Unknown)   Breastfeeding No   BMI 26.75 kg/m    Gen: Well-appearing, NAD  Resp: nonlabored  Psych: appropriate mood and affect    Pelvic:  Normal appearing external female genitalia. Normal hair distribution. Vagina is without lesions. Moderate yellow discharge, no odor. Cuff intact. Cervix and uterus surgically absent. Slight fullness of left  adnexa, no nodularity, nontender. Normal right adnexa.     CT A/P 21  FINDINGS:    Limited images through the lung bases demonstrate minimal atelectasis.  There is a small sliding hiatal hernia. There is trace retained or  refluxed contrast in the thoracic esophagus seen at the top of the  images.  The liver demonstrates no mass or intrahepatic biliary ductal  dilatation.  The gallbladder, spleen, pancreas and adrenal glands are  unremarkable.  Symmetric nephrograms are present without  hydronephrosis. There is no abdominal aortic aneurysm.  The bowel is normal in caliber. There is a cyst associated with the  left adnexa measuring 6.3 x 4.0 cm without suspicious enhancement.  No free fluid, free air or adenopathy is present.  No suspicious  osseous lesions are identified.                                                                   IMPRESSION:    Small sliding hiatal hernia. Trace probable refluxed or retained oral  contrast.  6.3 cm simple appearing left adnexal cystic lesion. Consider pelvic  ultrasound for further assessment. Recommend gynecologic consultation.    Pelvic US 9/10/21:  FINDINGS: Uterus has been removed. Right ovary is not visualized.  There is a 6.7 x 4.8 x 6.2 cm anechoic lesion in the left adnexa, most  likely a left ovarian cyst. No solid components or vascularity is  seen. This is atypical in a patient of this age.  No free fluid is seen.                                                                   IMPRESSION: Cystic mass in the left adnexa, most likely a left ovarian  cyst. This is unusual in a patient of this age and although no solid  components or vascularity is seen this will require follow-up.       Assessment/Plan  Afia Medrano is a 70 year old  female here for a simple appearing left ovarian cyst. Low suspicion for malignancy based on appearance but recommend CA-125 today with repeat US in 4 weeks. Discussed indications for surgical intervention. If it remains  stable in size with normal CA-125, can continue with expectant management since it is less than 10 cm and asymptomatic.     Total time spent 30 minutes     Marie Siddiqui MD  OB/GYN  10/15/2021 10:05 AM

## 2021-10-15 NOTE — ADDENDUM NOTE
Addended by: MYRTLE CAMARGO on: 10/15/2021 09:32 AM     Modules accepted: Orders, Level of Service, SmartSet    
Statement Selected

## 2021-10-15 NOTE — NURSING NOTE
Chief Complaint   Patient presents with     Consult     Cyst Left Ovary        Medication Reconciliation: completed   Pretty Pittman LPN  10/15/2021 9:54 AM

## 2021-10-15 NOTE — LETTER
10/15/2021        RE: Afia Medrano  [address]  [facility] [address]        Gynecology Visit    CC: ovarian cyst    HPI:    Afia Medrano is a 70 year old , here for the above concern. She reports at least a year of GERD symptoms, however, she started having worsening symptoms and the addition of abdominal bloating starting 2021. No pelvic pain. She had a hysterectomy in her 40s for heavy menses and denies any bleeding since. She reports increased urinary frequency but no dysuria. She has long-standing issues with constipation, no change. She presented to her PCP, who did a CT scan that demonstrated a 6.3cm simple appearing left ovarian cyst without suspicious features. No lymphadenopathy, ascites or omental thickening. Follow up pelvic ultrasound demonstrated a 6.7 cm left adnexal simple appearing cyst without vascularity, solid component or septations.     OBHx  OB History    Para Term  AB Living   2 2 0 0 0 0   SAB TAB Ectopic Multiple Live Births   0 0 0 0 0       PMHx:   Past Medical History:   Diagnosis Date     Basal cell carcinoma of skin of nose     No Comments Provided     Hyperlipidemia     lowered zocor 2015     Other specified disorders of bone density and structure, unspecified site (CODE)     2014,DEXA,  t score -1.7, ca/vit d     Recurrent major depressive disorder (H)     No Comments Provided     Sleep disorder     No Comments Provided      PSHx:   Past Surgical History:   Procedure Laterality Date     COLONOSCOPY      ,diverticulosis     COLONOSCOPY  2014    normal, repeat in      ESOPHAGOSCOPY, GASTROSCOPY, DUODENOSCOPY (EGD), COMBINED N/A 2021    Procedure: ESOPHAGOGASTRODUODENOSCOPY, WITH BIOPSY;  Surgeon: Cody Alarcon MD;  Location:  OR     OTHER SURGICAL HISTORY      VIA067,PARTIAL HYSTERECTOMY,secondary to DUB. Ovaries in place. was on HRT, stopped patch in 2011      Meds:   Current Outpatient Medications   Medication      citalopram (CELEXA) 40 MG tablet     metroNIDAZOLE (METROGEL) 0.75 % external gel     omeprazole (PRILOSEC) 20 MG DR capsule     triamcinolone (KENALOG) 0.1 % cream     zolpidem (AMBIEN) 10 MG tablet     aspirin 81 MG EC tablet     Calcium Carb-Cholecalciferol 600-800 MG-UNIT CHEW     cholecalciferol (VITAMIN D-1000 MAX ST) 1000 UNITS TABS     fluticasone (FLONASE) 50 MCG/ACT nasal spray     GLUCOSAMINE SULFATE PO     simvastatin (ZOCOR) 10 MG tablet     sucralfate (CARAFATE) 1 GM tablet     No current facility-administered medications for this visit.       Allergies:       Allergies   Allergen Reactions     Seasonal Allergies Hives and Itching       SocHx:   Social History     Tobacco Use     Smoking status: Former Smoker     Packs/day: 1.00     Years: 20.00     Pack years: 20.00     Types: Cigarettes     Quit date:      Years since quittin.8     Smokeless tobacco: Never Used   Vaping Use     Vaping Use: Never used   Substance Use Topics     Alcohol use: No     Drug use: No      in 2021. Enjoys gardening, quilting.    FamHx:   Family History   Problem Relation Age of Onset     Diabetes Father         Diabetes     Hypertension Father         Hypertension     Heart Disease Father         Heart Disease     Osteoporosis Mother         Osteoporosis,compression fracture     Heart Disease Mother         Heart Disease     Other - See Comments Other         negative breast cancer     Breast Cancer No family hx of         Cancer-breast    No hx of breast, colon, ovarian, or uterine cancers    Physical Exam  /84 (BP Location: Right arm, Patient Position: Sitting, Cuff Size: Adult Large)   Pulse 64   Wt 68.5 kg (151 lb)   LMP  (LMP Unknown)   Breastfeeding No   BMI 26.75 kg/m    Gen: Well-appearing, NAD  Resp: nonlabored  Psych: appropriate mood and affect    Pelvic:  Normal appearing external female genitalia. Normal hair distribution. Vagina is without lesions. Moderate yellow discharge,  no odor. Cuff intact. Cervix and uterus surgically absent. Slight fullness of left adnexa, no nodularity, nontender. Normal right adnexa.     CT A/P 21  FINDINGS:    Limited images through the lung bases demonstrate minimal atelectasis.  There is a small sliding hiatal hernia. There is trace retained or  refluxed contrast in the thoracic esophagus seen at the top of the  images.  The liver demonstrates no mass or intrahepatic biliary ductal  dilatation.  The gallbladder, spleen, pancreas and adrenal glands are  unremarkable.  Symmetric nephrograms are present without  hydronephrosis. There is no abdominal aortic aneurysm.  The bowel is normal in caliber. There is a cyst associated with the  left adnexa measuring 6.3 x 4.0 cm without suspicious enhancement.  No free fluid, free air or adenopathy is present.  No suspicious  osseous lesions are identified.                                                                   IMPRESSION:    Small sliding hiatal hernia. Trace probable refluxed or retained oral  contrast.  6.3 cm simple appearing left adnexal cystic lesion. Consider pelvic  ultrasound for further assessment. Recommend gynecologic consultation.    Pelvic US 9/10/21:  FINDINGS: Uterus has been removed. Right ovary is not visualized.  There is a 6.7 x 4.8 x 6.2 cm anechoic lesion in the left adnexa, most  likely a left ovarian cyst. No solid components or vascularity is  seen. This is atypical in a patient of this age.  No free fluid is seen.                                                                   IMPRESSION: Cystic mass in the left adnexa, most likely a left ovarian  cyst. This is unusual in a patient of this age and although no solid  components or vascularity is seen this will require follow-up.       Assessment/Plan  Afia Medrano is a 70 year old  female here for a simple appearing left ovarian cyst. Low suspicion for malignancy based on appearance but recommend CA-125 today with  repeat US in 4 weeks. Discussed indications for surgical intervention. If it remains stable in size with normal CA-125, can continue with expectant management since it is less than 10 cm and asymptomatic.     Total time spent 30 minutes     Marie Siddiqui MD  OB/GYN  10/15/2021 10:05 AM           Sincerely,        Marie Siddiqui MD

## 2021-11-19 ENCOUNTER — OFFICE VISIT (OUTPATIENT)
Dept: OBGYN | Facility: OTHER | Age: 71
End: 2021-11-19
Attending: OBSTETRICS & GYNECOLOGY
Payer: MEDICARE

## 2021-11-19 ENCOUNTER — HOSPITAL ENCOUNTER (OUTPATIENT)
Dept: ULTRASOUND IMAGING | Facility: OTHER | Age: 71
End: 2021-11-19
Attending: OBSTETRICS & GYNECOLOGY
Payer: MEDICARE

## 2021-11-19 VITALS
HEART RATE: 72 BPM | SYSTOLIC BLOOD PRESSURE: 124 MMHG | BODY MASS INDEX: 26.75 KG/M2 | WEIGHT: 151 LBS | DIASTOLIC BLOOD PRESSURE: 80 MMHG

## 2021-11-19 DIAGNOSIS — N83.202 CYST OF LEFT OVARY: ICD-10-CM

## 2021-11-19 DIAGNOSIS — N83.299 COMPLEX OVARIAN CYST: Primary | ICD-10-CM

## 2021-11-19 PROCEDURE — G0463 HOSPITAL OUTPT CLINIC VISIT: HCPCS

## 2021-11-19 PROCEDURE — 99213 OFFICE O/P EST LOW 20 MIN: CPT | Performed by: OBSTETRICS & GYNECOLOGY

## 2021-11-19 PROCEDURE — 76830 TRANSVAGINAL US NON-OB: CPT

## 2021-11-19 PROCEDURE — G0463 HOSPITAL OUTPT CLINIC VISIT: HCPCS | Mod: 25

## 2021-11-19 PROCEDURE — 76856 US EXAM PELVIC COMPLETE: CPT

## 2021-11-19 ASSESSMENT — PAIN SCALES - GENERAL: PAINLEVEL: NO PAIN (0)

## 2021-11-19 NOTE — NURSING NOTE
Chief Complaint   Patient presents with     Follow Up     Ultrasound        Medication Reconciliation: completed   Pretty Pittman LPN  11/19/2021 1:45 PM

## 2021-11-19 NOTE — PROGRESS NOTES
Follow-Up Visit    S: Ms. Afia Medrano is a 71 year old  here for f/u of ultrasound for left ovarian cyst.    O:  /80 (BP Location: Right arm, Patient Position: Sitting, Cuff Size: Adult Large)   Pulse 72   Wt 68.5 kg (151 lb)   LMP  (LMP Unknown)   Breastfeeding No   BMI 26.75 kg/m    Gen: Well-appearing, NAD  Pulm: nonlabored  Psych: appropriate mood and affect    Pelvic US:  FINDINGS:     Uterus: Prior hysterectomy.       Right ovary: The right ovary is not visualized. No right adnexal  masses.       Left ovary: Left ovary is 7.2 x 4.4 x 5.4 cm. There is a complex  cystic structure within the left ovary measuring 6.6 x 6.4 x 4.3 cm,  not significantly changed in size since 9/10/2021, with slight  differences in measurement likely due to technique. This is more  clearly visualized on the current exam due to the transvaginal  technique. There appears to be at least a single daughter cyst within  the cystic structure. There is also some complexity and low level  echoes scattered throughout the cystic structure, best demonstrated on  the cine clips. There is a somewhat irregular solid appearing mural  nodule at the posterior/inferior margin, which appears to have  internal vascularity.     Pelvic fluid: None.                                                                      IMPRESSION:      Complex cystic structure in the left ovary measuring up to 6.6 cm,  which has not changed significantly in size since 9/10/2021. This is  more clearly visualized on the current exam due to transvaginal  technique. There appears to be a somewhat irregular solid mural nodule  with internal vascularity. Overall, this is somewhat suspicious for a  cystic ovarian neoplasm.    A/P:  Ms. Afia Medrano is a 71 year old  here for f/u of left ovarian mass, noted to have some complex features today. Had a normal CA-125 last month. Although low suspicion of malignancy due to stable size and normal CA-125,  given mildly complex features, recommend removal of the ovarian cyst. Offered removal at [facility] with the possibility of needing a second surgery if ovarian cancer is found vs referral to gyn oncology for removal and she prefers a referral, order placed.   - RTC prarcelia Siddiqui MD  OB/GYN  11/19/2021 3:15 PM

## 2021-12-01 ENCOUNTER — TRANSFERRED RECORDS (OUTPATIENT)
Dept: HEALTH INFORMATION MANAGEMENT | Facility: OTHER | Age: 71
End: 2021-12-01
Payer: COMMERCIAL

## 2021-12-07 ENCOUNTER — OFFICE VISIT (OUTPATIENT)
Dept: FAMILY MEDICINE | Facility: OTHER | Age: 71
End: 2021-12-07
Attending: FAMILY MEDICINE
Payer: COMMERCIAL

## 2021-12-07 VITALS
HEART RATE: 82 BPM | SYSTOLIC BLOOD PRESSURE: 116 MMHG | RESPIRATION RATE: 16 BRPM | OXYGEN SATURATION: 97 % | WEIGHT: 155.2 LBS | HEIGHT: 63 IN | DIASTOLIC BLOOD PRESSURE: 86 MMHG | TEMPERATURE: 97.8 F | BODY MASS INDEX: 27.5 KG/M2

## 2021-12-07 DIAGNOSIS — K29.70 GASTRITIS WITHOUT BLEEDING, UNSPECIFIED CHRONICITY, UNSPECIFIED GASTRITIS TYPE: ICD-10-CM

## 2021-12-07 DIAGNOSIS — R13.19 OTHER DYSPHAGIA: ICD-10-CM

## 2021-12-07 DIAGNOSIS — Z01.818 PREOP GENERAL PHYSICAL EXAM: Primary | ICD-10-CM

## 2021-12-07 DIAGNOSIS — E55.9 VITAMIN D DEFICIENCY: ICD-10-CM

## 2021-12-07 DIAGNOSIS — E78.5 DYSLIPIDEMIA: ICD-10-CM

## 2021-12-07 DIAGNOSIS — G47.9 DISTURBANCE IN SLEEP BEHAVIOR: ICD-10-CM

## 2021-12-07 PROCEDURE — 93010 ELECTROCARDIOGRAM REPORT: CPT | Performed by: INTERNAL MEDICINE

## 2021-12-07 PROCEDURE — 99214 OFFICE O/P EST MOD 30 MIN: CPT | Performed by: FAMILY MEDICINE

## 2021-12-07 PROCEDURE — 93005 ELECTROCARDIOGRAM TRACING: CPT | Performed by: FAMILY MEDICINE

## 2021-12-07 PROCEDURE — G0463 HOSPITAL OUTPT CLINIC VISIT: HCPCS

## 2021-12-07 RX ORDER — ZOLPIDEM TARTRATE 10 MG/1
10 TABLET ORAL
Qty: 90 TABLET | Refills: 1 | Status: SHIPPED | OUTPATIENT
Start: 2021-12-07 | End: 2022-09-28

## 2021-12-07 ASSESSMENT — PAIN SCALES - GENERAL: PAINLEVEL: NO PAIN (0)

## 2021-12-07 ASSESSMENT — PATIENT HEALTH QUESTIONNAIRE - PHQ9
SUM OF ALL RESPONSES TO PHQ QUESTIONS 1-9: 4
10. IF YOU CHECKED OFF ANY PROBLEMS, HOW DIFFICULT HAVE THESE PROBLEMS MADE IT FOR YOU TO DO YOUR WORK, TAKE CARE OF THINGS AT HOME, OR GET ALONG WITH OTHER PEOPLE: NOT DIFFICULT AT ALL
SUM OF ALL RESPONSES TO PHQ QUESTIONS 1-9: 4

## 2021-12-07 ASSESSMENT — MIFFLIN-ST. JEOR: SCORE: 1193.11

## 2021-12-07 NOTE — PROGRESS NOTES
[facility]  [address]  [place] [address]  Phone: [phone]  Fax: [phone]  Primary Provider: Joan Mckeon  Pre-op Performing Provider: JOAN MCKEON      PREOPERATIVE EVALUATION:  Today's date: 12/7/2021    Afia Medrano is a 71 year old female who presents for a preoperative evaluation.    Surgical Information:  Surgery/Procedure: Lap BSO and frozen section  Surgery Location: [facility]   Surgeon: Dr. Cruz  Surgery Date: 12/14/21  Time of Surgery:   Where patient plans to recover: At home with family  Fax number for surgical facility: [phone]    Type of Anesthesia Anticipated: General    Assessment & Plan     The proposed surgical procedure is considered LOW risk.    Preop general physical exam    - CBC and Differential; Future  - EKG 12-lead complete w/read - Clinics    Disturbance in sleep behavior    - zolpidem (AMBIEN) 10 MG tablet; Take 1 tablet (10 mg) by mouth nightly as needed for sleep    Gastritis without bleeding, unspecified chronicity, unspecified gastritis type    - omeprazole (PRILOSEC) 20 MG DR capsule; Take 1 capsule (20 mg) by mouth 2 times daily Before a meal    Dyslipidemia    - Lipid Panel; Future    Vitamin D deficiency    - Vitamin D Total; Future    Other dysphagia             Risks and Recommendations:  The patient has the following additional risks and recommendations for perioperative complications:   - No identified additional risk factors other than previously addressed    Medication Instructions:  Patient is to take all scheduled medications on the day of surgery    RECOMMENDATION:  APPROVAL GIVEN to proceed with proposed procedure, without further diagnostic evaluation.    Review of external notes as documented above               Patient Instructions   Return for labs on Friday morning          Subjective     HPI related to upcoming procedure:   Pleasant 71-year-old female with  dyslipidemia, GERD and complex grief reaction presents for preop evaluation prior to laparoscopic BSO due to a large ovarian mass.    Initially patient was experiencing epigastric and upper abdominal pain.  CT scan was performed that showed potential ovarian cyst.  Pelvic ultrasound confirmed she was set up to see OB/GYN.  She since been seen at [facility] gynecology and they recommended BSO for frozen section.    Patient continues to have epigastric discomfort although its been improved since increasing Prilosec.  Weights been stable.  No associated nausea vomiting or change in bowel movements.        Preop Questions 12/7/2021   1. Have you ever had a heart attack or stroke? No   2. Have you ever had surgery on your heart or blood vessels, such as a stent placement, a coronary artery bypass, or surgery on an artery in your head, neck, heart, or legs? No   3. Do you have chest pain with activity? No   4. Do you have a history of  heart failure? No   5. Do you currently have a cold, bronchitis or symptoms of other infection? No   6. Do you have a cough, shortness of breath, or wheezing? No   7. Do you or anyone in your family have previous history of blood clots? No   8. Do you or does anyone in your family have a serious bleeding problem such as prolonged bleeding following surgeries or cuts? No   9. Have you ever had problems with anemia or been told to take iron pills? No   10. Have you had any abnormal blood loss such as black, tarry or bloody stools, or abnormal vaginal bleeding? No   11. Have you ever had a blood transfusion? No   12. Are you willing to have a blood transfusion if it is medically needed before, during, or after your surgery? Yes   13. Have you or any of your relatives ever had problems with anesthesia? No   14. Do you have sleep apnea, excessive snoring or daytime drowsiness? No   15. Do you have any artifical heart valves or other implanted medical devices like a pacemaker, defibrillator, or  continuous glucose monitor? No   16. Do you have artificial joints? No   17. Are you allergic to latex? No       Health Care Directive:  Patient does not have a Health Care Directive or Living Will: Discussed advance care planning with patient; however, patient declined at this time.    Preoperative Review of :   reviewed - no record of controlled substances prescribed.      Status of Chronic Conditions:  DEPRESSION - Patient has a long history of Depression of moderate severity requiring medication for control with recent symptoms being stable..Current symptoms of depression include none.     HYPERLIPIDEMIA - Patient has a long history of significant Hyperlipidemia requiring medication for treatment with recent good control. Patient reports no problems or side effects with the medication.       Review of Systems  CONSTITUTIONAL: NEGATIVE for fever, chills, change in weight  ENT/MOUTH: NEGATIVE for ear, mouth and throat problems  RESP: NEGATIVE for significant cough or SOB  CV: NEGATIVE for chest pain, palpitations or peripheral edema  Constitutional, neuro, ENT, endocrine, pulmonary, cardiac, gastrointestinal, genitourinary, musculoskeletal, integument and psychiatric systems are negative, except as otherwise noted.    Patient Active Problem List    Diagnosis Date Noted     Gastritis 06/28/2021     Priority: Medium     Sliding hiatal hernia 06/28/2021     Priority: Medium     Other dysphagia 06/22/2021     Priority: Medium     History of basal cell carcinoma 07/21/2020     Priority: Medium     MDD (major depressive disorder) 01/31/2018     Priority: Medium     Sleep disorder 08/01/2007     Priority: Medium     Sigmoid diverticulosis 02/24/2003     Priority: Medium      Past Medical History:   Diagnosis Date     Basal cell carcinoma of skin of nose     No Comments Provided     Hyperlipidemia     lowered zocor 2015     Other specified disorders of bone density and structure, unspecified site (CODE)      11/2014,DEXA,  t score -1.7, ca/vit d     Recurrent major depressive disorder (H)     No Comments Provided     Sleep disorder     No Comments Provided     Past Surgical History:   Procedure Laterality Date     COLONOSCOPY      2003,diverticulosis     COLONOSCOPY  04/24/2014    normal, repeat in 2024     ESOPHAGOSCOPY, GASTROSCOPY, DUODENOSCOPY (EGD), COMBINED N/A 6/28/2021    Procedure: ESOPHAGOGASTRODUODENOSCOPY, WITH BIOPSY;  Surgeon: Cody Alarcon MD;  Location: [facility] OR     OTHER SURGICAL HISTORY      [id number],PARTIAL HYSTERECTOMY,secondary to DUB. Ovaries in place. was on HRT, stopped patch in 7/2011     Current Outpatient Medications   Medication Sig Dispense Refill     aspirin 81 MG EC tablet Take 81 mg by mouth daily       Calcium Carb-Cholecalciferol 600-800 MG-UNIT CHEW Take 1 tablet by mouth daily       cholecalciferol (VITAMIN D-1000 MAX ST) 1000 UNITS TABS Take 1,000 Units by mouth daily       citalopram (CELEXA) 40 MG tablet TAKE 1 TABLET BY MOUTH AT BEDTIME 90 tablet 3     fluticasone (FLONASE) 50 MCG/ACT nasal spray SPRAY 1 SPRAY INTO BOTH NOSTRILS AT BEDTIME 16 mL 3     GLUCOSAMINE SULFATE PO Take 1 tablet by mouth daily       metroNIDAZOLE (METROGEL) 0.75 % external gel APPLY TO AFFECTED AREA TWICE A DAY 45 g 11     omeprazole (PRILOSEC) 20 MG DR capsule Take 1 capsule (20 mg) by mouth 2 times daily Before a meal 180 capsule 3     simvastatin (ZOCOR) 10 MG tablet TAKE 1 TABLET BY MOUTH EVERYDAY AT BEDTIME 90 tablet 1     sucralfate (CARAFATE) 1 GM tablet TAKE 1 TABLET (1 G) BY MOUTH 2 TIMES DAILY 180 tablet 3     triamcinolone (KENALOG) 0.1 % cream        zolpidem (AMBIEN) 10 MG tablet TAKE ONE TABLET BY MOUTH AT BEDTIME IF NEEDED FOR SLEEP 90 tablet 1       Allergies   Allergen Reactions     Seasonal Allergies Hives and Itching        Social History     Tobacco Use     Smoking status: Former Smoker     Packs/day: 1.00     Years: 20.00     Pack years: 20.00     Types: Cigarettes     Quit date: 1999      Years since quittin.9     Smokeless tobacco: Never Used   Substance Use Topics     Alcohol use: No     Family History   Problem Relation Age of Onset     Diabetes Father         Diabetes     Hypertension Father         Hypertension     Heart Disease Father         Heart Disease     Osteoporosis Mother         Osteoporosis,compression fracture     Heart Disease Mother         Heart Disease     Other - See Comments Other         negative breast cancer     Breast Cancer No family hx of         Cancer-breast     History   Drug Use No         Objective     LMP  (LMP Unknown)     Physical Exam    GENERAL APPEARANCE: healthy, alert and no distress     HENT: ear canals and TM's normal and nose and mouth without ulcers or lesions     NECK: no adenopathy, no asymmetry, masses, or scars and thyroid normal to palpation     RESP: lungs clear to auscultation - no rales, rhonchi or wheezes     CV: regular rates and rhythm, normal S1 S2, no S3 or S4 and no murmur, click or rub     ABDOMEN:  soft, nontender, no HSM or masses and bowel sounds normal     SKIN: no suspicious lesions or rashes     PSYCH: mentation appears normal. and affect normal/bright    Recent Labs   Lab Test 21  1129 06/15/21  1530 06/15/21  1528   HGB 14.5  --  13.4     --  168    139  --    POTASSIUM 4.0 4.2  --    CR 0.94 0.92  --         Diagnostics:  Recent Results (from the past 168 hour(s))   EKG 12-lead complete w/read - Clinics    Collection Time: 21 12:49 PM   Result Value Ref Range    Systolic Blood Pressure  mmHg    Diastolic Blood Pressure  mmHg    Ventricular Rate 68 BPM    Atrial Rate 68 BPM    NM Interval 148 ms    QRS Duration 84 ms     ms    QTc 446 ms    P Axis 15 degrees    R AXIS -10 degrees    T Axis 19 degrees    Interpretation ECG       Sinus rhythm with Premature atrial complexes  Otherwise normal ECG  No previous ECGs available  Confirmed by DO BUENO STACY ([id number]) on 2021 8:47:58 AM  Also confirmed  by DO BUENO STACY ([id number]),  Eulalia Parada ([id number])  on 12/8/2021 11:41:02 AM     Vitamin D Total    Collection Time: 12/09/21 10:14 AM   Result Value Ref Range    Vitamin D, Total (25-Hydroxy) 48 30 - 100 ug/L   Lipid Panel    Collection Time: 12/09/21 10:14 AM   Result Value Ref Range    Cholesterol 265 (H) <200 mg/dL    Triglycerides 208 (H) <150 mg/dL    Direct Measure HDL 52 23 - 92 mg/dL    LDL Cholesterol Calculated 171 (H) <=100 mg/dL    Non HDL Cholesterol 213 (H) <130 mg/dL    Patient Fasting > 8hrs? Yes    CBC with platelets and differential    Collection Time: 12/09/21 10:14 AM   Result Value Ref Range    WBC Count 5.2 4.0 - 11.0 10e3/uL    RBC Count 4.82 3.80 - 5.20 10e6/uL    Hemoglobin 14.2 11.7 - 15.7 g/dL    Hematocrit 42.8 35.0 - 47.0 %    MCV 89 78 - 100 fL    MCH 29.5 26.5 - 33.0 pg    MCHC 33.2 31.5 - 36.5 g/dL    RDW 12.5 10.0 - 15.0 %    Platelet Count 177 150 - 450 10e3/uL    % Neutrophils 62 %    % Lymphocytes 26 %    % Monocytes 8 %    % Eosinophils 3 %    % Basophils 1 %    % Immature Granulocytes 0 %    NRBCs per 100 WBC 0 <1 /100    Absolute Neutrophils 3.2 1.6 - 8.3 10e3/uL    Absolute Lymphocytes 1.4 0.8 - 5.3 10e3/uL    Absolute Monocytes 0.4 0.0 - 1.3 10e3/uL    Absolute Eosinophils 0.1 0.0 - 0.7 10e3/uL    Absolute Basophils 0.1 0.0 - 0.2 10e3/uL    Absolute Immature Granulocytes 0.0 <=0.4 10e3/uL    Absolute NRBCs 0.0 10e3/uL      EKG: appears normal, NSR, normal axis, normal intervals, no acute ST/T changes c/w ischemia, no LVH by voltage criteria, unchanged from previous tracings    Revised Cardiac Risk Index (RCRI):  The patient has the following serious cardiovascular risks for perioperative complications:   - No serious cardiac risks = 0 points     RCRI Interpretation: 0 points: Class I (very low risk - 0.4% complication rate)           Signed Electronically by: Joan Velazquez MD  Copy of this evaluation report is provided to requesting physician.      Answers  for HPI/ROS submitted by the patient on 12/7/2021  If you checked off any problems, how difficult have these problems made it for you to do your work, take care of things at home, or get along with other people?: Not difficult at all  PHQ9 TOTAL SCORE: 4

## 2021-12-08 LAB
ATRIAL RATE - MUSE: 68 BPM
DIASTOLIC BLOOD PRESSURE - MUSE: NORMAL MMHG
INTERPRETATION ECG - MUSE: NORMAL
P AXIS - MUSE: 15 DEGREES
PR INTERVAL - MUSE: 148 MS
QRS DURATION - MUSE: 84 MS
QT - MUSE: 420 MS
QTC - MUSE: 446 MS
R AXIS - MUSE: -10 DEGREES
SYSTOLIC BLOOD PRESSURE - MUSE: NORMAL MMHG
T AXIS - MUSE: 19 DEGREES
VENTRICULAR RATE- MUSE: 68 BPM

## 2021-12-08 ASSESSMENT — PATIENT HEALTH QUESTIONNAIRE - PHQ9: SUM OF ALL RESPONSES TO PHQ QUESTIONS 1-9: 4

## 2021-12-09 ENCOUNTER — LAB (OUTPATIENT)
Dept: LAB | Facility: OTHER | Age: 71
End: 2021-12-09
Attending: FAMILY MEDICINE
Payer: MEDICARE

## 2021-12-09 DIAGNOSIS — E55.9 VITAMIN D DEFICIENCY: ICD-10-CM

## 2021-12-09 DIAGNOSIS — Z01.818 PREOP GENERAL PHYSICAL EXAM: ICD-10-CM

## 2021-12-09 DIAGNOSIS — E78.5 DYSLIPIDEMIA: ICD-10-CM

## 2021-12-09 LAB
BASOPHILS # BLD AUTO: 0.1 10E3/UL (ref 0–0.2)
BASOPHILS NFR BLD AUTO: 1 %
CHOLEST SERPL-MCNC: 265 MG/DL
DEPRECATED CALCIDIOL+CALCIFEROL SERPL-MC: 48 UG/L (ref 30–100)
EOSINOPHIL # BLD AUTO: 0.1 10E3/UL (ref 0–0.7)
EOSINOPHIL NFR BLD AUTO: 3 %
ERYTHROCYTE [DISTWIDTH] IN BLOOD BY AUTOMATED COUNT: 12.5 % (ref 10–15)
FASTING STATUS PATIENT QL REPORTED: YES
HCT VFR BLD AUTO: 42.8 % (ref 35–47)
HDLC SERPL-MCNC: 52 MG/DL (ref 23–92)
HGB BLD-MCNC: 14.2 G/DL (ref 11.7–15.7)
IMM GRANULOCYTES # BLD: 0 10E3/UL
IMM GRANULOCYTES NFR BLD: 0 %
LDLC SERPL CALC-MCNC: 171 MG/DL
LYMPHOCYTES # BLD AUTO: 1.4 10E3/UL (ref 0.8–5.3)
LYMPHOCYTES NFR BLD AUTO: 26 %
MCH RBC QN AUTO: 29.5 PG (ref 26.5–33)
MCHC RBC AUTO-ENTMCNC: 33.2 G/DL (ref 31.5–36.5)
MCV RBC AUTO: 89 FL (ref 78–100)
MONOCYTES # BLD AUTO: 0.4 10E3/UL (ref 0–1.3)
MONOCYTES NFR BLD AUTO: 8 %
NEUTROPHILS # BLD AUTO: 3.2 10E3/UL (ref 1.6–8.3)
NEUTROPHILS NFR BLD AUTO: 62 %
NONHDLC SERPL-MCNC: 213 MG/DL
NRBC # BLD AUTO: 0 10E3/UL
NRBC BLD AUTO-RTO: 0 /100
PLATELET # BLD AUTO: 177 10E3/UL (ref 150–450)
RBC # BLD AUTO: 4.82 10E6/UL (ref 3.8–5.2)
TRIGL SERPL-MCNC: 208 MG/DL
WBC # BLD AUTO: 5.2 10E3/UL (ref 4–11)

## 2021-12-09 PROCEDURE — 80061 LIPID PANEL: CPT | Mod: ZL

## 2021-12-09 PROCEDURE — 85025 COMPLETE CBC W/AUTO DIFF WBC: CPT | Mod: ZL

## 2021-12-09 PROCEDURE — 82306 VITAMIN D 25 HYDROXY: CPT | Mod: ZL

## 2021-12-09 PROCEDURE — 36415 COLL VENOUS BLD VENIPUNCTURE: CPT | Mod: ZL

## 2021-12-14 ENCOUNTER — TRANSFERRED RECORDS (OUTPATIENT)
Dept: HEALTH INFORMATION MANAGEMENT | Facility: OTHER | Age: 71
End: 2021-12-14
Payer: COMMERCIAL

## 2021-12-29 ENCOUNTER — TRANSFERRED RECORDS (OUTPATIENT)
Dept: HEALTH INFORMATION MANAGEMENT | Facility: OTHER | Age: 71
End: 2021-12-29
Payer: COMMERCIAL

## 2022-02-07 DIAGNOSIS — F33.41 MAJOR DEPRESSIVE DISORDER, RECURRENT EPISODE, IN PARTIAL REMISSION (H): ICD-10-CM

## 2022-02-07 DIAGNOSIS — H69.90 EUSTACHIAN TUBE DISORDER, UNSPECIFIED LATERALITY: ICD-10-CM

## 2022-02-07 RX ORDER — FLUTICASONE PROPIONATE 50 MCG
1 SPRAY, SUSPENSION (ML) NASAL AT BEDTIME
Qty: 16 ML | Refills: 3 | Status: SHIPPED | OUTPATIENT
Start: 2022-02-07 | End: 2022-04-04

## 2022-02-07 RX ORDER — CITALOPRAM HYDROBROMIDE 40 MG/1
TABLET ORAL
Qty: 90 TABLET | Refills: 3 | Status: SHIPPED | OUTPATIENT
Start: 2022-02-07 | End: 2022-11-18

## 2022-02-07 NOTE — TELEPHONE ENCOUNTER
" Disp Refills Start End RUBEN   fluticasone (FLONASE) 50 MCG/ACT nasal spray 16 mL 3 10/1/2020  No   Sig - Route: SPRAY 1 SPRAY INTO BOTH NOSTRILS AT BEDTIME - Both Nostrils      Disp Refills Start End RUBEN   citalopram (CELEXA) 40 MG tablet 90 tablet 3 2/1/2021  No   Sig: TAKE 1 TABLET BY MOUTH AT BEDTIME       LOV: 12/7/2021  Future Office visit:  No future appointment scheduled at this time.       Requested Prescriptions   Pending Prescriptions Disp Refills     citalopram (CELEXA) 40 MG tablet [Pharmacy Med Name: CITALOPRAM HBR 40 MG TABLET] 90 tablet 3     Sig: TAKE 1 TABLET BY MOUTH EVERYDAY AT BEDTIME       SSRIs Protocol Passed - 2/7/2022 10:02 AM        Passed - PHQ-9 score less than 5 in past 6 months     Please review last PHQ-9 score.           Passed - Medication is active on med list        Passed - Patient is age 18 or older        Passed - No active pregnancy on record        Passed - No positive pregnancy test in last 12 months        Passed - Recent (6 mo) or future (30 days) visit within the authorizing provider's specialty     Patient had office visit in the last 6 months or has a visit in the next 30 days with authorizing provider or within the authorizing provider's specialty.  See \"Patient Info\" tab in inbasket, or \"Choose Columns\" in Meds & Orders section of the refill encounter.               fluticasone (FLONASE) 50 MCG/ACT nasal spray [Pharmacy Med Name: FLUTICASONE PROP 50 MCG SPRAY] 16 mL 3     Sig: SPRAY 1 SPRAY INTO BOTH NOSTRILS AT BEDTIME       Nasal Allergy Protocol Passed - 2/7/2022 10:02 AM        Passed - Patient is age 12 or older        Passed - Recent (12 mo) or future (30 days) visit within the authorizing provider's specialty     Patient has had an office visit with the authorizing provider or a provider within the authorizing providers department within the previous 12 mos or has a future within next 30 days. See \"Patient Info\" tab in inbasket, or \"Choose Columns\" in Meds & " Orders section of the refill encounter.              Passed - Medication is active on med list           Emma Ayala RN  ....................  2/7/2022   4:05 PM

## 2022-03-23 ENCOUNTER — OFFICE VISIT (OUTPATIENT)
Dept: FAMILY MEDICINE | Facility: OTHER | Age: 72
End: 2022-03-23
Attending: FAMILY MEDICINE
Payer: COMMERCIAL

## 2022-03-23 VITALS
RESPIRATION RATE: 18 BRPM | HEART RATE: 78 BPM | DIASTOLIC BLOOD PRESSURE: 98 MMHG | SYSTOLIC BLOOD PRESSURE: 142 MMHG | OXYGEN SATURATION: 94 % | TEMPERATURE: 98 F | WEIGHT: 156.4 LBS | BODY MASS INDEX: 27.71 KG/M2

## 2022-03-23 DIAGNOSIS — E78.2 MIXED DYSLIPIDEMIA: ICD-10-CM

## 2022-03-23 DIAGNOSIS — J01.00 SUBACUTE MAXILLARY SINUSITIS: ICD-10-CM

## 2022-03-23 DIAGNOSIS — R73.9 ELEVATED BLOOD SUGAR: Primary | ICD-10-CM

## 2022-03-23 LAB
CHOLEST SERPL-MCNC: 180 MG/DL
FASTING STATUS PATIENT QL REPORTED: YES
HBA1C MFR BLD: 5.5 % (ref 4–6.2)
HDLC SERPL-MCNC: 44 MG/DL (ref 23–92)
LDLC SERPL CALC-MCNC: 94 MG/DL
NONHDLC SERPL-MCNC: 136 MG/DL
TRIGL SERPL-MCNC: 211 MG/DL

## 2022-03-23 PROCEDURE — 36415 COLL VENOUS BLD VENIPUNCTURE: CPT | Mod: ZL | Performed by: FAMILY MEDICINE

## 2022-03-23 PROCEDURE — 83036 HEMOGLOBIN GLYCOSYLATED A1C: CPT | Mod: ZL | Performed by: FAMILY MEDICINE

## 2022-03-23 PROCEDURE — 80061 LIPID PANEL: CPT | Mod: ZL | Performed by: FAMILY MEDICINE

## 2022-03-23 PROCEDURE — 99214 OFFICE O/P EST MOD 30 MIN: CPT | Performed by: FAMILY MEDICINE

## 2022-03-23 PROCEDURE — G0463 HOSPITAL OUTPT CLINIC VISIT: HCPCS

## 2022-03-23 RX ORDER — SIMVASTATIN 10 MG
TABLET ORAL
Qty: 90 TABLET | Refills: 3 | Status: SHIPPED | OUTPATIENT
Start: 2022-03-23 | End: 2022-11-18

## 2022-03-23 ASSESSMENT — ANXIETY QUESTIONNAIRES
5. BEING SO RESTLESS THAT IT IS HARD TO SIT STILL: NOT AT ALL
GAD7 TOTAL SCORE: 0
2. NOT BEING ABLE TO STOP OR CONTROL WORRYING: NOT AT ALL
7. FEELING AFRAID AS IF SOMETHING AWFUL MIGHT HAPPEN: NOT AT ALL
3. WORRYING TOO MUCH ABOUT DIFFERENT THINGS: NOT AT ALL
GAD7 TOTAL SCORE: 0
GAD7 TOTAL SCORE: 0
4. TROUBLE RELAXING: NOT AT ALL
1. FEELING NERVOUS, ANXIOUS, OR ON EDGE: NOT AT ALL
7. FEELING AFRAID AS IF SOMETHING AWFUL MIGHT HAPPEN: NOT AT ALL
6. BECOMING EASILY ANNOYED OR IRRITABLE: NOT AT ALL

## 2022-03-23 ASSESSMENT — PAIN SCALES - GENERAL: PAINLEVEL: NO PAIN (0)

## 2022-03-23 ASSESSMENT — PATIENT HEALTH QUESTIONNAIRE - PHQ9
SUM OF ALL RESPONSES TO PHQ QUESTIONS 1-9: 0
SUM OF ALL RESPONSES TO PHQ QUESTIONS 1-9: 0
10. IF YOU CHECKED OFF ANY PROBLEMS, HOW DIFFICULT HAVE THESE PROBLEMS MADE IT FOR YOU TO DO YOUR WORK, TAKE CARE OF THINGS AT HOME, OR GET ALONG WITH OTHER PEOPLE: NOT DIFFICULT AT ALL

## 2022-03-23 NOTE — PROGRESS NOTES
"Assessment & Plan     Mixed dyslipidemia  Currently at goal. Continue zocor.   - simvastatin (ZOCOR) 10 MG tablet; TAKE 1 TABLET BY MOUTH EVERYDAY AT BEDTIME  - Lipid Panel; Future  - Lipid Panel    Elevated blood sugar  A1c is normal  - Hemoglobin A1c; Future  - Hemoglobin A1c    Subacute maxillary sinusitis  Discussed supportive cares.  Recommend nasal saline lavage.  If no improvement next 3 to 5 days, she can fill the prescription for antibiotics.  - amoxicillin-clavulanate (AUGMENTIN) 875-125 MG tablet; Take 1 tablet by mouth 2 times daily for 10 days             BMI:   Estimated body mass index is 27.71 kg/m  as calculated from the following:    Height as of 12/7/21: 1.6 m (5' 3\").    Weight as of this encounter: 70.9 kg (156 lb 6.4 oz).       Work on weight loss  Regular exercise  See Patient Instructions    No follow-ups on file.    Joan Velazquez MD  [facility]  There are no Patient Instructions on file for this visit.  Subjective     Afia Medrano is a 71 year old female who presents today for the following   health issues:  Nursing Notes:   Estefanía Hastings LPN  3/23/2022 10:12 AM  Sign at exiting of workspace  Patient is here to follow up and recheck on cholesterol, would like to check if she has DM, and discuss sinus cold.     No LMP recorded (lmp unknown). Patient has had a hysterectomy.  Medication Reconciliation: complete    FOOD SECURITY SCREENING QUESTIONS  Hunger Vital Signs:  Within the past 12 months we worried whether our food would run out before we got money to buy more. Never  Within the past 12 months the food we bought just didn't last and we didn't have money to get more. Never  Estefanía Hastings LPN 3/23/2022 10:10 AM       Advance care directive on file? no  Advance care directive provided to patient? no       Estefanía Hastings LPN    70 yo female presents for recheck on dyslipidemia    Sinus pressure/PND x 5 days. Low grade fever, ST. HA and ear " pain. No cough.     Patient is concerned she could have diabetes.  She just had a recent fasting blood sugar that was low 100.  She has had episodes of excessive thirst and dizziness in the evenings.    Resumed her Zocor 2 months ago.  And discontinued due to potential abdominal pain.  Repeat lipid panel was above goal so she resumed the medication and seems to be tolerating it fine.      Review of Systems  Constitutional, HEENT, cardiovascular, pulmonary, gi and gu systems are negative, except as otherwise noted.    Objective   BP (!) 142/98   Pulse 78   Temp 98  F (36.7  C) (Tympanic)   Resp 18   Wt 70.9 kg (156 lb 6.4 oz)   LMP  (LMP Unknown)   SpO2 94%   Breastfeeding No   BMI 27.71 kg/m    Body mass index is 27.71 kg/m .    Physical Exam  HENT:      Right Ear: Tympanic membrane normal.      Left Ear: Tympanic membrane normal.      Nose: Congestion present.      Mouth/Throat:      Mouth: Mucous membranes are moist.      Pharynx: Oropharynx is clear.   Eyes:      Conjunctiva/sclera: Conjunctivae normal.   Cardiovascular:      Rate and Rhythm: Normal rate.      Heart sounds: No murmur heard.  Pulmonary:      Effort: Pulmonary effort is normal. No respiratory distress.      Breath sounds: No wheezing.   Musculoskeletal:      Cervical back: Normal range of motion.   Neurological:      Mental Status: She is alert.           Results for orders placed or performed in visit on 03/23/22   Lipid Panel     Status: Abnormal   Result Value Ref Range    Cholesterol 180 <200 mg/dL    Triglycerides 211 (H) <150 mg/dL    Direct Measure HDL 44 23 - 92 mg/dL    LDL Cholesterol Calculated 94 <=100 mg/dL    Non HDL Cholesterol 136 (H) <130 mg/dL    Patient Fasting > 8hrs? Yes     Narrative    Cholesterol  Desirable:  <200 mg/dL    Triglycerides  Normal:  Less than 150 mg/dL  Borderline High:  150-199 mg/dL  High:  200-499 mg/dL  Very High:  Greater than or equal to 500 mg/dL    Direct Measure HDL  Female:  Greater than or  equal to 50 mg/dL   Male:  Greater than or equal to 40 mg/dL    LDL Cholesterol  Desirable:  <100mg/dL  Above Desirable:  100-129 mg/dL   Borderline High:  130-159 mg/dL   High:  160-189 mg/dL   Very High:  >= 190 mg/dL    Non HDL Cholesterol  Desirable:  130 mg/dL  Above Desirable:  130-159 mg/dL  Borderline High:  160-189 mg/dL  High:  190-219 mg/dL  Very High:  Greater than or equal to 220 mg/dL   Hemoglobin A1c     Status: Normal   Result Value Ref Range    Hemoglobin A1C 5.5 4.0 - 6.2 %                Answers for HPI/ROS submitted by the patient on 3/23/2022  Are you regularly taking any medication or supplement to lower your cholesterol?: Yes  Are you having muscle aches or other side effects that you think could be caused by your cholesterol lowering medication?: No  If you checked off any problems, how difficult have these problems made it for you to do your work, take care of things at home, or get along with other people?: Not difficult at all  PHQ9 TOTAL SCORE: 0  ALMA 7 TOTAL SCORE: 0  Frequency of checking blood sugars:: not at all  Diabetic concerns:: other  Paraesthesia present:: excessive thirst, blurry vision  How many servings of fruits and vegetables do you eat daily?: 2-3  On average, how many sweetened beverages do you drink each day (Examples: soda, juice, sweet tea, etc.  Do NOT count diet or artificially sweetened beverages)?: 1  How many minutes a day do you exercise enough to make your heart beat faster?: 10 to 19  How many days a week do you exercise enough to make your heart beat faster?: 3 or less  How many days per week do you miss taking your medication?: 0

## 2022-03-23 NOTE — NURSING NOTE
Patient is here to follow up and recheck on cholesterol, would like to check if she has DM, and discuss sinus cold.     No LMP recorded (lmp unknown). Patient has had a hysterectomy.  Medication Reconciliation: complete    FOOD SECURITY SCREENING QUESTIONS  Hunger Vital Signs:  Within the past 12 months we worried whether our food would run out before we got money to buy more. Never  Within the past 12 months the food we bought just didn't last and we didn't have money to get more. Never  Estefanía Hastings LPN 3/23/2022 10:10 AM       Advance care directive on file? no  Advance care directive provided to patient? no       Estefanía Hastings LPN

## 2022-03-24 ASSESSMENT — PATIENT HEALTH QUESTIONNAIRE - PHQ9: SUM OF ALL RESPONSES TO PHQ QUESTIONS 1-9: 0

## 2022-03-24 ASSESSMENT — ANXIETY QUESTIONNAIRES: GAD7 TOTAL SCORE: 0

## 2022-03-28 ENCOUNTER — HOSPITAL ENCOUNTER (EMERGENCY)
Facility: OTHER | Age: 72
Discharge: HOME | End: 2022-03-28
Attending: INTERNAL MEDICINE | Admitting: INTERNAL MEDICINE
Payer: MEDICARE

## 2022-03-28 VITALS
DIASTOLIC BLOOD PRESSURE: 109 MMHG | RESPIRATION RATE: 16 BRPM | BODY MASS INDEX: 26.75 KG/M2 | HEART RATE: 80 BPM | WEIGHT: 151 LBS | SYSTOLIC BLOOD PRESSURE: 158 MMHG | TEMPERATURE: 97.5 F

## 2022-03-28 DIAGNOSIS — I10 BENIGN ESSENTIAL HYPERTENSION: ICD-10-CM

## 2022-03-28 PROCEDURE — 99283 EMERGENCY DEPT VISIT LOW MDM: CPT | Performed by: INTERNAL MEDICINE

## 2022-03-28 PROCEDURE — 99282 EMERGENCY DEPT VISIT SF MDM: CPT | Performed by: INTERNAL MEDICINE

## 2022-03-28 RX ORDER — CHLORTHALIDONE 25 MG/1
12.5-25 TABLET ORAL DAILY
Qty: 90 TABLET | Refills: 0 | Status: SHIPPED | OUTPATIENT
Start: 2022-03-28 | End: 2022-06-21

## 2022-03-28 NOTE — ED TRIAGE NOTES
Patient states that she started checking her blood pressure about a week ago and has felt dizzy with blurred vision that comes and goes for the last three weeks. Today she was visiting her mother at the nursing home and she checked her blood pressure at the nursing home and it was 150/110 and she was recommended by staff to come in and be checked out since she has been having dizzy spells. She does state she recently started an antibiotic for a sinus infection last week.

## 2022-03-28 NOTE — ED PROVIDER NOTES
Emergency Department Provider Note  : 1950 Age: 71 year old Sex: female MRN: [id number]    Chief Complaint   Patient presents with     Hypertension       Medical Decision Making / Assessment / Plan   71 year old female presenting with hypertension.  Blood pressures have remained elevated at recent clinic visit as well as at home and now in the emergency room.    Previously on hydrochlorothiazide.  Start chlorthalidone 12.5-25 mg once daily.  Half to 1 tablet daily.    ED Course as of 22 1304   Mon Mar 28, 2022   1253 Patient evaluated.  Blood pressures remain elevated today as well as recent clinic visit.  Previously on hydrochlorothiazide 25 mg daily.   1257 She otherwise feels fine.  Start chlorthalidone 12.5-25 mg once daily.  Close clinic follow-up advised.        The patient was informed of the plan and verbalized understanding and agreed with the plan. The patient was given strict return to Emergency Department precautions as well as appropriate follow up instructions. The patient was discharged in stable condition.    New Prescriptions    CHLORTHALIDONE (HYGROTON) 25 MG TABLET    Take 0.5-1 tablets (12.5-25 mg) by mouth daily       Final diagnoses:   Benign essential hypertension       Irwin Reina MD  3/28/2022   Emergency Department    Subjective   Afia is a 71 year old female who presents at 12:29 PM with high blood pressure with some symptoms associated with her blood pressure elevation.  Was having a little bit of blurry vision which has come and gone over the last month or so.  Has been retired for about 10 years and has not taken any blood pressure medications during that time.  Was in the clinic recently blood pressures were elevated at that time.  Has been elevated at home and also elevated in the emergency room today.    Has had mild headache on and off for the last month.  Started on sinus medication, amoxicillin, on Saturday after being seen in the clinic last  week.    Otherwise feels fine.    I have reviewed the Medications, Allergies, Past Medical and Surgical History, and Social History in the Epic System and with family.    Review of Systems:  Please see HPI for pertinent positives and negatives. All other systems reviewed and found to be negative.      Objective     Patient Vitals for the past 24 hrs:   BP Temp Temp src Pulse Resp Weight   03/28/22 1206 (!) 152/95 97.5  F (36.4  C) Tympanic 81 16 68.5 kg (151 lb)       Physical Exam:   General: Awake, alert, in no acute distress.  Head: Normocephalic, atraumatic.  Eyes: Conjugate gaze.  ENT: Moist membranes, external ear appears normal.   Chest/Respiratory: Equal chest rise, clear throughout bilaterally..  Cardiovascular: Peripheral pulses present regular rate and rhythm.  No edema..  Abdominal: Soft, non-distended, non-tender.  Extremities: No obvious deformity.  Neurological: GCS 15, moving all extremities without gross deficit.  Skin: Warm, no rashes, lesions, or bruising.   Psychiatric: Appropriate affect.     Procedures / Critical Care   Procedures    Critical Care Time: None.     No orders of the defined types were placed in this encounter.         Medical/Surgical History:  Past Medical History:   Diagnosis Date     Basal cell carcinoma of skin of nose     No Comments Provided     Hyperlipidemia     lowered zocor 2015     Other gastritis with bleeding 2020     Other specified disorders of bone density and structure, unspecified site (CODE)     11/2014,DEXA,  t score -1.7, ca/vit d     Recurrent major depressive disorder (H)     No Comments Provided     Sleep disorder     No Comments Provided     Past Surgical History:   Procedure Laterality Date     COLONOSCOPY      2003,diverticulosis     COLONOSCOPY  04/24/2014    normal, repeat in 2024     ESOPHAGOSCOPY, GASTROSCOPY, DUODENOSCOPY (EGD), COMBINED N/A 6/28/2021    Procedure: ESOPHAGOGASTRODUODENOSCOPY, WITH BIOPSY;  Surgeon: Cody Alarcon MD;  Location:  OR      OTHER SURGICAL HISTORY      GEY121,PARTIAL HYSTERECTOMY,secondary to DUB. Ovaries in place. was on HRT, stopped patch in 7/2011       Medications:  No current facility-administered medications for this encounter.     Current Outpatient Medications   Medication     chlorthalidone (HYGROTON) 25 MG tablet     amoxicillin-clavulanate (AUGMENTIN) 875-125 MG tablet     aspirin 81 MG EC tablet     Calcium Carb-Cholecalciferol 600-800 MG-UNIT CHEW     cholecalciferol (VITAMIN D-1000 MAX ST) 1000 UNITS TABS     citalopram (CELEXA) 40 MG tablet     fluticasone (FLONASE) 50 MCG/ACT nasal spray     GLUCOSAMINE SULFATE PO     metroNIDAZOLE (METROGEL) 0.75 % external gel     omeprazole (PRILOSEC) 20 MG DR capsule     simvastatin (ZOCOR) 10 MG tablet     sucralfate (CARAFATE) 1 GM tablet     triamcinolone (KENALOG) 0.1 % cream     zolpidem (AMBIEN) 10 MG tablet       Allergies:  Seasonal allergies    Relevant labs, images, EKGs, Epic and outside hospital (if applicable) charts were reviewed. The findings, diagnosis, plan, and need for follow up were discussed with the patient/family. Nursing notes were reviewed.      Irwin Reina MD  03/28/22 [id number]

## 2022-03-28 NOTE — DISCHARGE INSTRUCTIONS
Start chlorthalidone 12.5-25 mg once daily.  Half to 1 tablet daily.     Schedule clinic follow-up with primary care provider in 2 to 4 weeks for hypertension follow-up.    Blood pressure checks at home - check some in AM, some in Afternoon, some in Evening and record   -- bring these with you to your next appointment.     Goal blood pressures -- less than 140 and less than 90.    -- Ideally would like the numbers about 110-130 and 70-80.  -- If running higher or lower than this on regular basis, will need to adjust your medications.

## 2022-04-03 ENCOUNTER — MYC MEDICAL ADVICE (OUTPATIENT)
Dept: FAMILY MEDICINE | Facility: OTHER | Age: 72
End: 2022-04-03
Payer: COMMERCIAL

## 2022-04-03 DIAGNOSIS — H69.90 EUSTACHIAN TUBE DISORDER, UNSPECIFIED LATERALITY: ICD-10-CM

## 2022-04-04 NOTE — TELEPHONE ENCOUNTER
fluticasone (FLONASE) 50 MCG/ACT nasal spray     Sig: USE 1 SPRAY INTO BOTH NOSTRILS AT BEDTIME     Last Written Prescription Date:  2/17/22  Last Fill Quantity: 16ml,   # refills: 3  Last Office Visit: 3/23/22  Future Office visit:       Routing refill request to provider for review/approval because:  Drug not on the [facility], P or [facility] refill protocol or controlled substance She Danielle RN on 4/4/2022 at 3:43 PM

## 2022-04-05 RX ORDER — FLUTICASONE PROPIONATE 50 MCG
SPRAY, SUSPENSION (ML) NASAL
Qty: 16 ML | Refills: 3 | Status: SHIPPED | OUTPATIENT
Start: 2022-04-05 | End: 2022-08-30

## 2022-04-28 ENCOUNTER — ALLIED HEALTH/NURSE VISIT (OUTPATIENT)
Dept: FAMILY MEDICINE | Facility: OTHER | Age: 72
End: 2022-04-28
Attending: FAMILY MEDICINE
Payer: MEDICARE

## 2022-04-28 DIAGNOSIS — Z23 HIGH PRIORITY FOR 2019-NCOV VACCINE: Primary | ICD-10-CM

## 2022-04-28 PROCEDURE — 91305 COVID-19,PF,PFIZER (12+ YRS): CPT

## 2022-06-18 DIAGNOSIS — I10 HYPERTENSION, UNSPECIFIED TYPE: Primary | ICD-10-CM

## 2022-06-20 NOTE — TELEPHONE ENCOUNTER
"[facility] [id number] sent Rx request for the following:      Requested Prescriptions   Pending Prescriptions Disp Refills     chlorthalidone (HYGROTON) 25 MG tablet [Pharmacy Med Name: CHLORTHALIDONE 25 MG TABLET] 90 tablet 0     Sig: TAKE 0.5-1 TABLETS (12.5-25 MG) BY MOUTH DAILY       Diuretics (Including Combos) Protocol Failed - 6/20/2022  9:41 AM        Failed - Blood pressure under 140/90 in past 12 months     BP Readings from Last 3 Encounters:   03/28/22 (!) 158/109   03/23/22 (!) 142/98   12/07/21 116/86                 Failed - Recent (12 mo) or future (30 days) visit within the authorizing provider's specialty     Patient has had an office visit with the authorizing provider or a provider within the authorizing providers department within the previous 12 mos or has a future within next 30 days. See \"Patient Info\" tab in inbasket, or \"Choose Columns\" in Meds & Orders section of the refill encounter.             Last Prescription Date:   3/28/2022  Last Fill Qty/Refills:         90, R-0    Last Office Visit:              3/23/2022   Future Office visit:           NONE    Unable to complete prescription refill per RN Medication Refill Policy.       Jillian Rosa RN on 6/20/2022 at 9:41 AM        "

## 2022-06-21 RX ORDER — CHLORTHALIDONE 25 MG/1
12.5-25 TABLET ORAL DAILY
Qty: 90 TABLET | Refills: 0 | Status: SHIPPED | OUTPATIENT
Start: 2022-06-21 | End: 2022-09-21

## 2022-06-29 ENCOUNTER — OFFICE VISIT (OUTPATIENT)
Dept: FAMILY MEDICINE | Facility: OTHER | Age: 72
End: 2022-06-29
Attending: CHIROPRACTOR
Payer: MEDICARE

## 2022-06-29 VITALS
DIASTOLIC BLOOD PRESSURE: 81 MMHG | OXYGEN SATURATION: 93 % | BODY MASS INDEX: 27.16 KG/M2 | TEMPERATURE: 97.1 F | HEIGHT: 63 IN | WEIGHT: 153.3 LBS | RESPIRATION RATE: 18 BRPM | SYSTOLIC BLOOD PRESSURE: 110 MMHG | HEART RATE: 71 BPM

## 2022-06-29 DIAGNOSIS — M21.6X1 PRONATION DEFORMITY OF BOTH FEET: Primary | ICD-10-CM

## 2022-06-29 DIAGNOSIS — M21.612 BUNION, LEFT: ICD-10-CM

## 2022-06-29 DIAGNOSIS — M21.6X2 PRONATION DEFORMITY OF BOTH FEET: Primary | ICD-10-CM

## 2022-06-29 DIAGNOSIS — M21.611 BUNION, RIGHT: ICD-10-CM

## 2022-06-29 PROCEDURE — L3030 FOOT ARCH SUPPORT REMOV PREM: HCPCS | Performed by: CHIROPRACTOR

## 2022-06-29 ASSESSMENT — PAIN SCALES - GENERAL: PAINLEVEL: NO PAIN (0)

## 2022-06-29 NOTE — PROGRESS NOTES
Afia Medrano was seen for orthotic evaluation. Self-referred    CHIEF COMPLAINT: Afia Medrano is a 71 year old  female  Chief Complaint   Patient presents with     Orthotic evaluation     Evaluation     Previous Sole Supports orthotics made greater than 15 years ago.   She brings these in which are intact but the covers are in poor shape.  She is possibly interested in getting these recovered and will check on price for her.  She would like to proceed with a new pair at this time as well.      ORTHOTIC EXAM:     Right Foot:     Forefoot varus, Rearfoot valgus   Foot flexibility: 40 degrees   Excessive Pronation: mild   Excessive Supination: n/a   Bunion: moderate   Pes Planus: mild   Pes Cavus: neg   Heel Pain: neg   Hammer Toe: neg   Mallet Toe: neg   Hallux Rigidus: neg   Hallux Varus: neg   Skin: WNL   Sensation: Intact   Edema: neg     Left Foot:     Forefoot varus, Rearfoot valgus   Foot flexibility: 40 degrees   Excessive Pronation: mild   Excessive Supination: n/a   Bunion: moderate   Pes Planus: mild   Pes Cavus: neg   Heel Pain: neg   Hammer Toe: neg   Mallet Toe: neg   Hallux Rigidus: neg   Hallux Varus: neg   Skin: WNL   Sensation: Intact   Edema: neg     Mild worsening of pronation deformity on the left.  She is noticing more left knee pain when asked but denies any LBP or hip pain.       IMPRESSION/PLAN:    Casted for sole supports orthotics.  We will notify her once they return from the lab.  Fitting and wearing instruction to follow.  She had no further questions.          Jimmy Bojorquez DC, DIMITRIS, CICE  Director - Occupational Medicine Department  Diplomate - American Board of Forensic Professionals  Board Certified - American Board of Independent Medical Examiners  [facility]  [address] [place] [id number]  Phone [phone]  Fax [phone]    10:45 AM 6/29/2022

## 2022-07-18 ENCOUNTER — TELEPHONE (OUTPATIENT)
Dept: FAMILY MEDICINE | Facility: OTHER | Age: 72
End: 2022-07-18

## 2022-07-18 NOTE — TELEPHONE ENCOUNTER
Mary called regarding Afia and she was wondering what the time frame is for getting the orthotics.  Norma J. Gosselin, LPN .......  7/18/2022  3:28 PM

## 2022-07-19 ENCOUNTER — OFFICE VISIT (OUTPATIENT)
Dept: FAMILY MEDICINE | Facility: OTHER | Age: 72
End: 2022-07-19
Attending: CHIROPRACTOR
Payer: MEDICARE

## 2022-07-19 VITALS
SYSTOLIC BLOOD PRESSURE: 112 MMHG | DIASTOLIC BLOOD PRESSURE: 66 MMHG | RESPIRATION RATE: 14 BRPM | TEMPERATURE: 98.2 F | HEART RATE: 81 BPM | HEIGHT: 63 IN | WEIGHT: 154.7 LBS | OXYGEN SATURATION: 96 % | BODY MASS INDEX: 27.41 KG/M2

## 2022-07-19 DIAGNOSIS — M21.6X2 PRONATION DEFORMITY OF BOTH FEET: Primary | ICD-10-CM

## 2022-07-19 DIAGNOSIS — M21.6X1 PRONATION DEFORMITY OF BOTH FEET: Primary | ICD-10-CM

## 2022-07-19 PROCEDURE — G0463 HOSPITAL OUTPT CLINIC VISIT: HCPCS

## 2022-07-19 ASSESSMENT — PAIN SCALES - GENERAL: PAINLEVEL: NO PAIN (0)

## 2022-07-19 NOTE — PROGRESS NOTES
Orthotic fitting, training and use instruction.  She also would like her old pair resurfaced and we will send these in to lab and notify when completed.

## 2022-07-19 NOTE — NURSING NOTE
Patient presents to clinic for orthotic fitting.  Angelia Wiggins LPN ....................  7/19/2022   1:22 PM

## 2022-08-01 ENCOUNTER — TELEPHONE (OUTPATIENT)
Dept: FAMILY MEDICINE | Facility: OTHER | Age: 72
End: 2022-08-01

## 2022-08-01 NOTE — TELEPHONE ENCOUNTER
Left message for pt to make a follow up appointment to trim/measure inserts.  Vangie Bolaños LPN

## 2022-08-11 ENCOUNTER — OFFICE VISIT (OUTPATIENT)
Dept: FAMILY MEDICINE | Facility: OTHER | Age: 72
End: 2022-08-11
Attending: CHIROPRACTOR
Payer: MEDICARE

## 2022-08-11 VITALS
SYSTOLIC BLOOD PRESSURE: 118 MMHG | BODY MASS INDEX: 27.65 KG/M2 | WEIGHT: 153.6 LBS | HEART RATE: 84 BPM | OXYGEN SATURATION: 94 % | TEMPERATURE: 97.8 F | RESPIRATION RATE: 16 BRPM | DIASTOLIC BLOOD PRESSURE: 70 MMHG

## 2022-08-11 DIAGNOSIS — M21.6X1 PRONATION DEFORMITY OF BOTH FEET: Primary | ICD-10-CM

## 2022-08-11 DIAGNOSIS — M21.6X2 PRONATION DEFORMITY OF BOTH FEET: Primary | ICD-10-CM

## 2022-08-11 PROCEDURE — L4210 ORTH DEV REPAIR/REPL MINOR P: HCPCS | Performed by: CHIROPRACTOR

## 2022-08-11 PROCEDURE — G0463 HOSPITAL OUTPT CLINIC VISIT: HCPCS

## 2022-08-11 RX ORDER — OXYCODONE HYDROCHLORIDE 5 MG/1
TABLET ORAL
COMMUNITY
Start: 2021-12-14 | End: 2022-11-21

## 2022-08-11 RX ORDER — IBUPROFEN 800 MG/1
TABLET, FILM COATED ORAL
COMMUNITY
Start: 2021-12-14 | End: 2022-11-21

## 2022-08-11 ASSESSMENT — PAIN SCALES - GENERAL: PAINLEVEL: NO PAIN (0)

## 2022-08-11 NOTE — NURSING NOTE
"Chief Complaint   Patient presents with     Orthotics       FOOD SECURITY SCREENING QUESTIONS  Hunger Vital Signs:  Within the past 12 months we worried whether our food would run out before we got money to buy more. Never  Within the past 12 months the food we bought just didn't last and we didn't have money to get more. Never  Jumana Camara LPN 8/11/2022 1:49 PM      Initial /70 (BP Location: Right arm, Patient Position: Sitting, Cuff Size: Adult Regular)   Pulse 84   Temp 97.8  F (36.6  C) (Tympanic)   Resp 16   Wt 69.7 kg (153 lb 9.6 oz)   LMP  (LMP Unknown)   SpO2 94%   BMI 27.65 kg/m   Estimated body mass index is 27.65 kg/m  as calculated from the following:    Height as of 7/19/22: 1.588 m (5' 2.5\").    Weight as of this encounter: 69.7 kg (153 lb 9.6 oz).  Medication Reconciliation: complete    Jumana Camara LPN  "

## 2022-08-30 DIAGNOSIS — H69.90 EUSTACHIAN TUBE DISORDER, UNSPECIFIED LATERALITY: ICD-10-CM

## 2022-08-30 RX ORDER — FLUTICASONE PROPIONATE 50 MCG
SPRAY, SUSPENSION (ML) NASAL
Qty: 48 ML | Refills: 1 | Status: SHIPPED | OUTPATIENT
Start: 2022-08-30

## 2022-08-30 NOTE — TELEPHONE ENCOUNTER
[facility] sent Rx request for the following:      FLUTICASONE PROP 50 MCG SPRAY      Last Prescription Date:   4/5/2022  Last Fill Qty/Refills:         16ml, R-3    Last Office Visit:              3/23/2022   Future Office visit:           none    Armand Paredes RN, BSN  ....................  8/30/2022   4:02 PM

## 2022-09-17 ENCOUNTER — HEALTH MAINTENANCE LETTER (OUTPATIENT)
Age: 72
End: 2022-09-17

## 2022-09-20 DIAGNOSIS — I10 HYPERTENSION, UNSPECIFIED TYPE: ICD-10-CM

## 2022-09-21 RX ORDER — CHLORTHALIDONE 25 MG/1
12.5-25 TABLET ORAL DAILY
Qty: 90 TABLET | Refills: 0 | Status: SHIPPED | OUTPATIENT
Start: 2022-09-21 | End: 2022-10-21

## 2022-09-21 NOTE — TELEPHONE ENCOUNTER
Refilled medication.  Due for Medicare annual wellness visit along with medication recheck.  Gabrielle Hunt PA-C.......... 9/21/2022 2:05 PM

## 2022-09-21 NOTE — TELEPHONE ENCOUNTER
[id number] in [employer] [facility] [place] sent Rx request for the following:      Requested Prescriptions   Pending Prescriptions Disp Refills     chlorthalidone (HYGROTON) 25 MG tablet [Pharmacy Med Name: CHLORTHALIDONE 25 MG TABLET] 90 tablet 0     Sig: TAKE 0.5-1 TABLETS (12.5-25 MG) BY MOUTH DAILY       Diuretics (Including Combos) Protocol Failed - 9/21/2022  1:59 PM        Failed - Normal serum creatinine on file in past 12 months     Recent Labs   Lab Test 08/26/21  1129   CR 0.94           Failed - Normal serum potassium on file in past 12 months     Recent Labs   Lab Test 08/26/21  1129   POTASSIUM 4.0           Failed - Normal serum sodium on file in past 12 months     Recent Labs   Lab Test 08/26/21  1129           Last Prescription Date:   6/21/22  Last Fill Qty/Refills:         90, R-0    Last Office Visit:              12/7/21   Future Office visit:           None    In clinical absence of patient's primary, Joan Mckeon, patient is requesting that this message be sent to the covering provider for consideration please.    Ivette Gold RN .............. 9/21/2022  2:02 PM

## 2022-09-22 NOTE — TELEPHONE ENCOUNTER
Notified patient of refill. Was transferred to scheduling to make an appt.  ..Wendy Amezquita LPN on 9/22/2022 at 10:59 AM

## 2022-09-25 DIAGNOSIS — G47.9 DISTURBANCE IN SLEEP BEHAVIOR: ICD-10-CM

## 2022-09-28 NOTE — TELEPHONE ENCOUNTER
Last Prescription Date: 12/7/21  Last Qty/Refills: 90 / 1  Last Office Visit: 3/23/22  Future Office Visit: 11/18/22     Requested Prescriptions   Pending Prescriptions Disp Refills     zolpidem (AMBIEN) 10 MG tablet [Pharmacy Med Name: ZOLPIDEM TARTRATE 10 MG TABLET] 90 tablet      Sig: TAKE 1 TABLET (10 MG) BY MOUTH NIGHTLY AS NEEDED FOR SLEEP *NOT COVERED, SENT FAX TO *       There is no refill protocol information for this order

## 2022-09-29 RX ORDER — ZOLPIDEM TARTRATE 10 MG/1
TABLET ORAL
Qty: 90 TABLET | Refills: 0 | Status: SHIPPED | OUTPATIENT
Start: 2022-09-29 | End: 2022-11-18

## 2022-10-06 ENCOUNTER — OFFICE VISIT (OUTPATIENT)
Dept: INTERNAL MEDICINE | Facility: OTHER | Age: 72
End: 2022-10-06
Attending: STUDENT IN AN ORGANIZED HEALTH CARE EDUCATION/TRAINING PROGRAM
Payer: COMMERCIAL

## 2022-10-06 VITALS
HEART RATE: 87 BPM | OXYGEN SATURATION: 94 % | DIASTOLIC BLOOD PRESSURE: 70 MMHG | TEMPERATURE: 98.4 F | RESPIRATION RATE: 16 BRPM | WEIGHT: 149.8 LBS | SYSTOLIC BLOOD PRESSURE: 112 MMHG | BODY MASS INDEX: 26.96 KG/M2

## 2022-10-06 DIAGNOSIS — K29.70 GASTRITIS WITHOUT BLEEDING, UNSPECIFIED CHRONICITY, UNSPECIFIED GASTRITIS TYPE: ICD-10-CM

## 2022-10-06 DIAGNOSIS — K44.9 SLIDING HIATAL HERNIA: ICD-10-CM

## 2022-10-06 DIAGNOSIS — Z01.818 PREOPERATIVE EXAMINATION: Primary | ICD-10-CM

## 2022-10-06 DIAGNOSIS — I10 BENIGN ESSENTIAL HYPERTENSION: ICD-10-CM

## 2022-10-06 DIAGNOSIS — R13.19 OTHER DYSPHAGIA: ICD-10-CM

## 2022-10-06 DIAGNOSIS — R12 HEARTBURN: ICD-10-CM

## 2022-10-06 LAB
ANION GAP SERPL CALCULATED.3IONS-SCNC: 7 MMOL/L (ref 3–14)
BUN SERPL-MCNC: 13 MG/DL (ref 7–25)
CALCIUM SERPL-MCNC: 9.7 MG/DL (ref 8.6–10.3)
CHLORIDE BLD-SCNC: 100 MMOL/L (ref 98–107)
CO2 SERPL-SCNC: 32 MMOL/L (ref 21–31)
CREAT SERPL-MCNC: 0.87 MG/DL (ref 0.6–1.2)
ERYTHROCYTE [DISTWIDTH] IN BLOOD BY AUTOMATED COUNT: 12.8 % (ref 10–15)
GFR SERPL CREATININE-BSD FRML MDRD: 71 ML/MIN/1.73M2
GLUCOSE BLD-MCNC: 91 MG/DL (ref 70–105)
HCT VFR BLD AUTO: 39.3 % (ref 35–47)
HGB BLD-MCNC: 13.4 G/DL (ref 11.7–15.7)
MCH RBC QN AUTO: 29.1 PG (ref 26.5–33)
MCHC RBC AUTO-ENTMCNC: 34.1 G/DL (ref 31.5–36.5)
MCV RBC AUTO: 85 FL (ref 78–100)
PLATELET # BLD AUTO: 254 10E3/UL (ref 150–450)
POTASSIUM BLD-SCNC: 3.1 MMOL/L (ref 3.5–5.1)
RBC # BLD AUTO: 4.6 10E6/UL (ref 3.8–5.2)
SODIUM SERPL-SCNC: 139 MMOL/L (ref 134–144)
WBC # BLD AUTO: 6.5 10E3/UL (ref 4–11)

## 2022-10-06 PROCEDURE — 82310 ASSAY OF CALCIUM: CPT | Mod: ZL | Performed by: STUDENT IN AN ORGANIZED HEALTH CARE EDUCATION/TRAINING PROGRAM

## 2022-10-06 PROCEDURE — G0008 ADMIN INFLUENZA VIRUS VAC: HCPCS

## 2022-10-06 PROCEDURE — G0463 HOSPITAL OUTPT CLINIC VISIT: HCPCS | Mod: 25

## 2022-10-06 PROCEDURE — 36415 COLL VENOUS BLD VENIPUNCTURE: CPT | Mod: ZL | Performed by: STUDENT IN AN ORGANIZED HEALTH CARE EDUCATION/TRAINING PROGRAM

## 2022-10-06 PROCEDURE — 99214 OFFICE O/P EST MOD 30 MIN: CPT | Performed by: STUDENT IN AN ORGANIZED HEALTH CARE EDUCATION/TRAINING PROGRAM

## 2022-10-06 PROCEDURE — 85027 COMPLETE CBC AUTOMATED: CPT | Mod: ZL | Performed by: STUDENT IN AN ORGANIZED HEALTH CARE EDUCATION/TRAINING PROGRAM

## 2022-10-06 ASSESSMENT — PAIN SCALES - GENERAL: PAINLEVEL: NO PAIN (0)

## 2022-10-06 NOTE — LETTER
October 6, 2022      Mary Medrano  [address]  [facility] [address]        Dear Ms.Dorry Medrano,    We are writing to inform you of your test results.    Your lab results look good.  Best of luck with surgery.  Your potassium was incidentally a little low.  I recommend increasing your fruit intake by 1-2 items of fruit per week.  This will bring your potassium up.    Resulted Orders   Basic Metabolic Panel   Result Value Ref Range    Sodium 139 134 - 144 mmol/L    Potassium 3.1 (L) 3.5 - 5.1 mmol/L    Chloride 100 98 - 107 mmol/L    Carbon Dioxide (CO2) 32 (H) 21 - 31 mmol/L    Anion Gap 7 3 - 14 mmol/L    Urea Nitrogen 13 7 - 25 mg/dL    Creatinine 0.87 0.60 - 1.20 mg/dL    Calcium 9.7 8.6 - 10.3 mg/dL    Glucose 91 70 - 105 mg/dL    GFR Estimate 71 >60 mL/min/1.73m2      Comment:      Effective December 21, 2021 eGFRcr in adults is calculated using the 2021 CKD-EPI creatinine equation which includes age and gender (Tyesha et al., NEJM, DOI: 10.1056/CGNQom9522275)   CBC W PLT No Diff   Result Value Ref Range    WBC Count 6.5 4.0 - 11.0 10e3/uL    RBC Count 4.60 3.80 - 5.20 10e6/uL    Hemoglobin 13.4 11.7 - 15.7 g/dL    Hematocrit 39.3 35.0 - 47.0 %    MCV 85 78 - 100 fL    MCH 29.1 26.5 - 33.0 pg    MCHC 34.1 31.5 - 36.5 g/dL    RDW 12.8 10.0 - 15.0 %    Platelet Count 254 150 - 450 10e3/uL       If you have any questions or concerns, please call the clinic at the number listed above.       Sincerely,      Iván Spring MD

## 2022-10-06 NOTE — NURSING NOTE
"Chief Complaint   Patient presents with     Pre-Op Exam     10-13-22  Dr. Ramiro CHAMPAGNE   [place]  [phone]       Medication reconciliation completed.    FOOD SECURITY SCREENING QUESTIONS:    The next two questions are to help us understand your food security.  If you are feeling you need any assistance in this area, we have resources available to support you today.    Hunger Vital Signs:    Within the past 12 months we worried whether our food would run out before we got money to buy more. Never  Within the past 12 months the food we bought just didn't last and we didn't have money to get more. Never    Initial /70 (BP Location: Right arm, Patient Position: Sitting, Cuff Size: Adult Regular)   Pulse 87   Temp 98.4  F (36.9  C) (Temporal)   Resp 16   Wt 67.9 kg (149 lb 12.8 oz)   LMP  (LMP Unknown)   SpO2 94%   Breastfeeding No   BMI 26.96 kg/m   Estimated body mass index is 26.96 kg/m  as calculated from the following:    Height as of 7/19/22: 1.588 m (5' 2.5\").    Weight as of this encounter: 67.9 kg (149 lb 12.8 oz).       Geetha Cedeno LPN .......  10/6/2022  3:23 PM  "

## 2022-10-06 NOTE — Clinical Note
Please fax note and laboratory results from today as well as EKG from  December 2021 to Dr. Juan Cummins at [phone]

## 2022-10-06 NOTE — PROGRESS NOTES
[facility]  [address]  [place] [address]  Phone: [phone]  Primary Provider: Joan Mckeon  Pre-op Performing Provider: IVÁN DAMON      PREOPERATIVE EVALUATION:  Today's date: 10/6/2022    Afia Medrano is a 71 year old female who presents for a preoperative evaluation.    Surgical Information:  Surgery/Procedure: EGD  Surgery Location: [place]     Surgeon: Dr. Montoya  Surgery Date: 10-13-22  Time of Surgery: TBD  Where patient plans to recover: At home alone  Fax number for surgical facility: [phone]    Type of Anesthesia Anticipated: Local with MAC    Assessment & Plan         ICD-10-CM    1. Preoperative examination  Z01.818 CBC W PLT No Diff     Basic Metabolic Panel     Basic Metabolic Panel     CBC W PLT No Diff   2. Heartburn  R12    3. Sliding hiatal hernia  K44.9    4. Gastritis without bleeding, unspecified chronicity, unspecified gastritis type  K29.70    5. Other dysphagia  R13.19    6. Benign essential hypertension  I10      Preoperative evaluation, heartburn, hiatal hernia, history of gastritis and dysphagia: Patient is scheduled to undergo an endoscopy with Dr. Juan Albarran on October 13th.  She has had an endoscopy before with no complications.  She has no history of clotting or bleeding disorders in her family.  She has no active cardiopulmonary symptoms.  Reviewed her medication list with the patient and she will hold her aspirin for at least 10 days prior to surgery and hold her omeprazole as instructed per her surgeon.  Instructed her to hold her chlorthalidone the morning of the procedure as do not develop hypotension with anesthesia.  Repeat BMP and CBC today.  EKG performed last December will also be faxed to her surgeon.  Patient is medically optimized for surgery      Flu shot given today.    No follow-ups on file.    Iván Damon MD  [facility]      Subjective     HPI related to upcoming  procedure:   History of gastritis and has underwent an endoscopy in the past.  No history of clotting or bleeding disorders in herself or family.  She has not had any complication with anesthesia in the past.  No chest pain or shortness of breath at rest or with exertion.    Preop Questions 12/7/2021   1. Have you ever had a heart attack or stroke? No   2. Have you ever had surgery on your heart or blood vessels, such as a stent placement, a coronary artery bypass, or surgery on an artery in your head, neck, heart, or legs? No   3. Do you have chest pain with activity? No   4. Do you have a history of  heart failure? No   5. Do you currently have a cold, bronchitis or symptoms of other infection? No   6. Do you have a cough, shortness of breath, or wheezing? No   7. Do you or anyone in your family have previous history of blood clots? No   8. Do you or does anyone in your family have a serious bleeding problem such as prolonged bleeding following surgeries or cuts? No   9. Have you ever had problems with anemia or been told to take iron pills? No   10. Have you had any abnormal blood loss such as black, tarry or bloody stools, or abnormal vaginal bleeding? No   11. Have you ever had a blood transfusion? No   12. Are you willing to have a blood transfusion if it is medically needed before, during, or after your surgery? Yes   13. Have you or any of your relatives ever had problems with anesthesia? No   14. Do you have sleep apnea, excessive snoring or daytime drowsiness? No   15. Do you have any artifical heart valves or other implanted medical devices like a pacemaker, defibrillator, or continuous glucose monitor? No   16. Do you have artificial joints? No   17. Are you allergic to latex? No     Health Care Directive:  Patient does not have a Health Care Directive or Living Will:     Preoperative Review of :   reviewed - controlled substances reflected in medication list.          Review of  Systems  Constitutional, neuro, ENT, endocrine, pulmonary, cardiac, gastrointestinal, genitourinary, musculoskeletal, integument and psychiatric systems are negative, except as otherwise noted.    Patient Active Problem List    Diagnosis Date Noted     Gastritis 06/28/2021     Priority: Medium     Sliding hiatal hernia 06/28/2021     Priority: Medium     Other dysphagia 06/22/2021     Priority: Medium     History of basal cell carcinoma 07/21/2020     Priority: Medium     MDD (major depressive disorder) 01/31/2018     Priority: Medium     Benign essential hypertension 02/15/2010     Priority: Medium     Sleep disorder 08/01/2007     Priority: Medium     Sigmoid diverticulosis 02/24/2003     Priority: Medium      Past Medical History:   Diagnosis Date     Basal cell carcinoma of skin of nose     No Comments Provided     Hyperlipidemia     lowered zocor 2015     Other gastritis with bleeding 2020     Other specified disorders of bone density and structure, unspecified site (CODE)     11/2014,DEXA,  t score -1.7, ca/vit d     Recurrent major depressive disorder (H)     No Comments Provided     Sleep disorder     No Comments Provided     Past Surgical History:   Procedure Laterality Date     COLONOSCOPY      2003,diverticulosis     COLONOSCOPY  04/24/2014    normal, repeat in 2024     ESOPHAGOSCOPY, GASTROSCOPY, DUODENOSCOPY (EGD), COMBINED N/A 6/28/2021    Procedure: ESOPHAGOGASTRODUODENOSCOPY, WITH BIOPSY;  Surgeon: Cody Alarcon MD;  Location:  OR     OTHER SURGICAL HISTORY      ONH885,PARTIAL HYSTERECTOMY,secondary to DUB. Ovaries in place. was on HRT, stopped patch in 7/2011     Current Outpatient Medications   Medication Sig Dispense Refill     aspirin 81 MG EC tablet Take 81 mg by mouth daily       Calcium Carb-Cholecalciferol 600-800 MG-UNIT CHEW Take 1 tablet by mouth daily       chlorthalidone (HYGROTON) 25 MG tablet TAKE 0.5-1 TABLETS (12.5-25 MG) BY MOUTH DAILY 90 tablet 0     cholecalciferol 25 MCG (1000  UT) TABS Take 1,000 Units by mouth daily       citalopram (CELEXA) 40 MG tablet TAKE 1 TABLET BY MOUTH EVERYDAY AT BEDTIME 90 tablet 3     fluticasone (FLONASE) 50 MCG/ACT nasal spray USE 1 SPRAY INTO BOTH NOSTRILS AT BEDTIME 48 mL 1     GLUCOSAMINE SULFATE PO Take 1 tablet by mouth daily       ibuprofen (ADVIL/MOTRIN) 800 MG tablet        metroNIDAZOLE (METROGEL) 0.75 % external gel APPLY TO AFFECTED AREA TWICE A DAY 45 g 11     omeprazole (PRILOSEC) 20 MG DR capsule Take 1 capsule (20 mg) by mouth 2 times daily Before a meal 180 capsule 3     oxyCODONE (ROXICODONE) 5 MG tablet        simvastatin (ZOCOR) 10 MG tablet TAKE 1 TABLET BY MOUTH EVERYDAY AT BEDTIME 90 tablet 3     sucralfate (CARAFATE) 1 GM tablet TAKE 1 TABLET (1 G) BY MOUTH 2 TIMES DAILY 180 tablet 3     triamcinolone (KENALOG) 0.1 % cream        zolpidem (AMBIEN) 10 MG tablet TAKE 1 TABLET (10 MG) BY MOUTH NIGHTLY AS NEEDED FOR SLEEP *NOT COVERED, SENT FAX TO DR* 90 tablet 0       Allergies   Allergen Reactions     Seasonal Allergies Hives and Itching        Social History     Tobacco Use     Smoking status: Former Smoker     Packs/day: 1.00     Years: 20.00     Pack years: 20.00     Types: Cigarettes     Quit date:      Years since quittin.7     Smokeless tobacco: Never Used   Substance Use Topics     Alcohol use: No     Family History   Problem Relation Age of Onset     Diabetes Father         Diabetes     Hypertension Father         Hypertension     Heart Disease Father         Heart Disease     Osteoporosis Mother         Osteoporosis,compression fracture     Heart Disease Mother         Heart Disease     Other - See Comments Other         negative breast cancer     Breast Cancer No family hx of         Cancer-breast     History   Drug Use No         Objective     /70 (BP Location: Right arm, Patient Position: Sitting, Cuff Size: Adult Regular)   Pulse 87   Temp 98.4  F (36.9  C) (Temporal)   Resp 16   Wt 67.9 kg (149 lb 12.8  oz)   LMP  (LMP Unknown)   SpO2 94%   Breastfeeding No   BMI 26.96 kg/m      Physical Exam  Vitals reviewed.   Constitutional:       General: She is not in acute distress.     Appearance: She is well-developed. She is not diaphoretic.   HENT:      Head: Normocephalic and atraumatic.      Right Ear: Tympanic membrane and ear canal normal.      Left Ear: Tympanic membrane and ear canal normal.      Mouth/Throat:      Mouth: Mucous membranes are moist.      Pharynx: Oropharynx is clear. No oropharyngeal exudate or posterior oropharyngeal erythema.   Eyes:      General: No scleral icterus.     Conjunctiva/sclera: Conjunctivae normal.   Cardiovascular:      Rate and Rhythm: Normal rate and regular rhythm.   Pulmonary:      Effort: Pulmonary effort is normal.      Breath sounds: Normal breath sounds.   Abdominal:      General: There is no distension.      Palpations: Abdomen is soft.      Tenderness: There is no abdominal tenderness.   Musculoskeletal:         General: No deformity.      Cervical back: Neck supple.   Lymphadenopathy:      Cervical: No cervical adenopathy.   Skin:     General: Skin is warm and dry.      Coloration: Skin is not jaundiced.      Findings: No rash.   Neurological:      Mental Status: She is alert and oriented to person, place, and time. Mental status is at baseline.   Psychiatric:         Mood and Affect: Mood normal.         Behavior: Behavior normal.           Recent Labs   Lab Test 03/23/22  1048 12/09/21  1014 08/26/21  1129 06/15/21  1530   HGB  --  14.2 14.5  --    PLT  --  177 191  --    NA  --   --  142 139   POTASSIUM  --   --  4.0 4.2   CR  --   --  0.94 0.92   A1C 5.5  --   --   --         Diagnostics:  Labs pending at this time.  Results will be reviewed when available.   EKG reviewed and interpreted from December 2021.    Revised Cardiac Risk Index (RCRI):  The patient has the following serious cardiovascular risks for perioperative complications:   - No serious cardiac risks  = 0 points     RCRI Interpretation: 0 points: Class I (very low risk - 0.4% complication rate)           Signed Electronically by: Iván Spring MD  Copy of this evaluation report is provided to requesting physician.

## 2022-10-19 ENCOUNTER — MYC MEDICAL ADVICE (OUTPATIENT)
Dept: FAMILY MEDICINE | Facility: OTHER | Age: 72
End: 2022-10-19

## 2022-10-20 DIAGNOSIS — I10 HYPERTENSION, UNSPECIFIED TYPE: ICD-10-CM

## 2022-10-21 DIAGNOSIS — K29.70 GASTRITIS WITHOUT BLEEDING, UNSPECIFIED CHRONICITY, UNSPECIFIED GASTRITIS TYPE: ICD-10-CM

## 2022-10-21 RX ORDER — CHLORTHALIDONE 25 MG/1
12.5-25 TABLET ORAL DAILY
Qty: 30 TABLET | Refills: 0 | Status: SHIPPED | OUTPATIENT
Start: 2022-10-21 | End: 2022-11-18

## 2022-10-21 NOTE — TELEPHONE ENCOUNTER
"[id number] [employer] [facility] [place] sent Rx request for the following:      Requested Prescriptions   Pending Prescriptions Disp Refills     chlorthalidone (HYGROTON) 25 MG tablet [Pharmacy Med Name: CHLORTHALIDONE 25 MG TABLET] 90 tablet 0     Sig: TAKE 0.5-1 TABLETS (12.5-25 MG) BY MOUTH DAILY       Diuretics (Including Combos) Protocol Failed - 10/20/2022 11:32 AM        Failed - Normal serum potassium on file in past 12 months     Recent Labs   Lab Test 10/06/22  1551   POTASSIUM 3.1*                    Passed - Blood pressure under 140/90 in past 12 months     BP Readings from Last 3 Encounters:   10/06/22 112/70   08/11/22 118/70   07/19/22 112/66                 Passed - Recent (12 mo) or future (30 days) visit within the authorizing provider's specialty     Patient has had an office visit with the authorizing provider or a provider within the authorizing providers department within the previous 12 mos or has a future within next 30 days. See \"Patient Info\" tab in inbasket, or \"Choose Columns\" in Meds & Orders section of the refill encounter.              Passed - Medication is active on med list        Passed - Patient is age 18 or older        Passed - No active pregancy on record        Passed - Normal serum creatinine on file in past 12 months     Recent Labs   Lab Test 10/06/22  1551   CR 0.87              Passed - Normal serum sodium on file in past 12 months     Recent Labs   Lab Test 10/06/22  1551                 Passed - No positive pregnancy test in past 12 months             Last Prescription Date:   09/21/2022  Last Fill Qty/Refills:         90, R-0    Last Office Visit:              10/06/2022  Future Office visit:             Next 5 appointments (look out 90 days)    Nov 18, 2022 10:00 AM  PHYSICAL with Joan Raygoza MD  [facility] ) [address]  [place] [address] [phone] "        Arabella Oneil RN  ....................  10/21/2022   1:10 PM    Patient's PCP is out of the office. Routing to covering provider.    Arabella Oneil RN  ....................  10/21/2022   1:11 PM

## 2022-10-24 NOTE — TELEPHONE ENCOUNTER
Routing refill request to provider for review/approval because:    LOV: 10/6/22  Kelli Siddiqui RN on 10/24/2022 at 3:42 PM

## 2022-10-29 DIAGNOSIS — L71.9 ROSACEA: ICD-10-CM

## 2022-11-01 ENCOUNTER — TELEPHONE (OUTPATIENT)
Dept: FAMILY MEDICINE | Facility: OTHER | Age: 72
End: 2022-11-01

## 2022-11-01 RX ORDER — METRONIDAZOLE 7.5 MG/G
GEL TOPICAL
Qty: 45 G | Refills: 0 | Status: SHIPPED | OUTPATIENT
Start: 2022-11-01 | End: 2022-11-18

## 2022-11-01 NOTE — TELEPHONE ENCOUNTER
Please call the patient. She has a physical coming up and she wants to talk with her PCP first.      Gabrielle Taylor on 11/1/2022 at 10:45 AM

## 2022-11-01 NOTE — TELEPHONE ENCOUNTER
Routing refill request to provider for review/approval because:    OV: 3/23/22  Patient noted to have an appointment on 11/18    Kelli Siddiqui RN on 11/1/2022 at 3:46 PM

## 2022-11-01 NOTE — TELEPHONE ENCOUNTER
Patient states she is seeing a specialist in [place] for acid reflux. She is scheduled for surgery 12/6/22, she has medicare wellness scheduled with [facility] 11/18/22 she is needing a pre-op for the surgery. She is wondering if [facility] can do both at the visit or if needing separate.   Estefanía Hastings LPN .............11/1/2022     4:30 PM

## 2022-11-07 NOTE — TELEPHONE ENCOUNTER
Patient dupdated on Dr. Roman response: Yes that is fine. Patient reported Great. Domonique Davis RN on 11/7/2022 at 9:15 AM

## 2022-11-08 ENCOUNTER — TRANSFERRED RECORDS (OUTPATIENT)
Dept: HEALTH INFORMATION MANAGEMENT | Facility: OTHER | Age: 72
End: 2022-11-08

## 2022-11-18 ENCOUNTER — OFFICE VISIT (OUTPATIENT)
Dept: FAMILY MEDICINE | Facility: OTHER | Age: 72
End: 2022-11-18
Attending: FAMILY MEDICINE
Payer: COMMERCIAL

## 2022-11-18 VITALS
DIASTOLIC BLOOD PRESSURE: 76 MMHG | BODY MASS INDEX: 25.73 KG/M2 | RESPIRATION RATE: 18 BRPM | TEMPERATURE: 97.5 F | OXYGEN SATURATION: 94 % | SYSTOLIC BLOOD PRESSURE: 112 MMHG | HEIGHT: 63 IN | HEART RATE: 77 BPM | WEIGHT: 145.2 LBS

## 2022-11-18 DIAGNOSIS — F33.41 MAJOR DEPRESSIVE DISORDER, RECURRENT EPISODE, IN PARTIAL REMISSION (H): ICD-10-CM

## 2022-11-18 DIAGNOSIS — I10 HYPERTENSION, UNSPECIFIED TYPE: ICD-10-CM

## 2022-11-18 DIAGNOSIS — Z00.00 MEDICARE WELCOME EXAM: Primary | ICD-10-CM

## 2022-11-18 DIAGNOSIS — L71.9 ROSACEA: ICD-10-CM

## 2022-11-18 DIAGNOSIS — K29.70 GASTRITIS WITHOUT BLEEDING, UNSPECIFIED CHRONICITY, UNSPECIFIED GASTRITIS TYPE: ICD-10-CM

## 2022-11-18 DIAGNOSIS — E87.6 HYPOKALEMIA: ICD-10-CM

## 2022-11-18 DIAGNOSIS — G47.9 DISTURBANCE IN SLEEP BEHAVIOR: ICD-10-CM

## 2022-11-18 DIAGNOSIS — R94.31 PROLONGED Q-T INTERVAL ON ECG: ICD-10-CM

## 2022-11-18 DIAGNOSIS — Z12.31 ENCOUNTER FOR SCREENING MAMMOGRAM FOR BREAST CANCER: ICD-10-CM

## 2022-11-18 DIAGNOSIS — E78.2 MIXED DYSLIPIDEMIA: ICD-10-CM

## 2022-11-18 LAB
ALBUMIN SERPL BCG-MCNC: 4.6 G/DL (ref 3.5–5.2)
ALP SERPL-CCNC: 99 U/L (ref 35–104)
ALT SERPL W P-5'-P-CCNC: 14 U/L (ref 10–35)
ANION GAP SERPL CALCULATED.3IONS-SCNC: 10 MMOL/L (ref 7–15)
AST SERPL W P-5'-P-CCNC: 21 U/L (ref 10–35)
ATRIAL RATE - MUSE: 73 BPM
BILIRUB SERPL-MCNC: 0.9 MG/DL
BUN SERPL-MCNC: 15 MG/DL (ref 8–23)
CALCIUM SERPL-MCNC: 9.8 MG/DL (ref 8.8–10.2)
CHLORIDE SERPL-SCNC: 97 MMOL/L (ref 98–107)
CREAT SERPL-MCNC: 0.88 MG/DL (ref 0.51–0.95)
DEPRECATED HCO3 PLAS-SCNC: 32 MMOL/L (ref 22–29)
DIASTOLIC BLOOD PRESSURE - MUSE: NORMAL MMHG
GFR SERPL CREATININE-BSD FRML MDRD: 70 ML/MIN/1.73M2
GLUCOSE SERPL-MCNC: 96 MG/DL (ref 70–99)
INTERPRETATION ECG - MUSE: NORMAL
MAGNESIUM SERPL-MCNC: 2.1 MG/DL (ref 1.7–2.3)
P AXIS - MUSE: 6 DEGREES
POTASSIUM SERPL-SCNC: 3.1 MMOL/L (ref 3.4–5.3)
PR INTERVAL - MUSE: 154 MS
PROT SERPL-MCNC: 7.3 G/DL (ref 6.4–8.3)
QRS DURATION - MUSE: 82 MS
QT - MUSE: 450 MS
QTC - MUSE: 495 MS
R AXIS - MUSE: -12 DEGREES
SODIUM SERPL-SCNC: 139 MMOL/L (ref 136–145)
SYSTOLIC BLOOD PRESSURE - MUSE: NORMAL MMHG
T AXIS - MUSE: 37 DEGREES
VENTRICULAR RATE- MUSE: 73 BPM

## 2022-11-18 PROCEDURE — 93010 ELECTROCARDIOGRAM REPORT: CPT | Performed by: INTERNAL MEDICINE

## 2022-11-18 PROCEDURE — 83735 ASSAY OF MAGNESIUM: CPT | Mod: ZL | Performed by: FAMILY MEDICINE

## 2022-11-18 PROCEDURE — G0439 PPPS, SUBSEQ VISIT: HCPCS | Performed by: FAMILY MEDICINE

## 2022-11-18 PROCEDURE — 93005 ELECTROCARDIOGRAM TRACING: CPT | Performed by: FAMILY MEDICINE

## 2022-11-18 PROCEDURE — G0463 HOSPITAL OUTPT CLINIC VISIT: HCPCS

## 2022-11-18 PROCEDURE — 99214 OFFICE O/P EST MOD 30 MIN: CPT | Mod: 25 | Performed by: FAMILY MEDICINE

## 2022-11-18 PROCEDURE — 36415 COLL VENOUS BLD VENIPUNCTURE: CPT | Mod: ZL | Performed by: FAMILY MEDICINE

## 2022-11-18 PROCEDURE — 80053 COMPREHEN METABOLIC PANEL: CPT | Mod: ZL | Performed by: FAMILY MEDICINE

## 2022-11-18 RX ORDER — CITALOPRAM HYDROBROMIDE 40 MG/1
20 TABLET ORAL AT BEDTIME
Qty: 90 TABLET | Refills: 0 | COMMUNITY
Start: 2022-11-18 | End: 2022-12-01

## 2022-11-18 RX ORDER — SIMVASTATIN 10 MG
TABLET ORAL
Qty: 90 TABLET | Refills: 3 | Status: SHIPPED | OUTPATIENT
Start: 2022-11-18 | End: 2024-01-04

## 2022-11-18 RX ORDER — METRONIDAZOLE 7.5 MG/G
GEL TOPICAL
Qty: 45 G | Refills: 0 | Status: SHIPPED | OUTPATIENT
Start: 2022-11-18

## 2022-11-18 RX ORDER — ZOLPIDEM TARTRATE 10 MG/1
TABLET ORAL
Qty: 90 TABLET | Refills: 3 | Status: SHIPPED | OUTPATIENT
Start: 2022-11-18 | End: 2023-08-17

## 2022-11-18 RX ORDER — CHLORTHALIDONE 25 MG/1
12.5-25 TABLET ORAL DAILY
Qty: 90 TABLET | Refills: 3 | Status: SHIPPED | OUTPATIENT
Start: 2022-11-18 | End: 2023-01-03

## 2022-11-18 RX ORDER — POTASSIUM CHLORIDE 1500 MG/1
20 TABLET, EXTENDED RELEASE ORAL DAILY
Qty: 30 TABLET | Refills: 0 | Status: SHIPPED | OUTPATIENT
Start: 2022-11-18 | End: 2022-12-14

## 2022-11-18 RX ORDER — CITALOPRAM HYDROBROMIDE 40 MG/1
40 TABLET ORAL AT BEDTIME
Qty: 90 TABLET | Refills: 3 | Status: SHIPPED | OUTPATIENT
Start: 2022-11-18 | End: 2022-11-18

## 2022-11-18 ASSESSMENT — ANXIETY QUESTIONNAIRES
6. BECOMING EASILY ANNOYED OR IRRITABLE: NOT AT ALL
7. FEELING AFRAID AS IF SOMETHING AWFUL MIGHT HAPPEN: NOT AT ALL
GAD7 TOTAL SCORE: 0
7. FEELING AFRAID AS IF SOMETHING AWFUL MIGHT HAPPEN: NOT AT ALL
GAD7 TOTAL SCORE: 0
2. NOT BEING ABLE TO STOP OR CONTROL WORRYING: NOT AT ALL
4. TROUBLE RELAXING: NOT AT ALL
8. IF YOU CHECKED OFF ANY PROBLEMS, HOW DIFFICULT HAVE THESE MADE IT FOR YOU TO DO YOUR WORK, TAKE CARE OF THINGS AT HOME, OR GET ALONG WITH OTHER PEOPLE?: NOT DIFFICULT AT ALL
GAD7 TOTAL SCORE: 0
5. BEING SO RESTLESS THAT IT IS HARD TO SIT STILL: NOT AT ALL
1. FEELING NERVOUS, ANXIOUS, OR ON EDGE: NOT AT ALL
IF YOU CHECKED OFF ANY PROBLEMS ON THIS QUESTIONNAIRE, HOW DIFFICULT HAVE THESE PROBLEMS MADE IT FOR YOU TO DO YOUR WORK, TAKE CARE OF THINGS AT HOME, OR GET ALONG WITH OTHER PEOPLE: NOT DIFFICULT AT ALL
3. WORRYING TOO MUCH ABOUT DIFFERENT THINGS: NOT AT ALL

## 2022-11-18 ASSESSMENT — ENCOUNTER SYMPTOMS
ARTHRALGIAS: 0
HEADACHES: 0
ABDOMINAL PAIN: 0
HEARTBURN: 1
CHILLS: 0
SORE THROAT: 1
PARESTHESIAS: 0
NERVOUS/ANXIOUS: 0
CONSTIPATION: 1
DIARRHEA: 0
DYSURIA: 0
COUGH: 0
MYALGIAS: 0
NAUSEA: 1
BREAST MASS: 0
FREQUENCY: 0
FEVER: 0
EYE PAIN: 0
SHORTNESS OF BREATH: 0
DIZZINESS: 0
JOINT SWELLING: 0
HEMATOCHEZIA: 0
WEAKNESS: 1
HEMATURIA: 0
PALPITATIONS: 0

## 2022-11-18 ASSESSMENT — ACTIVITIES OF DAILY LIVING (ADL): CURRENT_FUNCTION: NO ASSISTANCE NEEDED

## 2022-11-18 ASSESSMENT — PATIENT HEALTH QUESTIONNAIRE - PHQ9
10. IF YOU CHECKED OFF ANY PROBLEMS, HOW DIFFICULT HAVE THESE PROBLEMS MADE IT FOR YOU TO DO YOUR WORK, TAKE CARE OF THINGS AT HOME, OR GET ALONG WITH OTHER PEOPLE: NOT DIFFICULT AT ALL
SUM OF ALL RESPONSES TO PHQ QUESTIONS 1-9: 4
SUM OF ALL RESPONSES TO PHQ QUESTIONS 1-9: 4

## 2022-11-18 ASSESSMENT — PAIN SCALES - GENERAL: PAINLEVEL: NO PAIN (0)

## 2022-11-18 NOTE — PROGRESS NOTES
[facility]  [address]  [place] [address]  Phone: [phone]  Fax: [phone]  Primary Provider: Joan Mckeon  Pre-op Performing Provider: JOAN MCKEON      PREOPERATIVE EVALUATION:  Today's date: 11/18/2022    Afia Medrano is a 72 year old female who presents for a preoperative evaluation.    Surgical Information:  Surgery/Procedure: Hernia repair   Surgery Location: [facility]   Surgeon: Dr Valdes  Surgery Date: 12/6/22  Time of Surgery: TBD  Where patient plans to recover: At home with family  Fax number for surgical facility: [phone]    Type of Anesthesia Anticipated: General    Assessment & Plan     The proposed surgical procedure is considered LOW risk.    Hypertension, unspecified type    - chlorthalidone (HYGROTON) 25 MG tablet; Take 0.5-1 tablets (12.5-25 mg) by mouth daily  - EKG 12-lead complete w/read - Clinics  - Comprehensive Metabolic Panel; Future  - Comprehensive Metabolic Panel    Major depressive disorder, recurrent episode, in partial remission (H)    - citalopram (CELEXA) 40 MG tablet; Take 0.5 tablets (20 mg) by mouth At Bedtime      Mixed dyslipidemia    - simvastatin (ZOCOR) 10 MG tablet; TAKE 1 TABLET BY MOUTH EVERYDAY AT BEDTIME    Gastritis without bleeding, unspecified chronicity, unspecified gastritis type    - omeprazole (PRILOSEC) 20 MG DR capsule; Take 1 capsule (20 mg) by mouth 2 times daily Before a meal      Encounter for screening mammogram for breast cancer    - MA Screening Bilateral w/ Aleks; Future      Prolonged Q-T interval on ECG  Patient has new QT prolongation.  Likely gout high-dose SSRI.  Recommend decreasing Celexa to 20 mg daily.  Also has mild hypokalemia which will be replaced.  Will need EKG repeat EKG prior to surgery as well as repeat potassium.  - Magnesium; Future  - Magnesium    Hypokalemia    - potassium chloride ER (KLOR-CON M) 20 MEQ CR tablet; Take 1 tablet (20  mEq) by mouth daily           Risks and Recommendations:  The patient has the following additional risks and recommendations for perioperative complications:          RECOMMENDATION:  Surgery is not recommended until reevaluation for QT prolongation.      Anthony Velazquez MD          Subjective     HPI related to upcoming procedure:     72-year-old female presents for preoperative valuation as well as her Medicare wellness exam.    She is scheduled to have Nissen fundoplication at an outside facility.  Recently discovered to have a large hiatal hernia causing increased acid reflux symptoms.  Currently on Prilosec 20 mg twice daily.  She also uses Carafate as needed.    Patient has history of complicated grief and was on Celexa 40 mg daily.  Recently decreased to 20 mg daily.  Reports her mood is stable.      Preop Questions 12/7/2021   1. Have you ever had a heart attack or stroke? No   2. Have you ever had surgery on your heart or blood vessels, such as a stent placement, a coronary artery bypass, or surgery on an artery in your head, neck, heart, or legs? No   3. Do you have chest pain with activity? No   4. Do you have a history of  heart failure? No   5. Do you currently have a cold, bronchitis or symptoms of other infection? No   6. Do you have a cough, shortness of breath, or wheezing? No   7. Do you or anyone in your family have previous history of blood clots? No   8. Do you or does anyone in your family have a serious bleeding problem such as prolonged bleeding following surgeries or cuts? No   9. Have you ever had problems with anemia or been told to take iron pills? No   10. Have you had any abnormal blood loss such as black, tarry or bloody stools, or abnormal vaginal bleeding? No   11. Have you ever had a blood transfusion? No   12. Are you willing to have a blood transfusion if it is medically needed before, during, or after your surgery? Yes   13. Have you or any of your relatives ever had problems  with anesthesia? No   14. Do you have sleep apnea, excessive snoring or daytime drowsiness? No   15. Do you have any artifical heart valves or other implanted medical devices like a pacemaker, defibrillator, or continuous glucose monitor? No   16. Do you have artificial joints? No   17. Are you allergic to latex? No     Health Care Directive:  Patient does not have a Health Care Directive or Living Will:     Preoperative Review of :   reviewed - no record of controlled substances prescribed.      Status of Chronic Conditions:  HYPERLIPIDEMIA - Patient has a long history of significant Hyperlipidemia requiring medication for treatment with recent good control. Patient reports no problems or side effects with the medication.     HYPERTENSION - Patient has longstanding history of HTN , currently denies any symptoms referable to elevated blood pressure. Specifically denies chest pain, palpitations, dyspnea, orthopnea, PND or peripheral edema. Blood pressure readings have been in normal range. Current medication regimen is as listed below. Patient denies any side effects of medication.       Review of Systems  CONSTITUTIONAL: NEGATIVE for fever, chills, change in weight  INTEGUMENTARY/SKIN: NEGATIVE for worrisome rashes, moles or lesions  EYES: NEGATIVE for vision changes or irritation  ENT/MOUTH: NEGATIVE for ear, mouth and throat problems  RESP: NEGATIVE for significant cough or SOB  CV: NEGATIVE for chest pain, palpitations or peripheral edema  GI: NEGATIVE for nausea, abdominal pain, heartburn, or change in bowel habits  : NEGATIVE for frequency, dysuria, or hematuria  MUSCULOSKELETAL: NEGATIVE for significant arthralgias or myalgia  NEURO: NEGATIVE for weakness, dizziness or paresthesias  ENDOCRINE: NEGATIVE for temperature intolerance, skin/hair changes  HEME: NEGATIVE for bleeding problems  PSYCHIATRIC: NEGATIVE for changes in mood or affect    Patient Active Problem List    Diagnosis Date Noted      Gastritis 06/28/2021     Priority: Medium     Sliding hiatal hernia 06/28/2021     Priority: Medium     Other dysphagia 06/22/2021     Priority: Medium     History of basal cell carcinoma 07/21/2020     Priority: Medium     MDD (major depressive disorder) 01/31/2018     Priority: Medium     Benign essential hypertension 02/15/2010     Priority: Medium     Sleep disorder 08/01/2007     Priority: Medium     Sigmoid diverticulosis 02/24/2003     Priority: Medium      Past Medical History:   Diagnosis Date     Basal cell carcinoma of skin of nose     No Comments Provided     Hyperlipidemia     lowered zocor 2015     Other gastritis with bleeding 2020     Other specified disorders of bone density and structure, unspecified site (CODE)     11/2014,DEXA,  t score -1.7, ca/vit d     Recurrent major depressive disorder (H)     No Comments Provided     Sleep disorder     No Comments Provided     Past Surgical History:   Procedure Laterality Date     COLONOSCOPY      2003,diverticulosis     COLONOSCOPY  04/24/2014    normal, repeat in 2024     ESOPHAGOSCOPY, GASTROSCOPY, DUODENOSCOPY (EGD), COMBINED N/A 6/28/2021    Procedure: ESOPHAGOGASTRODUODENOSCOPY, WITH BIOPSY;  Surgeon: Cody Alarcon MD;  Location: [facility] OR     OTHER SURGICAL HISTORY      [id number],PARTIAL HYSTERECTOMY,secondary to DUB. Ovaries in place. was on HRT, stopped patch in 7/2011     Current Outpatient Medications   Medication Sig Dispense Refill     aspirin 81 MG EC tablet Take 81 mg by mouth daily       Calcium Carb-Cholecalciferol 600-800 MG-UNIT CHEW Take 1 tablet by mouth daily       chlorthalidone (HYGROTON) 25 MG tablet TAKE 0.5-1 TABLETS (12.5-25 MG) BY MOUTH DAILY 30 tablet 0     cholecalciferol 25 MCG (1000 UT) TABS Take 1,000 Units by mouth daily       citalopram (CELEXA) 40 MG tablet TAKE 1 TABLET BY MOUTH EVERYDAY AT BEDTIME 90 tablet 3     fluticasone (FLONASE) 50 MCG/ACT nasal spray USE 1 SPRAY INTO BOTH NOSTRILS AT BEDTIME 48 mL 1     GLUCOSAMINE  "SULFATE PO Take 1 tablet by mouth daily       metroNIDAZOLE (METROGEL) 0.75 % external gel APPLY TO AFFECTED AREA TWICE A DAY 45 g 0     omeprazole (PRILOSEC) 20 MG DR capsule TAKE 1 CAPSULE (20 MG) BY MOUTH 2 TIMES DAILY BEFORE A MEAL 180 capsule 0     simvastatin (ZOCOR) 10 MG tablet TAKE 1 TABLET BY MOUTH EVERYDAY AT BEDTIME 90 tablet 3     triamcinolone (KENALOG) 0.1 % cream        zolpidem (AMBIEN) 10 MG tablet TAKE 1 TABLET (10 MG) BY MOUTH NIGHTLY AS NEEDED FOR SLEEP *NOT COVERED, SENT FAX TO * 90 tablet 0     ibuprofen (ADVIL/MOTRIN) 800 MG tablet  (Patient not taking: Reported on 2022)       oxyCODONE (ROXICODONE) 5 MG tablet  (Patient not taking: Reported on 2022)       sucralfate (CARAFATE) 1 GM tablet TAKE 1 TABLET (1 G) BY MOUTH 2 TIMES DAILY 180 tablet 3       Allergies   Allergen Reactions     Seasonal Allergies Hives and Itching        Social History     Tobacco Use     Smoking status: Former     Packs/day: 1.00     Years: 20.00     Pack years: 20.00     Types: Cigarettes     Quit date:      Years since quittin.8     Smokeless tobacco: Never   Substance Use Topics     Alcohol use: No     Family History   Problem Relation Age of Onset     Diabetes Father         Diabetes     Hypertension Father         Hypertension     Heart Disease Father         Heart Disease     Osteoporosis Mother         Osteoporosis,compression fracture     Heart Disease Mother         Heart Disease     Other - See Comments Other         negative breast cancer     Breast Cancer No family hx of         Cancer-breast     History   Drug Use No         Objective     /76   Pulse 77   Temp 97.5  F (36.4  C) (Tympanic)   Resp 18   Ht 1.588 m (5' 2.5\")   Wt 65.9 kg (145 lb 3.2 oz)   LMP  (LMP Unknown)   SpO2 94%   Breastfeeding No   BMI 26.13 kg/m      Physical Exam    GENERAL APPEARANCE: healthy, alert and no distress     HENT: ear canals and TM's normal and nose and mouth without ulcers or " lesions     NECK: no adenopathy, no asymmetry, masses, or scars and thyroid normal to palpation     RESP: lungs clear to auscultation - no rales, rhonchi or wheezes     CV: regular rates and rhythm, normal S1 S2, no S3 or S4 and no murmur, click or rub     ABDOMEN:  soft, nontender, no HSM or masses and bowel sounds normal     SKIN: no suspicious lesions or rashes     NEURO: Normal strength and tone, sensory exam grossly normal, mentation intact and speech normal     PSYCH: mentation appears normal. and affect normal/bright     LYMPHATICS: No cervical adenopathy    Recent Labs   Lab Test 10/06/22  1551 03/23/22  1048 12/09/21  1014 08/26/21  1129   HGB 13.4  --  14.2 14.5     --  177 191     --   --  142   POTASSIUM 3.1*  --   --  4.0   CR 0.87  --   --  0.94   A1C  --  5.5  --   --         Diagnostics:  Recent Results (from the past 168 hour(s))   Comprehensive Metabolic Panel    Collection Time: 11/18/22 11:04 AM   Result Value Ref Range    Sodium 139 136 - 145 mmol/L    Potassium 3.1 (L) 3.4 - 5.3 mmol/L    Chloride 97 (L) 98 - 107 mmol/L    Carbon Dioxide (CO2) 32 (H) 22 - 29 mmol/L    Anion Gap 10 7 - 15 mmol/L    Urea Nitrogen 15.0 8.0 - 23.0 mg/dL    Creatinine 0.88 0.51 - 0.95 mg/dL    Calcium 9.8 8.8 - 10.2 mg/dL    Glucose 96 70 - 99 mg/dL    Alkaline Phosphatase 99 35 - 104 U/L    AST 21 10 - 35 U/L    ALT 14 10 - 35 U/L    Protein Total 7.3 6.4 - 8.3 g/dL    Albumin 4.6 3.5 - 5.2 g/dL    Bilirubin Total 0.9 <=1.2 mg/dL    GFR Estimate 70 >60 mL/min/1.73m2   Magnesium    Collection Time: 11/18/22 11:04 AM   Result Value Ref Range    Magnesium 2.1 1.7 - 2.3 mg/dL   EKG 12-lead complete w/read - Clinics    Collection Time: 11/18/22 11:09 AM   Result Value Ref Range    Systolic Blood Pressure  mmHg    Diastolic Blood Pressure  mmHg    Ventricular Rate 73 BPM    Atrial Rate 73 BPM    NJ Interval 154 ms    QRS Duration 82 ms     ms    QTc 495 ms    P Axis 6 degrees    R AXIS -12 degrees    T  Axis 37 degrees    Interpretation ECG       Sinus rhythm  Nonspecific ST abnormality  Prolonged QT  Abnormal ECG  When compared with ECG of 07-DEC-2021 12:49,  QTc has lengthened  Confirmed by MD FERNANDA, [facility] ([id number]) on 11/18/2022 11:50:46 AM        EKG: appears normal, NSR, prolonged QT, normal axis, normal intervals, no acute ST/T changes c/w ischemia, no LVH by voltage criteria, unchanged from previous tracings    Revised Cardiac Risk Index (RCRI):  The patient has the following serious cardiovascular risks for perioperative complications:   - No serious cardiac risks = 0 points     RCRI Interpretation: 0 points: Class I (very low risk - 0.4% complication rate)           Signed Electronically by: Joan Velazquez MD  Copy of this evaluation report is provided to requesting physician.

## 2022-11-18 NOTE — PROGRESS NOTES
"3SUBJECTIVE:   Mary is a 72 year old who presents for Preventive Visit.        Patient has been advised of split billing requirements and indicates understanding: Yes  Are you in the first 12 months of your Medicare coverage?  No    Healthy Habits:     In general, how would you rate your overall health?  Good    Frequency of exercise:  2-3 days/week    Duration of exercise:  N/A    Do you usually eat at least 4 servings of fruit and vegetables a day, include whole grains    & fiber and avoid regularly eating high fat or \"junk\" foods?  No    Taking medications regularly:  Yes    Medication side effects:  None    Ability to successfully perform activities of daily living:  No assistance needed    Home Safety:  No safety concerns identified    Hearing Impairment:  No hearing concerns    In the past 6 months, have you been bothered by leaking of urine?  No    In general, how would you rate your overall mental or emotional health?  Good      PHQ-2 Total Score: 0    Additional concerns today:  No      Have you ever done Advance Care Planning? (For example, a Health Directive, POLST, or a discussion with a medical provider or your loved ones about your wishes): No, advance care planning information given to patient to review.  Patient plans to discuss their wishes with loved ones or provider.         Fall risk      Cognitive Screening   1) Repeat 3 items (Leader, Season, Table)    2) Clock draw: ABNORMAL incorrect hands  3) 3 item recall: Recalls 3 objects  Results: ABNORMAL clock, 1-2 items recalled: PROBABLE COGNITIVE IMPAIRMENT, **INFORM PROVIDER**    Mini-CogTM Copyright BIANCA Dubois. Licensed by the author for use in [facility]; reprinted with permission (yi@.[facility]). All rights reserved.      Do you have sleep apnea, excessive snoring or daytime drowsiness?: no    Reviewed and updated as needed this visit by clinical staff   Tobacco  Allergies  Meds      Soc Hx        Reviewed and updated as needed this " visit by Provider                 Social History     Tobacco Use     Smoking status: Former     Packs/day: 1.00     Years: 20.00     Pack years: 20.00     Types: Cigarettes     Quit date:      Years since quittin.8     Smokeless tobacco: Never   Substance Use Topics     Alcohol use: No         Alcohol Use 2022   Prescreen: >3 drinks/day or >7 drinks/week? Not Applicable       Hyperlipidemia Follow-Up      Are you regularly taking any medication or supplement to lower your cholesterol?   Yes- statin    Are you having muscle aches or other side effects that you think could be caused by your cholesterol lowering medication?  No    Hypertension Follow-up      Do you check your blood pressure regularly outside of the clinic? Yes     Are you following a low salt diet? Yes    Are your blood pressures ever more than 140 on the top number (systolic) OR more   than 90 on the bottom number (diastolic), for example 140/90? Yes      Current providers sharing in care for this patient include:   Patient Care Team:  Joan Mckeon MD as PCP - General (Family Practice)  Joan Mckeon MD as Assigned PCP  Marie Siddiqui MD as Assigned OBGYN Provider    The following health maintenance items are reviewed in Epic and correct as of today:  Health Maintenance   Topic Date Due     ZOSTER IMMUNIZATION (2 of 3) 2014     MEDICARE ANNUAL WELLNESS VISIT  2021     DTAP/TDAP/TD IMMUNIZATION (3 - Td or Tdap) 2022     MAMMO SCREENING  2022     PHQ-9  2023     FALL RISK ASSESSMENT  2023     COLORECTAL CANCER SCREENING  2024     ADVANCE CARE PLANNING  2025     LIPID  2027     DEXA  2033     HEPATITIS C SCREENING  Completed     DEPRESSION ACTION PLAN  Completed     INFLUENZA VACCINE  Completed     Pneumococcal Vaccine: 65+ Years  Completed     COVID-19 Vaccine  Completed     IPV IMMUNIZATION  Aged Out     MENINGITIS IMMUNIZATION  Aged Out      BP Readings from Last 3 Encounters:   22 112/76   10/06/22 112/70   22 118/70    Wt Readings from Last 3 Encounters:   22 65.9 kg (145 lb 3.2 oz)   10/06/22 67.9 kg (149 lb 12.8 oz)   22 69.7 kg (153 lb 9.6 oz)                  Patient Active Problem List   Diagnosis     Benign essential hypertension     MDD (major depressive disorder)     Sigmoid diverticulosis     Sleep disorder     History of basal cell carcinoma     Other dysphagia     Gastritis     Sliding hiatal hernia     Past Surgical History:   Procedure Laterality Date     COLONOSCOPY      ,diverticulosis     COLONOSCOPY  2014    normal, repeat in      ESOPHAGOSCOPY, GASTROSCOPY, DUODENOSCOPY (EGD), COMBINED N/A 2021    Procedure: ESOPHAGOGASTRODUODENOSCOPY, WITH BIOPSY;  Surgeon: Cody Alarcon MD;  Location: [facility] OR     OTHER SURGICAL HISTORY      VAQ722,PARTIAL HYSTERECTOMY,secondary to DUB. Ovaries in place. was on HRT, stopped patch in 2011       Social History     Tobacco Use     Smoking status: Former     Packs/day: 1.00     Years: 20.00     Pack years: 20.00     Types: Cigarettes     Quit date:      Years since quittin.9     Smokeless tobacco: Never   Substance Use Topics     Alcohol use: No     Family History   Problem Relation Age of Onset     Diabetes Father         Diabetes     Hypertension Father         Hypertension     Heart Disease Father         Heart Disease     Osteoporosis Mother         Osteoporosis,compression fracture     Heart Disease Mother         Heart Disease     Other - See Comments Other         negative breast cancer     Breast Cancer No family hx of         Cancer-breast         Current Outpatient Medications   Medication Sig Dispense Refill     aspirin 81 MG EC tablet Take 81 mg by mouth daily       Calcium Carb-Cholecalciferol 600-800 MG-UNIT CHEW Take 1 tablet by mouth daily       chlorthalidone (HYGROTON) 25 MG tablet Take 0.5-1 tablets (12.5-25 mg) by mouth daily 90  tablet 3     cholecalciferol 25 MCG (1000 UT) TABS Take 1,000 Units by mouth daily       citalopram (CELEXA) 40 MG tablet Take 0.5 tablets (20 mg) by mouth At Bedtime 90 tablet 0     fluticasone (FLONASE) 50 MCG/ACT nasal spray USE 1 SPRAY INTO BOTH NOSTRILS AT BEDTIME 48 mL 1     GLUCOSAMINE SULFATE PO Take 1 tablet by mouth daily       metroNIDAZOLE (METROGEL) 0.75 % external gel APPLY TO AFFECTED AREA TWICE A DAY Strength: 0.75 % 45 g 0     omeprazole (PRILOSEC) 20 MG DR capsule Take 1 capsule (20 mg) by mouth 2 times daily Before a meal 180 capsule 0     potassium chloride ER (KLOR-CON M) 20 MEQ CR tablet Take 1 tablet (20 mEq) by mouth daily 30 tablet 0     simvastatin (ZOCOR) 10 MG tablet TAKE 1 TABLET BY MOUTH EVERYDAY AT BEDTIME 90 tablet 3     triamcinolone (KENALOG) 0.1 % cream        zolpidem (AMBIEN) 10 MG tablet TAKE 1 TABLET (10 MG) BY MOUTH NIGHTLY AS NEEDED FOR SLEEP *NOT COVERED, SENT FAX TO DR* Strength: 10 mg 90 tablet 3     sucralfate (CARAFATE) 1 GM tablet TAKE 1 TABLET (1 G) BY MOUTH 2 TIMES DAILY 180 tablet 3     Pneumonia Vaccine:utd  Mammogram Screening: Mammogram Screening: Recommended mammography every 1-2 years with patient discussion and risk factor consideration        Review of Systems   Constitutional: Negative for chills and fever.   HENT: Positive for sore throat. Negative for congestion, ear pain and hearing loss.    Eyes: Negative for pain and visual disturbance.   Respiratory: Negative for cough and shortness of breath.    Cardiovascular: Negative for chest pain, palpitations and peripheral edema.   Gastrointestinal: Positive for constipation, heartburn and nausea. Negative for abdominal pain, diarrhea and hematochezia.   Breasts:  Negative for tenderness, breast mass and discharge.   Genitourinary: Negative for dysuria, frequency, genital sores, hematuria, pelvic pain, urgency, vaginal bleeding and vaginal discharge.   Musculoskeletal: Negative for arthralgias, joint swelling  "and myalgias.   Skin: Negative for rash.   Neurological: Positive for weakness. Negative for dizziness, headaches and paresthesias.   Psychiatric/Behavioral: Negative for mood changes. The patient is not nervous/anxious.          OBJECTIVE:   /76   Pulse 77   Temp 97.5  F (36.4  C) (Tympanic)   Resp 18   Ht 1.588 m (5' 2.5\")   Wt 65.9 kg (145 lb 3.2 oz)   LMP  (LMP Unknown)   SpO2 94%   Breastfeeding No   BMI 26.13 kg/m   Estimated body mass index is 26.13 kg/m  as calculated from the following:    Height as of this encounter: 1.588 m (5' 2.5\").    Weight as of this encounter: 65.9 kg (145 lb 3.2 oz).  Physical Exam  Cardiovascular:      Rate and Rhythm: Normal rate and regular rhythm.      Heart sounds: No murmur heard.  Pulmonary:      Effort: Pulmonary effort is normal. No respiratory distress.      Breath sounds: No wheezing.   Abdominal:      General: Abdomen is flat.      Tenderness: There is no abdominal tenderness.   Musculoskeletal:         General: Normal range of motion.      Cervical back: Normal range of motion. No rigidity.   Lymphadenopathy:      Cervical: No cervical adenopathy.   Skin:     General: Skin is warm.   Neurological:      General: No focal deficit present.      Mental Status: She is alert.   Psychiatric:         Mood and Affect: Mood normal.         Judgment: Judgment normal.           Diagnostic Test Results:  Labs reviewed in Epic  Results for orders placed or performed in visit on 11/18/22   Comprehensive Metabolic Panel     Status: Abnormal   Result Value Ref Range    Sodium 139 136 - 145 mmol/L    Potassium 3.1 (L) 3.4 - 5.3 mmol/L    Chloride 97 (L) 98 - 107 mmol/L    Carbon Dioxide (CO2) 32 (H) 22 - 29 mmol/L    Anion Gap 10 7 - 15 mmol/L    Urea Nitrogen 15.0 8.0 - 23.0 mg/dL    Creatinine 0.88 0.51 - 0.95 mg/dL    Calcium 9.8 8.8 - 10.2 mg/dL    Glucose 96 70 - 99 mg/dL    Alkaline Phosphatase 99 35 - 104 U/L    AST 21 10 - 35 U/L    ALT 14 10 - 35 U/L    Protein " Total 7.3 6.4 - 8.3 g/dL    Albumin 4.6 3.5 - 5.2 g/dL    Bilirubin Total 0.9 <=1.2 mg/dL    GFR Estimate 70 >60 mL/min/1.73m2   Magnesium     Status: Normal   Result Value Ref Range    Magnesium 2.1 1.7 - 2.3 mg/dL   EKG 12-lead complete w/read - Clinics     Status: None   Result Value Ref Range    Systolic Blood Pressure  mmHg    Diastolic Blood Pressure  mmHg    Ventricular Rate 73 BPM    Atrial Rate 73 BPM    VT Interval 154 ms    QRS Duration 82 ms     ms    QTc 495 ms    P Axis 6 degrees    R AXIS -12 degrees    T Axis 37 degrees    Interpretation ECG       Sinus rhythm  Nonspecific ST abnormality  Prolonged QT  Abnormal ECG  When compared with ECG of 07-DEC-2021 12:49,  QTc has lengthened  Confirmed by MD FERNANDA, [facility] ([id number]) on 11/18/2022 11:50:46 AM         ASSESSMENT / PLAN:       ICD-10-CM    1. Medicare welcome exam  Z00.00       2. Hypertension, unspecified type  I10 chlorthalidone (HYGROTON) 25 MG tablet     EKG 12-lead complete w/read - Clinics     Comprehensive Metabolic Panel     Comprehensive Metabolic Panel      3. Major depressive disorder, recurrent episode, in partial remission (H)  F33.41 citalopram (CELEXA) 40 MG tablet     DISCONTINUED: citalopram (CELEXA) 40 MG tablet      4. Rosacea  L71.9 metroNIDAZOLE (METROGEL) 0.75 % external gel      5. Mixed dyslipidemia  E78.2 simvastatin (ZOCOR) 10 MG tablet      6. Gastritis without bleeding, unspecified chronicity, unspecified gastritis type  K29.70 omeprazole (PRILOSEC) 20 MG DR capsule      7. Disturbance in sleep behavior  G47.9 zolpidem (AMBIEN) 10 MG tablet      8. Encounter for screening mammogram for breast cancer  Z12.31 MA Screening Bilateral w/ Aleks      9. Prolonged Q-T interval on ECG  R94.31 Magnesium     Magnesium      10. Hypokalemia  E87.6 potassium chloride ER (KLOR-CON M) 20 MEQ CR tablet        Patient was also seen for preoperative valuation.  EKG showed QT prolongation likely due to high-dose SSRI and hypokalemia.   "These will be addressed and she will need a repeat EKG and lab work prior to.  Preventive cares were also addressed.  Please see orders.  Chronic medical conditions are stable and refills were provided.    Patient has been advised of split billing requirements and indicates understanding: Yes      COUNSELING:  Reviewed preventive health counseling, as reflected in patient instructions  Special attention given to:       Regular exercise       Healthy diet/nutrition       Vision screening       Hearing screening       Dental care       Bladder control       Fall risk prevention       Osteoporosis prevention/bone health       Colon cancer screening       Advanced Planning       BMI:   Estimated body mass index is 26.13 kg/m  as calculated from the following:    Height as of this encounter: 1.588 m (5' 2.5\").    Weight as of this encounter: 65.9 kg (145 lb 3.2 oz).   Weight management plan: Discussed healthy diet and exercise guidelines      She reports that she quit smoking about 23 years ago. Her smoking use included cigarettes. She has a 20.00 pack-year smoking history. She has never used smokeless tobacco.      Appropriate preventive services were discussed with this patient, including applicable screening as appropriate for cardiovascular disease, diabetes, osteopenia/osteoporosis, and glaucoma.  As appropriate for age/gender, discussed screening for colorectal cancer, prostate cancer, breast cancer, and cervical cancer. Checklist reviewing preventive services available has been given to the patient.    Reviewed patients plan of care and provided an AVS. The Basic Care Plan (routine screening as documented in Health Maintenance) for Afia meets the Care Plan requirement. This Care Plan has been established and reviewed with the Patient.          Joan Velazquez MD  [facility]  Review current opioid prescriptions    For a patient with a current opioid prescription:    Reviewed potential " Opioid Use Disorder (OUD) risk factors:      Evaluate their pain severity and current treatment plan:     Provide information on non-opioid treatment options:    Refer to a specialist, as appropriate:    Get more information on pain management in the [facility] Pain Management Best Practices Inter-agency Task Force Report    Screen for potential Substance Use Disorders (SUDs)    Reviewed the patient s potential risk factors for SUDs:      Refer to treatment or specialist, as appropriate:     A screening tool isn t required but you may use one:  Find more information in the National [place] on Drug Abuse Screening and Assessment Tools Chart    Identified Health Risks:  Answers for HPI/ROS submitted by the patient on 11/18/2022  If you checked off any problems, how difficult have these problems made it for you to do your work, take care of things at home, or get along with other people?: Not difficult at all  PHQ9 TOTAL SCORE: 4  ALMA 7 TOTAL SCORE: 0

## 2022-12-01 ENCOUNTER — OFFICE VISIT (OUTPATIENT)
Dept: FAMILY MEDICINE | Facility: OTHER | Age: 72
End: 2022-12-01
Attending: FAMILY MEDICINE
Payer: COMMERCIAL

## 2022-12-01 VITALS
RESPIRATION RATE: 18 BRPM | HEART RATE: 76 BPM | WEIGHT: 149.2 LBS | TEMPERATURE: 98.6 F | BODY MASS INDEX: 26.85 KG/M2 | SYSTOLIC BLOOD PRESSURE: 110 MMHG | OXYGEN SATURATION: 96 % | DIASTOLIC BLOOD PRESSURE: 74 MMHG

## 2022-12-01 DIAGNOSIS — E87.6 HYPOKALEMIA: ICD-10-CM

## 2022-12-01 DIAGNOSIS — F33.41 MAJOR DEPRESSIVE DISORDER, RECURRENT EPISODE, IN PARTIAL REMISSION (H): ICD-10-CM

## 2022-12-01 DIAGNOSIS — K29.70 GASTRITIS WITHOUT BLEEDING, UNSPECIFIED CHRONICITY, UNSPECIFIED GASTRITIS TYPE: ICD-10-CM

## 2022-12-01 DIAGNOSIS — R94.31 PROLONGED QT INTERVAL: Primary | ICD-10-CM

## 2022-12-01 LAB
ANION GAP SERPL CALCULATED.3IONS-SCNC: 10 MMOL/L (ref 7–15)
BUN SERPL-MCNC: 16.4 MG/DL (ref 8–23)
CALCIUM SERPL-MCNC: 9.3 MG/DL (ref 8.8–10.2)
CHLORIDE SERPL-SCNC: 100 MMOL/L (ref 98–107)
CREAT SERPL-MCNC: 0.86 MG/DL (ref 0.51–0.95)
DEPRECATED HCO3 PLAS-SCNC: 28 MMOL/L (ref 22–29)
GFR SERPL CREATININE-BSD FRML MDRD: 72 ML/MIN/1.73M2
GLUCOSE SERPL-MCNC: 92 MG/DL (ref 70–99)
POTASSIUM SERPL-SCNC: 3.5 MMOL/L (ref 3.4–5.3)
SODIUM SERPL-SCNC: 138 MMOL/L (ref 136–145)

## 2022-12-01 PROCEDURE — 99213 OFFICE O/P EST LOW 20 MIN: CPT | Performed by: FAMILY MEDICINE

## 2022-12-01 PROCEDURE — G0463 HOSPITAL OUTPT CLINIC VISIT: HCPCS | Mod: 25

## 2022-12-01 PROCEDURE — G0463 HOSPITAL OUTPT CLINIC VISIT: HCPCS

## 2022-12-01 PROCEDURE — 93005 ELECTROCARDIOGRAM TRACING: CPT | Performed by: FAMILY MEDICINE

## 2022-12-01 PROCEDURE — 93010 ELECTROCARDIOGRAM REPORT: CPT | Performed by: STUDENT IN AN ORGANIZED HEALTH CARE EDUCATION/TRAINING PROGRAM

## 2022-12-01 PROCEDURE — 82310 ASSAY OF CALCIUM: CPT | Mod: ZL | Performed by: FAMILY MEDICINE

## 2022-12-01 PROCEDURE — 36415 COLL VENOUS BLD VENIPUNCTURE: CPT | Mod: ZL | Performed by: FAMILY MEDICINE

## 2022-12-01 RX ORDER — POTASSIUM CHLORIDE 1500 MG/1
20 TABLET, EXTENDED RELEASE ORAL DAILY
Qty: 30 TABLET | Status: CANCELLED | OUTPATIENT
Start: 2022-12-01

## 2022-12-01 RX ORDER — CITALOPRAM HYDROBROMIDE 40 MG/1
20 TABLET ORAL AT BEDTIME
Qty: 90 TABLET | Refills: 3 | Status: SHIPPED | OUTPATIENT
Start: 2022-12-01 | End: 2023-01-03

## 2022-12-01 ASSESSMENT — PAIN SCALES - GENERAL: PAINLEVEL: NO PAIN (0)

## 2022-12-01 NOTE — NURSING NOTE
Patient is here to follow up on medication, EKG and labs.       No LMP recorded (lmp unknown). Patient has had a hysterectomy.  Medication Reconciliation: complete      Estefanía Hastings LPN 12/1/2022 2:45 PM       Advance care directive on file? no  Advance care directive provided to patient? no       Estefanía Hastings LPN

## 2022-12-01 NOTE — PROGRESS NOTES
Assessment & Plan     Prolonged QT interval  Resolved with potassium replacement and decreased dosage of selective serotonin reuptake inhibitor.    May proceed with srugery  - EKG 12-lead complete w/read - Clinics  - Basic Metabolic Panel; Future  - Basic Metabolic Panel    Gastritis without bleeding, unspecified chronicity, unspecified gastritis type    - omeprazole (PRILOSEC) 20 MG DR capsule; Take 1 capsule (20 mg) by mouth 2 times daily Before a meal    Hypokalemia    resolved  Major depressive disorder, recurrent episode, in partial remission (H)  Continue weaning  - citalopram (CELEXA) 40 MG tablet; Take 0.5 tablets (20 mg) by mouth At Bedtime                 No follow-ups on file.    Joan Velazquez MD  [facility]   Mary is a 72 year old, presenting for the following health issues:    Patient presents for updated preoperative valuation.  At her last preop she was found to have prolonged QT interval.  She was found to be hypokalemic and had been taking high-dose SSRI.  She was started on potassium replacement and decreased SSRI.      Follow Up (EKG and labs)      History of Present Illness       Vascular Disease:  She presents for follow up of vascular disease.  She never takes nitroglycerin. She takes daily aspirin.    She eats 0-1 servings of fruits and vegetables daily.She exercises with enough effort to increase her heart rate 10 to 19 minutes per day.    She is taking medications regularly.             Review of Systems         Objective    /74   Pulse 76   Temp 98.6  F (37  C) (Tympanic)   Resp 18   Wt 67.7 kg (149 lb 3.2 oz)   LMP  (LMP Unknown)   SpO2 96%   Breastfeeding No   BMI 26.85 kg/m    Body mass index is 26.85 kg/m .  Physical Exam  Cardiovascular:      Rate and Rhythm: Normal rate and regular rhythm.   Pulmonary:      Effort: Pulmonary effort is normal.   Neurological:      Mental Status: She is alert.            EKG - Reviewed and  interpreted by me appears normal, NSR, normal axis, normal intervals, no acute ST/T changes c/w ischemia, no LVH by voltage criteria, unchanged from previous tracings    Results for orders placed or performed in visit on 12/01/22   Basic Metabolic Panel     Status: Normal   Result Value Ref Range    Sodium 138 136 - 145 mmol/L    Potassium 3.5 3.4 - 5.3 mmol/L    Chloride 100 98 - 107 mmol/L    Carbon Dioxide (CO2) 28 22 - 29 mmol/L    Anion Gap 10 7 - 15 mmol/L    Urea Nitrogen 16.4 8.0 - 23.0 mg/dL    Creatinine 0.86 0.51 - 0.95 mg/dL    Calcium 9.3 8.8 - 10.2 mg/dL    Glucose 92 70 - 99 mg/dL    GFR Estimate 72 >60 mL/min/1.73m2   EKG 12-lead complete w/read - Clinics     Status: None   Result Value Ref Range    Systolic Blood Pressure  mmHg    Diastolic Blood Pressure  mmHg    Ventricular Rate 72 BPM    Atrial Rate 72 BPM    WV Interval 148 ms    QRS Duration 80 ms     ms    QTc 455 ms    P Axis 21 degrees    R AXIS -13 degrees    T Axis 19 degrees    Interpretation ECG       Sinus rhythm with Premature atrial complexes  Otherwise normal ECG  When compared with ECG of 18-NOV-2022 11:09,  Premature atrial complexes are now Present  Confirmed by Iván Spring ([id number]) on 12/5/2022 3:04:47 PM

## 2022-12-05 LAB
ATRIAL RATE - MUSE: 72 BPM
DIASTOLIC BLOOD PRESSURE - MUSE: NORMAL MMHG
INTERPRETATION ECG - MUSE: NORMAL
P AXIS - MUSE: 21 DEGREES
PR INTERVAL - MUSE: 148 MS
QRS DURATION - MUSE: 80 MS
QT - MUSE: 416 MS
QTC - MUSE: 455 MS
R AXIS - MUSE: -13 DEGREES
SYSTOLIC BLOOD PRESSURE - MUSE: NORMAL MMHG
T AXIS - MUSE: 19 DEGREES
VENTRICULAR RATE- MUSE: 72 BPM

## 2022-12-06 ENCOUNTER — TRANSFERRED RECORDS (OUTPATIENT)
Dept: HEALTH INFORMATION MANAGEMENT | Facility: OTHER | Age: 72
End: 2022-12-06

## 2022-12-10 DIAGNOSIS — E87.6 HYPOKALEMIA: ICD-10-CM

## 2022-12-13 ENCOUNTER — TRANSFERRED RECORDS (OUTPATIENT)
Dept: HEALTH INFORMATION MANAGEMENT | Facility: OTHER | Age: 72
End: 2022-12-13

## 2022-12-14 NOTE — TELEPHONE ENCOUNTER
[id number] [employer] [facility] [place] sent Rx request for the following:      Requested Prescriptions   Pending Prescriptions Disp Refills     KLOR-CON 20 MEQ CR tablet [Pharmacy Med Name: KLOR-CON M20 TABLET] 30 tablet 0     Sig: TAKE 1 TABLET BY MOUTH DAILY   Last Prescription Date:   11/18/22  Last Fill Qty/Refills:         30, R-0    Last Office Visit:              12/1/22   Future Office visit:           None    Ivette Gold RN .............. 12/14/2022  12:39 PM

## 2022-12-21 RX ORDER — POTASSIUM CHLORIDE 1500 MG/1
TABLET, EXTENDED RELEASE ORAL
Qty: 90 TABLET | Refills: 2 | Status: SHIPPED | OUTPATIENT
Start: 2022-12-21 | End: 2024-01-04

## 2022-12-22 ENCOUNTER — TELEPHONE (OUTPATIENT)
Dept: FAMILY MEDICINE | Facility: OTHER | Age: 72
End: 2022-12-22

## 2022-12-22 NOTE — TELEPHONE ENCOUNTER
Patient states that she is [place] [facility].  The facility preforming the procedure stated it was up to patient's physician to decide if she needed to have another preop.  Patient is scheduled for a short visit on 01/03/2022, and would like to know if it is needed for her to have the appointment.    Dayanara Hassan on 12/22/2022 at 11:08 AM'  
Patient was notified.    Maria Eugenia Dubois LPN on 12/22/2022 at 2:51 PM    
We can address at this visit  Anthony Velazquez MD    
Left arm;

## 2023-01-03 ENCOUNTER — OFFICE VISIT (OUTPATIENT)
Dept: FAMILY MEDICINE | Facility: OTHER | Age: 73
End: 2023-01-03
Attending: FAMILY MEDICINE
Payer: COMMERCIAL

## 2023-01-03 VITALS
SYSTOLIC BLOOD PRESSURE: 112 MMHG | DIASTOLIC BLOOD PRESSURE: 76 MMHG | WEIGHT: 143.4 LBS | HEART RATE: 69 BPM | BODY MASS INDEX: 25.41 KG/M2 | HEIGHT: 63 IN | RESPIRATION RATE: 18 BRPM | OXYGEN SATURATION: 98 % | TEMPERATURE: 97.5 F

## 2023-01-03 DIAGNOSIS — Z98.890 STATUS POST LAPAROSCOPIC NISSEN FUNDOPLICATION: ICD-10-CM

## 2023-01-03 DIAGNOSIS — R94.31 PROLONGED Q-T INTERVAL ON ECG: ICD-10-CM

## 2023-01-03 DIAGNOSIS — F33.41 MAJOR DEPRESSIVE DISORDER, RECURRENT EPISODE, IN PARTIAL REMISSION (H): Primary | ICD-10-CM

## 2023-01-03 DIAGNOSIS — E87.6 HYPOKALEMIA: ICD-10-CM

## 2023-01-03 LAB
ANION GAP SERPL CALCULATED.3IONS-SCNC: 7 MMOL/L (ref 7–15)
ATRIAL RATE - MUSE: 64 BPM
BUN SERPL-MCNC: 11.1 MG/DL (ref 8–23)
CALCIUM SERPL-MCNC: 9.8 MG/DL (ref 8.8–10.2)
CHLORIDE SERPL-SCNC: 101 MMOL/L (ref 98–107)
CREAT SERPL-MCNC: 0.86 MG/DL (ref 0.51–0.95)
DEPRECATED HCO3 PLAS-SCNC: 31 MMOL/L (ref 22–29)
DIASTOLIC BLOOD PRESSURE - MUSE: NORMAL MMHG
GFR SERPL CREATININE-BSD FRML MDRD: 72 ML/MIN/1.73M2
GLUCOSE SERPL-MCNC: 92 MG/DL (ref 70–99)
INTERPRETATION ECG - MUSE: NORMAL
P AXIS - MUSE: 4 DEGREES
POTASSIUM SERPL-SCNC: 3.7 MMOL/L (ref 3.4–5.3)
PR INTERVAL - MUSE: 152 MS
QRS DURATION - MUSE: 80 MS
QT - MUSE: 432 MS
QTC - MUSE: 445 MS
R AXIS - MUSE: -10 DEGREES
SODIUM SERPL-SCNC: 139 MMOL/L (ref 136–145)
SYSTOLIC BLOOD PRESSURE - MUSE: NORMAL MMHG
T AXIS - MUSE: 27 DEGREES
VENTRICULAR RATE- MUSE: 64 BPM

## 2023-01-03 PROCEDURE — 99214 OFFICE O/P EST MOD 30 MIN: CPT | Performed by: FAMILY MEDICINE

## 2023-01-03 PROCEDURE — 93005 ELECTROCARDIOGRAM TRACING: CPT | Performed by: FAMILY MEDICINE

## 2023-01-03 PROCEDURE — G0463 HOSPITAL OUTPT CLINIC VISIT: HCPCS | Mod: 25

## 2023-01-03 PROCEDURE — 93010 ELECTROCARDIOGRAM REPORT: CPT | Performed by: STUDENT IN AN ORGANIZED HEALTH CARE EDUCATION/TRAINING PROGRAM

## 2023-01-03 PROCEDURE — 36415 COLL VENOUS BLD VENIPUNCTURE: CPT | Mod: ZL | Performed by: FAMILY MEDICINE

## 2023-01-03 PROCEDURE — 80048 BASIC METABOLIC PNL TOTAL CA: CPT | Mod: ZL | Performed by: FAMILY MEDICINE

## 2023-01-03 PROCEDURE — G0463 HOSPITAL OUTPT CLINIC VISIT: HCPCS

## 2023-01-03 RX ORDER — CITALOPRAM HYDROBROMIDE 20 MG/1
20 TABLET ORAL AT BEDTIME
Qty: 90 TABLET | Refills: 3 | Status: SHIPPED | OUTPATIENT
Start: 2023-01-03 | End: 2024-01-04

## 2023-01-03 ASSESSMENT — ANXIETY QUESTIONNAIRES
1. FEELING NERVOUS, ANXIOUS, OR ON EDGE: NOT AT ALL
4. TROUBLE RELAXING: NOT AT ALL
3. WORRYING TOO MUCH ABOUT DIFFERENT THINGS: SEVERAL DAYS
GAD7 TOTAL SCORE: 2
7. FEELING AFRAID AS IF SOMETHING AWFUL MIGHT HAPPEN: NOT AT ALL
GAD7 TOTAL SCORE: 2
8. IF YOU CHECKED OFF ANY PROBLEMS, HOW DIFFICULT HAVE THESE MADE IT FOR YOU TO DO YOUR WORK, TAKE CARE OF THINGS AT HOME, OR GET ALONG WITH OTHER PEOPLE?: SOMEWHAT DIFFICULT
2. NOT BEING ABLE TO STOP OR CONTROL WORRYING: NOT AT ALL
6. BECOMING EASILY ANNOYED OR IRRITABLE: SEVERAL DAYS
7. FEELING AFRAID AS IF SOMETHING AWFUL MIGHT HAPPEN: NOT AT ALL
IF YOU CHECKED OFF ANY PROBLEMS ON THIS QUESTIONNAIRE, HOW DIFFICULT HAVE THESE PROBLEMS MADE IT FOR YOU TO DO YOUR WORK, TAKE CARE OF THINGS AT HOME, OR GET ALONG WITH OTHER PEOPLE: SOMEWHAT DIFFICULT
GAD7 TOTAL SCORE: 2
5. BEING SO RESTLESS THAT IT IS HARD TO SIT STILL: NOT AT ALL

## 2023-01-03 ASSESSMENT — PATIENT HEALTH QUESTIONNAIRE - PHQ9
10. IF YOU CHECKED OFF ANY PROBLEMS, HOW DIFFICULT HAVE THESE PROBLEMS MADE IT FOR YOU TO DO YOUR WORK, TAKE CARE OF THINGS AT HOME, OR GET ALONG WITH OTHER PEOPLE: SOMEWHAT DIFFICULT
SUM OF ALL RESPONSES TO PHQ QUESTIONS 1-9: 8
SUM OF ALL RESPONSES TO PHQ QUESTIONS 1-9: 8

## 2023-01-03 ASSESSMENT — PAIN SCALES - GENERAL: PAINLEVEL: NO PAIN (0)

## 2023-01-03 NOTE — NURSING NOTE
Patient is here for a pre-op for EGD, at [facility] with Dr Valdes.    No LMP recorded (lmp unknown). Patient has had a hysterectomy.  Medication Reconciliation: complete      Estefanía Hastings LPN 1/3/2023 8:53 AM       Advance care directive on file? no  Advance care directive provided to patient? no       Estefanía Hastings LPN

## 2023-01-03 NOTE — PROGRESS NOTES
[facility]  [address]  [place] [address]  Phone: [phone]  Fax: [phone]  Primary Provider: Joan Mckeon  Pre-op Performing Provider: JOAN MCKEON      PREOPERATIVE EVALUATION:  Today's date: 1/3/2023    Afia Medrano is a 72 year old female who presents for a preoperative evaluation.    Surgical Information:  Surgery/Procedure: EGD  Surgery Location: [facility]   Surgeon: Keisha  Surgery Date: 1/17/23  Time of Surgery: TBD  Where patient plans to recover: At home with family  Fax number for surgical facility:     Type of Anesthesia Anticipated: Local with MAC    Assessment & Plan     The proposed surgical procedure is considered LOW risk.    Major depressive disorder, recurrent episode, in partial remission (H)  Increased depressive symptoms.  Recommend increase escitalopram to 20 mg daily.  She experienced QT prolongation when she was taking citalopram at 40 mg she also had hypokalemia at that time both of those have resolved.  - citalopram (CELEXA) 20 MG tablet; Take 1 tablet (20 mg) by mouth At Bedtime    Prolonged Q-T interval on ECG  Repeat EKG was normal  - Basic Metabolic Panel; Future  - EKG 12-lead complete w/read - Clinics  - Basic Metabolic Panel    Hypokalemia  Resolved  - Basic Metabolic Panel; Future  - Basic Metabolic Panel    Status post laparoscopic Nissen fundoplication  Cleared to undergo repeat EGD as scheduled.             Risks and Recommendations:  The patient has the following additional risks and recommendations for perioperative complications:   - No identified additional risk factors other than previously addressed        RECOMMENDATION:  APPROVAL GIVEN to proceed with proposed procedure, without further diagnostic evaluation.            Subjective     HPI related to upcoming procedure:     72-year-old female presents for recheck on chronic medical problems.  Recently underwent Nissen  fundoplication.  Has had some troubles with swallowing but she thinks it is getting better.  She is scheduled for repeat EGD and needs preoperative clearance.    She was found to have mild QT prolongation on her initial preop.  This is likely related to hypokalemia and citalopram at 40 mg daily.  She decreased her dose back to 10 mg.  Unfortunately she is experiencing increased depressive symptoms.    Preop Questions 1/3/2023   1. Have you ever had a heart attack or stroke? No   2. Have you ever had surgery on your heart or blood vessels, such as a stent placement, a coronary artery bypass, or surgery on an artery in your head, neck, heart, or legs? No   3. Do you have chest pain with activity? No   4. Do you have a history of  heart failure? No   5. Do you currently have a cold, bronchitis or symptoms of other infection? No   6. Do you have a cough, shortness of breath, or wheezing? No   7. Do you or anyone in your family have previous history of blood clots? No   8. Do you or does anyone in your family have a serious bleeding problem such as prolonged bleeding following surgeries or cuts? No   9. Have you ever had problems with anemia or been told to take iron pills? No   10. Have you had any abnormal blood loss such as black, tarry or bloody stools, or abnormal vaginal bleeding? No   11. Have you ever had a blood transfusion? No   12. Are you willing to have a blood transfusion if it is medically needed before, during, or after your surgery? Yes   13. Have you or any of your relatives ever had problems with anesthesia? No   14. Do you have sleep apnea, excessive snoring or daytime drowsiness? No   15. Do you have any artifical heart valves or other implanted medical devices like a pacemaker, defibrillator, or continuous glucose monitor? No   16. Do you have artificial joints? No   17. Are you allergic to latex? No       Health Care Directive:  Patient does not have a Health Care Directive or Living Will:      Preoperative Review of :      Review of Systems  CONSTITUTIONAL: NEGATIVE for fever, chills, change in weight  ENT/MOUTH: NEGATIVE for ear, mouth and throat problems  RESP: NEGATIVE for significant cough or SOB  CV: NEGATIVE for chest pain, palpitations or peripheral edema    Patient Active Problem List    Diagnosis Date Noted     Gastritis 06/28/2021     Priority: Medium     Sliding hiatal hernia 06/28/2021     Priority: Medium     Other dysphagia 06/22/2021     Priority: Medium     History of basal cell carcinoma 07/21/2020     Priority: Medium     MDD (major depressive disorder) 01/31/2018     Priority: Medium     Benign essential hypertension 02/15/2010     Priority: Medium     Sleep disorder 08/01/2007     Priority: Medium     Sigmoid diverticulosis 02/24/2003     Priority: Medium      Past Medical History:   Diagnosis Date     Basal cell carcinoma of skin of nose     No Comments Provided     Hyperlipidemia     lowered zocor 2015     Other gastritis with bleeding 2020     Other specified disorders of bone density and structure, unspecified site (CODE)     11/2014,DEXA,  t score -1.7, ca/vit d     Recurrent major depressive disorder (H)     No Comments Provided     Sleep disorder     No Comments Provided     Past Surgical History:   Procedure Laterality Date     COLONOSCOPY      2003,diverticulosis     COLONOSCOPY  04/24/2014    normal, repeat in 2024     ESOPHAGOSCOPY, GASTROSCOPY, DUODENOSCOPY (EGD), COMBINED N/A 6/28/2021    Procedure: ESOPHAGOGASTRODUODENOSCOPY, WITH BIOPSY;  Surgeon: Cody Alarcon MD;  Location:  OR     OTHER SURGICAL HISTORY      GOX113,PARTIAL HYSTERECTOMY,secondary to DUB. Ovaries in place. was on HRT, stopped patch in 7/2011     Current Outpatient Medications   Medication Sig Dispense Refill     aspirin 81 MG EC tablet Take 81 mg by mouth daily       Calcium Carb-Cholecalciferol 600-800 MG-UNIT CHEW Take 1 tablet by mouth daily       chlorthalidone (HYGROTON) 25 MG tablet Take  "0.5-1 tablets (12.5-25 mg) by mouth daily 90 tablet 3     cholecalciferol 25 MCG (1000 UT) TABS Take 1,000 Units by mouth daily       citalopram (CELEXA) 40 MG tablet Take 0.5 tablets (20 mg) by mouth At Bedtime 90 tablet 3     fluticasone (FLONASE) 50 MCG/ACT nasal spray USE 1 SPRAY INTO BOTH NOSTRILS AT BEDTIME 48 mL 1     GLUCOSAMINE SULFATE PO Take 1 tablet by mouth daily       metroNIDAZOLE (METROGEL) 0.75 % external gel APPLY TO AFFECTED AREA TWICE A DAY Strength: 0.75 % 45 g 0     omeprazole (PRILOSEC) 20 MG DR capsule Take 1 capsule (20 mg) by mouth 2 times daily Before a meal (Patient taking differently: Take 20 mg by mouth as needed Before a meal) 180 capsule 3     simvastatin (ZOCOR) 10 MG tablet TAKE 1 TABLET BY MOUTH EVERYDAY AT BEDTIME 90 tablet 3     triamcinolone (KENALOG) 0.1 % cream        zolpidem (AMBIEN) 10 MG tablet TAKE 1 TABLET (10 MG) BY MOUTH NIGHTLY AS NEEDED FOR SLEEP *NOT COVERED, SENT FAX TO DR* Strength: 10 mg 90 tablet 3     KLOR-CON 20 MEQ CR tablet TAKE 1 TABLET BY MOUTH DAILY (Patient not taking: Reported on 1/3/2023) 90 tablet 2     sucralfate (CARAFATE) 1 GM tablet TAKE 1 TABLET (1 G) BY MOUTH 2 TIMES DAILY 180 tablet 3       Allergies   Allergen Reactions     Seasonal Allergies Hives and Itching        Social History     Tobacco Use     Smoking status: Former     Packs/day: 1.00     Years: 20.00     Pack years: 20.00     Types: Cigarettes     Quit date:      Years since quittin.0     Smokeless tobacco: Never   Substance Use Topics     Alcohol use: No       History   Drug Use No         Objective     /76   Pulse 69   Temp 97.5  F (36.4  C) (Tympanic)   Resp 18   Ht 1.588 m (5' 2.5\")   Wt 65 kg (143 lb 6.4 oz)   LMP  (LMP Unknown)   SpO2 98%   Breastfeeding No   BMI 25.81 kg/m      Physical Exam  GENERAL APPEARANCE: healthy, alert and no distress  HENT: ear canals and TM's normal and nose and mouth without ulcers or lesions  RESP: lungs clear to " auscultation - no rales, rhonchi or wheezes  CV: regular rate and rhythm, normal S1 S2, no S3 or S4 and no murmur, click or rub   ABDOMEN: soft, nontender, no HSM or masses and bowel sounds normal  NEURO: Normal strength and tone, sensory exam grossly normal, mentation intact and speech normal    Recent Labs   Lab Test 12/01/22  1459 11/18/22  1104 10/06/22  1551 03/23/22  1048 12/09/21  1014   HGB  --   --  13.4  --  14.2   PLT  --   --  254  --  177    139 139  --   --    POTASSIUM 3.5 3.1* 3.1*  --   --    CR 0.86 0.88 0.87  --   --    A1C  --   --   --  5.5  --         Diagnostics:  Recent Results (from the past 24 hour(s))   Basic Metabolic Panel    Collection Time: 01/03/23  9:23 AM   Result Value Ref Range    Sodium 139 136 - 145 mmol/L    Potassium 3.7 3.4 - 5.3 mmol/L    Chloride 101 98 - 107 mmol/L    Carbon Dioxide (CO2) 31 (H) 22 - 29 mmol/L    Anion Gap 7 7 - 15 mmol/L    Urea Nitrogen 11.1 8.0 - 23.0 mg/dL    Creatinine 0.86 0.51 - 0.95 mg/dL    Calcium 9.8 8.8 - 10.2 mg/dL    Glucose 92 70 - 99 mg/dL    GFR Estimate 72 >60 mL/min/1.73m2   EKG 12-lead complete w/read - Clinics    Collection Time: 01/03/23  9:30 AM   Result Value Ref Range    Systolic Blood Pressure  mmHg    Diastolic Blood Pressure  mmHg    Ventricular Rate 64 BPM    Atrial Rate 64 BPM    IN Interval 152 ms    QRS Duration 80 ms     ms    QTc 445 ms    P Axis 4 degrees    R AXIS -10 degrees    T Axis 27 degrees    Interpretation ECG       Sinus rhythm  Normal ECG  When compared with ECG of 01-DEC-2022 15:19,  Premature atrial complexes are no longer Present  Confirmed by Iván Spring ([id number]) on 1/3/2023 10:00:48 AM        EKG: appears normal, NSR, normal axis, normal intervals, no acute ST/T changes c/w ischemia, no LVH by voltage criteria, unchanged from previous tracings    Revised Cardiac Risk Index (RCRI):  The patient has the following serious cardiovascular risks for perioperative complications:   - No serious  cardiac risks = 0 points     RCRI Interpretation: 0 points: Class I (very low risk - 0.4% complication rate)           Signed Electronically by: Joan Velazquez MD  Copy of this evaluation report is provided to requesting physician.      Answers for HPI/ROS submitted by the patient on 1/3/2023  If you checked off any problems, how difficult have these problems made it for you to do your work, take care of things at home, or get along with other people?: Somewhat difficult  PHQ9 TOTAL SCORE: 8

## 2023-01-03 NOTE — PATIENT INSTRUCTIONS
Stop chlorothalidone  Celexa 20 mg daily, decrease to 10 mg in the spring if able  Repeat labs and ekg today

## 2023-01-24 ENCOUNTER — TRANSFERRED RECORDS (OUTPATIENT)
Dept: HEALTH INFORMATION MANAGEMENT | Facility: OTHER | Age: 73
End: 2023-01-24
Payer: COMMERCIAL

## 2023-01-30 ENCOUNTER — HOSPITAL ENCOUNTER (OUTPATIENT)
Dept: MAMMOGRAPHY | Facility: OTHER | Age: 73
Discharge: HOME | End: 2023-01-30
Attending: FAMILY MEDICINE | Admitting: FAMILY MEDICINE
Payer: MEDICARE

## 2023-01-30 DIAGNOSIS — Z12.31 ENCOUNTER FOR SCREENING MAMMOGRAM FOR BREAST CANCER: ICD-10-CM

## 2023-01-30 PROCEDURE — 77067 SCR MAMMO BI INCL CAD: CPT

## 2023-07-13 ENCOUNTER — TELEPHONE (OUTPATIENT)
Dept: FAMILY MEDICINE | Facility: OTHER | Age: 73
End: 2023-07-13
Payer: COMMERCIAL

## 2023-07-13 ENCOUNTER — NURSE TRIAGE (OUTPATIENT)
Dept: FAMILY MEDICINE | Facility: OTHER | Age: 73
End: 2023-07-13
Payer: COMMERCIAL

## 2023-07-13 NOTE — TELEPHONE ENCOUNTER
Reason for call: Patient wanting a work in appointment.    Is the appointment for a Hospital Follow up?  NO     Patient is having the following symptoms:  Fainted and hit bridge of nose, still having nausea and headaches for 2 day    The patient is requesting an appointment with  [facility]    Was an appointment offered for this call? No     Preferred method for responding to this message: Telephone Call    Phone number patient can be reached at? Cell number on file:    Telephone Information:   Mobile [phone]       If we can't reach you directly, may we leave a detailed response at the number you provided? Yes    Can this message wait until your PCP/provider returns if unavailable today? Yes    Arianna Virgen on 7/13/2023 at 12:20 PM

## 2023-07-13 NOTE — TELEPHONE ENCOUNTER
Patient called and stated she fainted in the bathroom Tuesday night around 9PM. Patient stated she hit the bridge of her nose and has a gash. Patient stated she doesn't feel well including nausea and headaches.    Workin message sent back to [facility]  
S-(situation): Fainting.    B-(background): Patient fainted Tuesday night and cut her nose.    A-(assessment):  Patient complains of headache and nausea.    R-(recommendations): Patient to be seen in clinic today. Patient will head to [facility] for evaluation.      Uyen Mercedes RN on 7/13/2023 at 12:24 PM        Reason for Disposition    Fainted (passed out)    All other patients, and now alert and feels fine  (Exception: SIMPLE FAINT due to stress, pain, prolonged standing, or suddenly standing.)    Protocols used: FALLS AND [id number], [id number]      
FREE:[LAST:[YOUR PMD this week],PHONE:[(   )    -],FAX:[(   )    -]]

## 2023-07-13 NOTE — TELEPHONE ENCOUNTER
Patient notified. Offered and scheduled appt for tomorrow at 1040.   Estefanía Hastings LPN .............7/13/2023     1:38 PM

## 2023-07-13 NOTE — TELEPHONE ENCOUNTER
Is [facility] ok with patient having same day tomorrow or should she see another provider.   Estefanía Hastings LPN .............7/13/2023     1:24 PM

## 2023-07-14 ENCOUNTER — OFFICE VISIT (OUTPATIENT)
Dept: FAMILY MEDICINE | Facility: OTHER | Age: 73
End: 2023-07-14
Attending: FAMILY MEDICINE
Payer: COMMERCIAL

## 2023-07-14 VITALS
BODY MASS INDEX: 27.43 KG/M2 | SYSTOLIC BLOOD PRESSURE: 118 MMHG | HEART RATE: 76 BPM | WEIGHT: 152.4 LBS | OXYGEN SATURATION: 95 % | TEMPERATURE: 98.6 F | DIASTOLIC BLOOD PRESSURE: 84 MMHG | RESPIRATION RATE: 18 BRPM

## 2023-07-14 DIAGNOSIS — R94.31 QT PROLONGATION: Primary | ICD-10-CM

## 2023-07-14 DIAGNOSIS — S06.0X0A CONCUSSION WITHOUT LOSS OF CONSCIOUSNESS, INITIAL ENCOUNTER: ICD-10-CM

## 2023-07-14 DIAGNOSIS — R55 SYNCOPE, UNSPECIFIED SYNCOPE TYPE: ICD-10-CM

## 2023-07-14 LAB
ANION GAP SERPL CALCULATED.3IONS-SCNC: 9 MMOL/L (ref 7–15)
ATRIAL RATE - MUSE: 71 BPM
BASOPHILS # BLD AUTO: 0.1 10E3/UL (ref 0–0.2)
BASOPHILS NFR BLD AUTO: 1 %
BUN SERPL-MCNC: 8.9 MG/DL (ref 8–23)
CALCIUM SERPL-MCNC: 9.1 MG/DL (ref 8.8–10.2)
CHLORIDE SERPL-SCNC: 101 MMOL/L (ref 98–107)
CREAT SERPL-MCNC: 0.83 MG/DL (ref 0.51–0.95)
DEPRECATED HCO3 PLAS-SCNC: 30 MMOL/L (ref 22–29)
DIASTOLIC BLOOD PRESSURE - MUSE: NORMAL MMHG
EOSINOPHIL # BLD AUTO: 0.1 10E3/UL (ref 0–0.7)
EOSINOPHIL NFR BLD AUTO: 2 %
ERYTHROCYTE [DISTWIDTH] IN BLOOD BY AUTOMATED COUNT: 12.5 % (ref 10–15)
GFR SERPL CREATININE-BSD FRML MDRD: 75 ML/MIN/1.73M2
GLUCOSE SERPL-MCNC: 92 MG/DL (ref 70–99)
HCT VFR BLD AUTO: 41.9 % (ref 35–47)
HGB BLD-MCNC: 14.4 G/DL (ref 11.7–15.7)
HOLD SPECIMEN: NORMAL
IMM GRANULOCYTES # BLD: 0 10E3/UL
IMM GRANULOCYTES NFR BLD: 0 %
INTERPRETATION ECG - MUSE: NORMAL
LYMPHOCYTES # BLD AUTO: 1.4 10E3/UL (ref 0.8–5.3)
LYMPHOCYTES NFR BLD AUTO: 27 %
MCH RBC QN AUTO: 30.1 PG (ref 26.5–33)
MCHC RBC AUTO-ENTMCNC: 34.4 G/DL (ref 31.5–36.5)
MCV RBC AUTO: 88 FL (ref 78–100)
MONOCYTES # BLD AUTO: 0.5 10E3/UL (ref 0–1.3)
MONOCYTES NFR BLD AUTO: 9 %
NEUTROPHILS # BLD AUTO: 3.2 10E3/UL (ref 1.6–8.3)
NEUTROPHILS NFR BLD AUTO: 61 %
NRBC # BLD AUTO: 0 10E3/UL
NRBC BLD AUTO-RTO: 0 /100
P AXIS - MUSE: 5 DEGREES
PLATELET # BLD AUTO: 180 10E3/UL (ref 150–450)
POTASSIUM SERPL-SCNC: 3.6 MMOL/L (ref 3.4–5.3)
PR INTERVAL - MUSE: 140 MS
QRS DURATION - MUSE: 84 MS
QT - MUSE: 420 MS
QTC - MUSE: 456 MS
R AXIS - MUSE: -8 DEGREES
RBC # BLD AUTO: 4.79 10E6/UL (ref 3.8–5.2)
SODIUM SERPL-SCNC: 140 MMOL/L (ref 136–145)
SYSTOLIC BLOOD PRESSURE - MUSE: NORMAL MMHG
T AXIS - MUSE: 27 DEGREES
VENTRICULAR RATE- MUSE: 71 BPM
WBC # BLD AUTO: 5.2 10E3/UL (ref 4–11)

## 2023-07-14 PROCEDURE — 99214 OFFICE O/P EST MOD 30 MIN: CPT | Performed by: FAMILY MEDICINE

## 2023-07-14 PROCEDURE — 93005 ELECTROCARDIOGRAM TRACING: CPT | Performed by: FAMILY MEDICINE

## 2023-07-14 PROCEDURE — 93010 ELECTROCARDIOGRAM REPORT: CPT | Performed by: INTERNAL MEDICINE

## 2023-07-14 PROCEDURE — 36415 COLL VENOUS BLD VENIPUNCTURE: CPT | Mod: ZL | Performed by: FAMILY MEDICINE

## 2023-07-14 PROCEDURE — G0463 HOSPITAL OUTPT CLINIC VISIT: HCPCS | Mod: 25,27

## 2023-07-14 PROCEDURE — 80048 BASIC METABOLIC PNL TOTAL CA: CPT | Mod: ZL | Performed by: FAMILY MEDICINE

## 2023-07-14 PROCEDURE — 85025 COMPLETE CBC W/AUTO DIFF WBC: CPT | Mod: ZL | Performed by: FAMILY MEDICINE

## 2023-07-14 PROCEDURE — G0463 HOSPITAL OUTPT CLINIC VISIT: HCPCS

## 2023-07-14 ASSESSMENT — PAIN SCALES - GENERAL: PAINLEVEL: NO PAIN (0)

## 2023-07-14 NOTE — PROGRESS NOTES
"  Assessment & Plan     QT prolongation  Repeat EKG was normal.  - EKG 12-lead complete w/read - Clinics    Syncope, unspecified syncope type  Description is consistent with vasovagal episode.  Labs are reassuring.  Blood pressures have been trending on the low end.  Recommend she hold for now increase fluids over the weekend and update me next week.  - Basic Metabolic Panel; Future  - CBC and Differential; Future  - Basic Metabolic Panel  - CBC and Differential    Concussion without loss of consciousness, initial encounter  Recommend rest, fluids, avoid eyestrain.  Head injury occurred 4 days ago and she has no focal neurological symptoms or findings on exam.  Do not feel imaging of her brain is indicated.  Certainly if she develops new symptoms she should contact me.  Patient Instructions   Suspect vasovagal syncope  Hold Blood pressure medication for now, update me with BP readings next week  Increase fluids         BMI:   Estimated body mass index is 27.43 kg/m  as calculated from the following:    Height as of 1/3/23: 1.588 m (5' 2.5\").    Weight as of this encounter: 69.1 kg (152 lb 6.4 oz).           No follow-ups on file.    Joan Velazquez MD  [facility] AND \[facility] [place] [facility]\""    Natividad Hernandez is a 72 year old, presenting for the following health issues:  Follow Up (Fall )  Nursing Notes:   Estefanía Hastings LPN  7/14/2023 11:19 AM  Signed  Patient is here to follow up from recent fall on Tuesday night. States she fainted, did hit nose.  Patient declined questions.     No LMP recorded (lmp unknown). Patient has had a hysterectomy.  Medication Reconciliation: complete      Estefanía Hastings LPN 7/14/2023 10:55 AM       Advance care directive on file? no  Advance care directive provided to patient? no       Estefanía Hastings LPN      HPI     Pleasant 72-year-old female presents after a fall that occurred 4 days ago.  She was going the bathroom, sitting on the toilet when she started to feel faint.  " She fell forward and hit her nose on the edge of the bathtub.  She is uncertain if she had a loss of consciousness but if she did it was for a few seconds.  She has had a similar episode when defecating as well.  This was many years ago.    She continues to have a mild headache and feels unsteady.  No visual changes.  No focal neurological findings.  No nausea vomiting.    History of QT prolongation related to high-dose SSRIs.          Review of Systems   Constitutional, HEENT, cardiovascular, pulmonary, gi and gu systems are negative, except as otherwise noted.      Objective    /84   Pulse 76   Temp 98.6  F (37  C) (Tympanic)   Resp 18   Wt 69.1 kg (152 lb 6.4 oz)   LMP  (LMP Unknown)   SpO2 95%   Breastfeeding No   BMI 27.43 kg/m    Body mass index is 27.43 kg/m .  Physical Exam   GENERAL: healthy, alert and no distress  EYES: Eyes grossly normal to inspection, PERRL and conjunctivae and sclerae normal  HENT: ear canals and TM's normal, nose and mouth without ulcers or lesions  NECK: no adenopathy, no asymmetry, masses, or scars and thyroid normal to palpation  RESP: lungs clear to auscultation - no rales, rhonchi or wheezes  CV: regular rate and rhythm, normal S1 S2, no S3 or S4, no murmur, click or rub, no peripheral edema and peripheral pulses strong  SKIN: Small bruise on the bridge of her nose.  NEURO: Normal strength and tone, mentation intact and speech normal    Results for orders placed or performed in visit on 07/14/23   Basic Metabolic Panel     Status: Abnormal   Result Value Ref Range    Sodium 140 136 - 145 mmol/L    Potassium 3.6 3.4 - 5.3 mmol/L    Chloride 101 98 - 107 mmol/L    Carbon Dioxide (CO2) 30 (H) 22 - 29 mmol/L    Anion Gap 9 7 - 15 mmol/L    Urea Nitrogen 8.9 8.0 - 23.0 mg/dL    Creatinine 0.83 0.51 - 0.95 mg/dL    Calcium 9.1 8.8 - 10.2 mg/dL    Glucose 92 70 - 99 mg/dL    GFR Estimate 75 >60 mL/min/1.73m2   Extra Tube     Status: None    Narrative    The following  orders were created for panel order Extra Tube.  Procedure                               Abnormality         Status                     ---------                               -----------         ------                     Extra Serum Separator Tu...[[id number]]                      Final result                 Please view results for these tests on the individual orders.   CBC with platelets and differential     Status: None   Result Value Ref Range    WBC Count 5.2 4.0 - 11.0 10e3/uL    RBC Count 4.79 3.80 - 5.20 10e6/uL    Hemoglobin 14.4 11.7 - 15.7 g/dL    Hematocrit 41.9 35.0 - 47.0 %    MCV 88 78 - 100 fL    MCH 30.1 26.5 - 33.0 pg    MCHC 34.4 31.5 - 36.5 g/dL    RDW 12.5 10.0 - 15.0 %    Platelet Count 180 150 - 450 10e3/uL    % Neutrophils 61 %    % Lymphocytes 27 %    % Monocytes 9 %    % Eosinophils 2 %    % Basophils 1 %    % Immature Granulocytes 0 %    NRBCs per 100 WBC 0 <1 /100    Absolute Neutrophils 3.2 1.6 - 8.3 10e3/uL    Absolute Lymphocytes 1.4 0.8 - 5.3 10e3/uL    Absolute Monocytes 0.5 0.0 - 1.3 10e3/uL    Absolute Eosinophils 0.1 0.0 - 0.7 10e3/uL    Absolute Basophils 0.1 0.0 - 0.2 10e3/uL    Absolute Immature Granulocytes 0.0 <=0.4 10e3/uL    Absolute NRBCs 0.0 10e3/uL   Extra Serum Separator Tube (SST)     Status: None   Result Value Ref Range    Hold Specimen [facility]    EKG 12-lead complete w/read - Clinics     Status: None   Result Value Ref Range    Systolic Blood Pressure  mmHg    Diastolic Blood Pressure  mmHg    Ventricular Rate 71 BPM    Atrial Rate 71 BPM    NV Interval 140 ms    QRS Duration 84 ms     ms    QTc 456 ms    P Axis 5 degrees    R AXIS -8 degrees    T Axis 27 degrees    Interpretation ECG       Sinus rhythm  Normal ECG  When compared with ECG of 03-JAN-2023 09:30,  No significant change was found  Confirmed by MD FERNANDA, ZACARIAS ([id number]) on 7/14/2023 4:43:26 PM     CBC and Differential     Status: None    Narrative    The following orders were created for panel order  CBC and Differential.  Procedure                               Abnormality         Status                     ---------                               -----------         ------                     CBC with platelets and d...[[id number]]                      Final result                 Please view results for these tests on the individual orders.

## 2023-07-14 NOTE — PATIENT INSTRUCTIONS
Suspect vasovagal syncope  Hold Blood pressure medication for now, update me with BP readings next week  Increase fluids

## 2023-07-14 NOTE — NURSING NOTE
Patient is here to follow up from recent fall on Tuesday night. States she fainted, did hit nose.  Patient declined questions.     No LMP recorded (lmp unknown). Patient has had a hysterectomy.  Medication Reconciliation: complete      Estefanía Hastings LPN 7/14/2023 10:55 AM       Advance care directive on file? no  Advance care directive provided to patient? no       Estefanía Hastings LPN

## 2023-08-17 DIAGNOSIS — G47.9 DISTURBANCE IN SLEEP BEHAVIOR: ICD-10-CM

## 2023-08-17 RX ORDER — ZOLPIDEM TARTRATE 10 MG/1
TABLET ORAL
Qty: 90 TABLET | Refills: 1 | Status: SHIPPED | OUTPATIENT
Start: 2023-08-17 | End: 2024-01-04

## 2023-08-17 NOTE — TELEPHONE ENCOUNTER
[id number] in [employer] [facility] [place] sent Rx request for the following:      Requested Prescriptions   Pending Prescriptions Disp Refills    zolpidem (AMBIEN) 10 MG tablet [Pharmacy Med Name: ZOLPIDEM TARTRATE 10 MG TABLET] 90 tablet      Sig: TAKE 1 TAB BY MOUTH NIGHTLY AS NEEDED FOR SLEEP   Last Prescription Date:   11/18/22  Last Fill Qty/Refills:         90, R-3    Last Office Visit:              7/14/23   Future Office visit:           None    Ivette Gold RN .............. 8/17/2023  11:00 AM

## 2024-01-04 ENCOUNTER — OFFICE VISIT (OUTPATIENT)
Dept: FAMILY MEDICINE | Facility: OTHER | Age: 74
End: 2024-01-04
Attending: FAMILY MEDICINE
Payer: COMMERCIAL

## 2024-01-04 VITALS
TEMPERATURE: 97.3 F | HEART RATE: 75 BPM | WEIGHT: 150.4 LBS | RESPIRATION RATE: 18 BRPM | HEIGHT: 63 IN | SYSTOLIC BLOOD PRESSURE: 118 MMHG | BODY MASS INDEX: 26.65 KG/M2 | DIASTOLIC BLOOD PRESSURE: 72 MMHG | OXYGEN SATURATION: 96 %

## 2024-01-04 DIAGNOSIS — Z87.898 HISTORY OF PROLONGED Q-T INTERVAL ON ECG: ICD-10-CM

## 2024-01-04 DIAGNOSIS — E78.2 MIXED DYSLIPIDEMIA: ICD-10-CM

## 2024-01-04 DIAGNOSIS — Z01.818 PREOPERATIVE EXAMINATION: Primary | ICD-10-CM

## 2024-01-04 DIAGNOSIS — F33.41 MAJOR DEPRESSIVE DISORDER, RECURRENT EPISODE, IN PARTIAL REMISSION (H): ICD-10-CM

## 2024-01-04 DIAGNOSIS — G47.9 DISTURBANCE IN SLEEP BEHAVIOR: ICD-10-CM

## 2024-01-04 PROBLEM — R13.19 OTHER DYSPHAGIA: Status: RESOLVED | Noted: 2021-06-22 | Resolved: 2024-01-04

## 2024-01-04 PROBLEM — K29.70 GASTRITIS: Status: RESOLVED | Noted: 2021-06-28 | Resolved: 2024-01-04

## 2024-01-04 LAB
ALBUMIN SERPL BCG-MCNC: 4.1 G/DL (ref 3.5–5.2)
ALP SERPL-CCNC: 75 U/L (ref 40–150)
ALT SERPL W P-5'-P-CCNC: 12 U/L (ref 0–50)
ANION GAP SERPL CALCULATED.3IONS-SCNC: 9 MMOL/L (ref 7–15)
AST SERPL W P-5'-P-CCNC: 18 U/L (ref 0–45)
ATRIAL RATE - MUSE: 73 BPM
BILIRUB SERPL-MCNC: 0.6 MG/DL
BUN SERPL-MCNC: 9.4 MG/DL (ref 8–23)
CALCIUM SERPL-MCNC: 9.5 MG/DL (ref 8.8–10.2)
CHLORIDE SERPL-SCNC: 104 MMOL/L (ref 98–107)
CHOLEST SERPL-MCNC: 196 MG/DL
CREAT SERPL-MCNC: 0.84 MG/DL (ref 0.51–0.95)
DEPRECATED HCO3 PLAS-SCNC: 28 MMOL/L (ref 22–29)
DIASTOLIC BLOOD PRESSURE - MUSE: NORMAL MMHG
EGFRCR SERPLBLD CKD-EPI 2021: 73 ML/MIN/1.73M2
FASTING STATUS PATIENT QL REPORTED: YES
GLUCOSE SERPL-MCNC: 94 MG/DL (ref 70–99)
HDLC SERPL-MCNC: 63 MG/DL
INTERPRETATION ECG - MUSE: NORMAL
LDLC SERPL CALC-MCNC: 108 MG/DL
NONHDLC SERPL-MCNC: 133 MG/DL
P AXIS - MUSE: 14 DEGREES
POTASSIUM SERPL-SCNC: 4.2 MMOL/L (ref 3.4–5.3)
PR INTERVAL - MUSE: 132 MS
PROT SERPL-MCNC: 7.5 G/DL (ref 6.4–8.3)
QRS DURATION - MUSE: 74 MS
QT - MUSE: 412 MS
QTC - MUSE: 453 MS
R AXIS - MUSE: -6 DEGREES
SODIUM SERPL-SCNC: 141 MMOL/L (ref 135–145)
SYSTOLIC BLOOD PRESSURE - MUSE: NORMAL MMHG
T AXIS - MUSE: 21 DEGREES
TRIGL SERPL-MCNC: 126 MG/DL
VENTRICULAR RATE- MUSE: 73 BPM

## 2024-01-04 PROCEDURE — 93010 ELECTROCARDIOGRAM REPORT: CPT | Performed by: STUDENT IN AN ORGANIZED HEALTH CARE EDUCATION/TRAINING PROGRAM

## 2024-01-04 PROCEDURE — G0463 HOSPITAL OUTPT CLINIC VISIT: HCPCS

## 2024-01-04 PROCEDURE — 93005 ELECTROCARDIOGRAM TRACING: CPT | Performed by: FAMILY MEDICINE

## 2024-01-04 PROCEDURE — 36415 COLL VENOUS BLD VENIPUNCTURE: CPT | Mod: ZL | Performed by: FAMILY MEDICINE

## 2024-01-04 PROCEDURE — 80053 COMPREHEN METABOLIC PANEL: CPT | Mod: ZL | Performed by: FAMILY MEDICINE

## 2024-01-04 PROCEDURE — 80061 LIPID PANEL: CPT | Mod: ZL | Performed by: FAMILY MEDICINE

## 2024-01-04 PROCEDURE — G0463 HOSPITAL OUTPT CLINIC VISIT: HCPCS | Mod: 25,27

## 2024-01-04 PROCEDURE — 99214 OFFICE O/P EST MOD 30 MIN: CPT | Performed by: FAMILY MEDICINE

## 2024-01-04 RX ORDER — CITALOPRAM HYDROBROMIDE 20 MG/1
20 TABLET ORAL AT BEDTIME
Qty: 90 TABLET | Refills: 3 | Status: SHIPPED | OUTPATIENT
Start: 2024-01-04

## 2024-01-04 RX ORDER — ZOLPIDEM TARTRATE 10 MG/1
TABLET ORAL
Qty: 90 TABLET | Refills: 1 | Status: SHIPPED | OUTPATIENT
Start: 2024-01-04 | End: 2024-08-15

## 2024-01-04 RX ORDER — SIMVASTATIN 10 MG
TABLET ORAL
Qty: 90 TABLET | Refills: 3 | Status: SHIPPED | OUTPATIENT
Start: 2024-01-04

## 2024-01-04 ASSESSMENT — PATIENT HEALTH QUESTIONNAIRE - PHQ9
SUM OF ALL RESPONSES TO PHQ QUESTIONS 1-9: 2
SUM OF ALL RESPONSES TO PHQ QUESTIONS 1-9: 2
10. IF YOU CHECKED OFF ANY PROBLEMS, HOW DIFFICULT HAVE THESE PROBLEMS MADE IT FOR YOU TO DO YOUR WORK, TAKE CARE OF THINGS AT HOME, OR GET ALONG WITH OTHER PEOPLE: NOT DIFFICULT AT ALL

## 2024-01-04 ASSESSMENT — PAIN SCALES - GENERAL: PAINLEVEL: NO PAIN (0)

## 2024-01-04 NOTE — PROGRESS NOTES
[facility]  [address]  [place] [address]  Phone: [phone]  Fax: [phone]  Primary Provider: Joan Mckeon  Pre-op Performing Provider: JOAN MCKEON      PREOPERATIVE EVALUATION:  Today's date: 1/4/2024    Mary is a 73 year old, presenting for the following:        Surgical Information:  Surgery/Procedure: ESOPHAGOGASTRODUODENOSCOPY WITH BRAVO   Surgery Location: [facility]  Surgeon:   Slade Valdes     Surgery Date: 1/9/24  Time of Surgery: tbd  Where patient plans to recover: At home with family  Fax number for surgical facility: [phone]    Assessment & Plan     The proposed surgical procedure is considered LOW risk.    Preoperative examination    - EKG 12-lead complete w/read - Clinics    Major depressive disorder, recurrent episode, in partial remission (H24)  Stable on citalopram  - citalopram (CELEXA) 20 MG tablet; Take 1 tablet (20 mg) by mouth at bedtime    Mixed dyslipidemia  Repeat lipid panel and AST are pending  - simvastatin (ZOCOR) 10 MG tablet; TAKE 1 TABLET BY MOUTH EVERYDAY AT BEDTIME  - Lipid Panel; Future  - Comprehensive Metabolic Panel; Future  - Lipid Panel  - Comprehensive Metabolic Panel    Disturbance in sleep behavior  Discussed risk of long-term use of Ambien given advanced age.  This is providing restful sleep and she said no side effects.  Wishes to proceed  - zolpidem (AMBIEN) 10 MG tablet; TAKE 1 TAB BY MOUTH NIGHTLY AS NEEDED FOR SLEEP    History of prolonged Q-T interval on ECG  QT prolongation has resolved.  This was related to hypokalemia and high-dose citalopram.  No recurrent notes.  - EKG 12-lead complete w/read - Clinics            - No identified additional risk factors other than previously addressed    Antiplatelet or Anticoagulation Medication Instructions:   - Patient is on no antiplatelet or anticoagulation medications.    Additional Medication  Instructions:  Patient is to take all scheduled medications on the day of surgery    RECOMMENDATION:  APPROVAL GIVEN to proceed with proposed procedure, without further diagnostic evaluation.        Anthony Velazquez MD      Subjective       HPI related to upcoming procedure:     72 yo female presents for preoperative evaluation prior to EGD with Bravo.  Patient had robotic hiatal hernia repair with fundoplication done in January 2023.  Currently she is scheduled for repeat EGD.  Patient is no longer requiring PPI or Carafate.    No recent fever, chills, cough or cold symptoms.  She did have an episode of nausea vomiting a few weeks ago which resolved.    Continues to use Ambien at bedtime for restful sleep.    History of QT prolongation related to hypokalemia and high-dose SSRI.  Repeat EKGs have been normal with medication adjustments and correction of hypokalemia.        1/4/2024     9:44 AM   Preop Questions   1. Have you ever had a heart attack or stroke? No   2. Have you ever had surgery on your heart or blood vessels, such as a stent placement, a coronary artery bypass, or surgery on an artery in your head, neck, heart, or legs? No   3. Do you have chest pain with activity? No   4. Do you have a history of  heart failure? No   5. Do you currently have a cold, bronchitis or symptoms of other infection? No   6. Do you have a cough, shortness of breath, or wheezing? No   7. Do you or anyone in your family have previous history of blood clots? No   8. Do you or does anyone in your family have a serious bleeding problem such as prolonged bleeding following surgeries or cuts? No   9. Have you ever had problems with anemia or been told to take iron pills? No   10. Have you had any abnormal blood loss such as black, tarry or bloody stools, or abnormal vaginal bleeding? No   11. Have you ever had a blood transfusion? No   12. Are you willing to have a blood transfusion if it is medically needed before, during, or after  your surgery? Yes   13. Have you or any of your relatives ever had problems with anesthesia? No   14. Do you have sleep apnea, excessive snoring or daytime drowsiness? No   15. Do you have any artifical heart valves or other implanted medical devices like a pacemaker, defibrillator, or continuous glucose monitor? No   16. Do you have artificial joints? No   17. Are you allergic to latex? No       Health Care Directive:  Patient does not have a Health Care Directive or Living Will: Patient states has Advance Directive and will bring in a copy to clinic.    Preoperative Review of :   reviewed - no record of controlled substances prescribed.          Review of Systems  CONSTITUTIONAL: NEGATIVE for fever, chills, change in weight  ENT/MOUTH: NEGATIVE for ear, mouth and throat problems  RESP: NEGATIVE for significant cough or SOB  CV: NEGATIVE for chest pain, palpitations or peripheral edema    Patient Active Problem List    Diagnosis Date Noted    Gastritis 06/28/2021     Priority: Medium    Sliding hiatal hernia 06/28/2021     Priority: Medium    Other dysphagia 06/22/2021     Priority: Medium    History of basal cell carcinoma 07/21/2020     Priority: Medium    MDD (major depressive disorder) 01/31/2018     Priority: Medium    Benign essential hypertension 02/15/2010     Priority: Medium    Sleep disorder 08/01/2007     Priority: Medium    Sigmoid diverticulosis 02/24/2003     Priority: Medium      Past Medical History:   Diagnosis Date    Basal cell carcinoma of skin of nose     No Comments Provided    Hyperlipidemia     lowered zocor 2015    Other gastritis with bleeding 2020    Other specified disorders of bone density and structure, unspecified site (CODE)     11/2014,DEXA,  t score -1.7, ca/vit d    Recurrent major depressive disorder (H)     No Comments Provided    Sleep disorder     No Comments Provided     Past Surgical History:   Procedure Laterality Date    COLONOSCOPY      2003,diverticulosis     COLONOSCOPY  2014    normal, repeat in     ESOPHAGOSCOPY, GASTROSCOPY, DUODENOSCOPY (EGD), COMBINED N/A 2021    Procedure: ESOPHAGOGASTRODUODENOSCOPY, WITH BIOPSY;  Surgeon: Cody Alarcon MD;  Location: [facility] OR    OTHER SURGICAL HISTORY      [id number],PARTIAL HYSTERECTOMY,secondary to DUB. Ovaries in place. was on HRT, stopped patch in 2011     Current Outpatient Medications   Medication Sig Dispense Refill    aspirin 81 MG EC tablet Take 81 mg by mouth daily      Calcium Carb-Cholecalciferol 600-800 MG-UNIT CHEW Take 1 tablet by mouth daily      cholecalciferol 25 MCG (1000 UT) TABS Take 1,000 Units by mouth daily      citalopram (CELEXA) 20 MG tablet Take 1 tablet (20 mg) by mouth At Bedtime 90 tablet 3    fluticasone (FLONASE) 50 MCG/ACT nasal spray USE 1 SPRAY INTO BOTH NOSTRILS AT BEDTIME 48 mL 1    GLUCOSAMINE SULFATE PO Take 1 tablet by mouth daily      KLOR-CON 20 MEQ CR tablet TAKE 1 TABLET BY MOUTH DAILY (Patient not taking: Reported on 2023) 90 tablet 2    metroNIDAZOLE (METROGEL) 0.75 % external gel APPLY TO AFFECTED AREA TWICE A DAY Strength: 0.75 % 45 g 0    omeprazole (PRILOSEC) 20 MG DR capsule Take 1 capsule (20 mg) by mouth 2 times daily Before a meal (Patient not taking: Reported on 2023) 180 capsule 3    simvastatin (ZOCOR) 10 MG tablet TAKE 1 TABLET BY MOUTH EVERYDAY AT BEDTIME 90 tablet 3    sucralfate (CARAFATE) 1 GM tablet TAKE 1 TABLET (1 G) BY MOUTH 2 TIMES DAILY 180 tablet 3    triamcinolone (KENALOG) 0.1 % cream       zolpidem (AMBIEN) 10 MG tablet TAKE 1 TAB BY MOUTH NIGHTLY AS NEEDED FOR SLEEP 90 tablet 1       Allergies   Allergen Reactions    Seasonal Allergies Hives and Itching        Social History     Tobacco Use    Smoking status: Former     Packs/day: 1.00     Years: 20.00     Additional pack years: 0.00     Total pack years: 20.00     Types: Cigarettes     Quit date:      Years since quittin.0     Passive exposure: Past    Smokeless tobacco: Never    Substance Use Topics    Alcohol use: No       History   Drug Use No         Objective     LMP  (LMP Unknown)     Physical Exam  GENERAL APPEARANCE: healthy, alert and no distress  HENT: ear canals and TM's normal and nose and mouth without ulcers or lesions  RESP: lungs clear to auscultation - no rales, rhonchi or wheezes  CV: regular rate and rhythm, normal S1 S2, no S3 or S4 and no murmur, click or rub   ABDOMEN: soft, nontender, no HSM or masses and bowel sounds normal  NEURO: Normal strength and tone, sensory exam grossly normal, mentation intact and speech normal    Recent Labs   Lab Test 07/14/23  1136 01/03/23  0923 11/18/22  1104 10/06/22  1551 03/23/22  1048   HGB 14.4  --   --  13.4  --      --   --  254  --     139   < > 139  --    POTASSIUM 3.6 3.7   < > 3.1*  --    CR 0.83 0.86   < > 0.87  --    A1C  --   --   --   --  5.5    < > = values in this interval not displayed.        Diagnostics:  Labs pending at this time.  Results will be reviewed when available.   EKG: appears normal, NSR, normal axis, normal intervals, no acute ST/T changes c/w ischemia, no LVH by voltage criteria, unchanged from previous tracings    Revised Cardiac Risk Index (RCRI):  The patient has the following serious cardiovascular risks for perioperative complications:   - No serious cardiac risks = 0 points     RCRI Interpretation: 0 points: Class I (very low risk - 0.4% complication rate)         Signed Electronically by: Joan Velazquez MD  Copy of this evaluation report is provided to requesting physician.      Answers submitted by the patient for this visit:  Patient Health Questionnaire (Submitted on 1/4/2024)  If you checked off any problems, how difficult have these problems made it for you to do your work, take care of things at home, or get along with other people?: Not difficult at all  PHQ9 TOTAL SCORE: 2

## 2024-01-04 NOTE — NURSING NOTE
Patient is here for a pre-op for upper EGD with Dr Reed at [facility] in [place] on 1/9/24.     No LMP recorded (lmp unknown). Patient has had a hysterectomy.  Medication Reconciliation: complete    Estefanía Hastings LPN 1/4/2024 9:56 AM       Advance care directive on file? no  Advance care directive provided to patient? no       Estefanía Hastings LPN

## 2024-02-24 ENCOUNTER — HEALTH MAINTENANCE LETTER (OUTPATIENT)
Age: 74
End: 2024-02-24

## 2024-07-27 ENCOUNTER — APPOINTMENT (OUTPATIENT)
Dept: GENERAL RADIOLOGY | Facility: OTHER | Age: 74
End: 2024-07-27
Attending: PHYSICIAN ASSISTANT
Payer: MEDICARE

## 2024-07-27 ENCOUNTER — HOSPITAL ENCOUNTER (EMERGENCY)
Facility: OTHER | Age: 74
Discharge: HOME | End: 2024-07-27
Attending: PHYSICIAN ASSISTANT | Admitting: PHYSICIAN ASSISTANT
Payer: MEDICARE

## 2024-07-27 VITALS
TEMPERATURE: 97.3 F | SYSTOLIC BLOOD PRESSURE: 139 MMHG | OXYGEN SATURATION: 97 % | RESPIRATION RATE: 16 BRPM | DIASTOLIC BLOOD PRESSURE: 85 MMHG | WEIGHT: 150.35 LBS | HEART RATE: 69 BPM | BODY MASS INDEX: 27.06 KG/M2

## 2024-07-27 DIAGNOSIS — T67.1XXA: ICD-10-CM

## 2024-07-27 LAB
ALBUMIN SERPL BCG-MCNC: 3.9 G/DL (ref 3.5–5.2)
ALBUMIN UR-MCNC: NEGATIVE MG/DL
ALP SERPL-CCNC: 78 U/L (ref 40–150)
ALT SERPL W P-5'-P-CCNC: 14 U/L (ref 0–50)
ANION GAP SERPL CALCULATED.3IONS-SCNC: 11 MMOL/L (ref 7–15)
APPEARANCE UR: CLEAR
AST SERPL W P-5'-P-CCNC: 26 U/L (ref 0–45)
BASOPHILS # BLD AUTO: 0.1 10E3/UL (ref 0–0.2)
BASOPHILS NFR BLD AUTO: 1 %
BILIRUB SERPL-MCNC: 0.4 MG/DL
BILIRUB UR QL STRIP: NEGATIVE
BUN SERPL-MCNC: 14 MG/DL (ref 8–23)
CALCIUM SERPL-MCNC: 8.6 MG/DL (ref 8.8–10.4)
CHLORIDE SERPL-SCNC: 105 MMOL/L (ref 98–107)
COLOR UR AUTO: YELLOW
CREAT SERPL-MCNC: 0.8 MG/DL (ref 0.51–0.95)
EGFRCR SERPLBLD CKD-EPI 2021: 78 ML/MIN/1.73M2
EOSINOPHIL # BLD AUTO: 0.1 10E3/UL (ref 0–0.7)
EOSINOPHIL NFR BLD AUTO: 2 %
ERYTHROCYTE [DISTWIDTH] IN BLOOD BY AUTOMATED COUNT: 12.4 % (ref 10–15)
GLUCOSE SERPL-MCNC: 121 MG/DL (ref 70–99)
GLUCOSE UR STRIP-MCNC: NEGATIVE MG/DL
HCO3 SERPL-SCNC: 21 MMOL/L (ref 22–29)
HCT VFR BLD AUTO: 36.1 % (ref 35–47)
HGB BLD-MCNC: 12.2 G/DL (ref 11.7–15.7)
HGB UR QL STRIP: NEGATIVE
IMM GRANULOCYTES # BLD: 0 10E3/UL
IMM GRANULOCYTES NFR BLD: 0 %
KETONES UR STRIP-MCNC: NEGATIVE MG/DL
LEUKOCYTE ESTERASE UR QL STRIP: ABNORMAL
LYMPHOCYTES # BLD AUTO: 1 10E3/UL (ref 0.8–5.3)
LYMPHOCYTES NFR BLD AUTO: 20 %
MCH RBC QN AUTO: 30.9 PG (ref 26.5–33)
MCHC RBC AUTO-ENTMCNC: 33.8 G/DL (ref 31.5–36.5)
MCV RBC AUTO: 91 FL (ref 78–100)
MONOCYTES # BLD AUTO: 0.5 10E3/UL (ref 0–1.3)
MONOCYTES NFR BLD AUTO: 10 %
MUCOUS THREADS #/AREA URNS LPF: PRESENT /LPF
NEUTROPHILS # BLD AUTO: 3.2 10E3/UL (ref 1.6–8.3)
NEUTROPHILS NFR BLD AUTO: 67 %
NITRATE UR QL: NEGATIVE
NRBC # BLD AUTO: 0 10E3/UL
NRBC BLD AUTO-RTO: 0 /100
NT-PROBNP SERPL-MCNC: 150 PG/ML (ref 0–900)
PH UR STRIP: 7 [PH] (ref 5–9)
PLATELET # BLD AUTO: 146 10E3/UL (ref 150–450)
POTASSIUM SERPL-SCNC: 3.8 MMOL/L (ref 3.4–5.3)
PROT SERPL-MCNC: 6.2 G/DL (ref 6.4–8.3)
RBC # BLD AUTO: 3.95 10E6/UL (ref 3.8–5.2)
RBC URINE: 1 /HPF
SODIUM SERPL-SCNC: 137 MMOL/L (ref 135–145)
SP GR UR STRIP: 1.02 (ref 1–1.03)
TROPONIN T SERPL HS-MCNC: <6 NG/L
UROBILINOGEN UR STRIP-MCNC: NORMAL MG/DL
WBC # BLD AUTO: 4.8 10E3/UL (ref 4–11)
WBC URINE: 6 /HPF

## 2024-07-27 PROCEDURE — 85025 COMPLETE CBC W/AUTO DIFF WBC: CPT | Performed by: PHYSICIAN ASSISTANT

## 2024-07-27 PROCEDURE — 99283 EMERGENCY DEPT VISIT LOW MDM: CPT | Performed by: PHYSICIAN ASSISTANT

## 2024-07-27 PROCEDURE — 83880 ASSAY OF NATRIURETIC PEPTIDE: CPT | Mod: GZ | Performed by: PHYSICIAN ASSISTANT

## 2024-07-27 PROCEDURE — 71045 X-RAY EXAM CHEST 1 VIEW: CPT

## 2024-07-27 PROCEDURE — 99285 EMERGENCY DEPT VISIT HI MDM: CPT | Mod: 25 | Performed by: PHYSICIAN ASSISTANT

## 2024-07-27 PROCEDURE — 93005 ELECTROCARDIOGRAM TRACING: CPT | Performed by: PHYSICIAN ASSISTANT

## 2024-07-27 PROCEDURE — 84484 ASSAY OF TROPONIN QUANT: CPT | Performed by: PHYSICIAN ASSISTANT

## 2024-07-27 PROCEDURE — 36415 COLL VENOUS BLD VENIPUNCTURE: CPT | Performed by: PHYSICIAN ASSISTANT

## 2024-07-27 PROCEDURE — 80053 COMPREHEN METABOLIC PANEL: CPT | Performed by: PHYSICIAN ASSISTANT

## 2024-07-27 PROCEDURE — 93010 ELECTROCARDIOGRAM REPORT: CPT | Performed by: INTERNAL MEDICINE

## 2024-07-27 PROCEDURE — 87086 URINE CULTURE/COLONY COUNT: CPT | Performed by: PHYSICIAN ASSISTANT

## 2024-07-27 PROCEDURE — 81001 URINALYSIS AUTO W/SCOPE: CPT | Performed by: PHYSICIAN ASSISTANT

## 2024-07-27 ASSESSMENT — ACTIVITIES OF DAILY LIVING (ADL)
ADLS_ACUITY_SCORE: 35

## 2024-07-27 ASSESSMENT — ENCOUNTER SYMPTOMS
COUGH: 0
HEMATURIA: 0
NAUSEA: 1
CHILLS: 0
SHORTNESS OF BREATH: 0
ABDOMINAL PAIN: 0
FEVER: 0
DYSURIA: 0

## 2024-07-27 ASSESSMENT — COLUMBIA-SUICIDE SEVERITY RATING SCALE - C-SSRS
6. HAVE YOU EVER DONE ANYTHING, STARTED TO DO ANYTHING, OR PREPARED TO DO ANYTHING TO END YOUR LIFE?: NO
2. HAVE YOU ACTUALLY HAD ANY THOUGHTS OF KILLING YOURSELF IN THE PAST MONTH?: NO
1. IN THE PAST MONTH, HAVE YOU WISHED YOU WERE DEAD OR WISHED YOU COULD GO TO SLEEP AND NOT WAKE UP?: NO

## 2024-07-27 NOTE — DISCHARGE INSTRUCTIONS
Get plenty of fluids and rest.  As discussed, all of your lab work appears very stable as does your physical exam and vital signs.  We did discuss that you have slight increase in white blood cells in your urine but without any urinary infectious symptoms it was decided to wait for the culture results prior to treating with antibiotics.  With everything else looking very well, I have a low suspicion that an emergent process caused your syncopal episode today.  I think you had a heat related syncope/exhaustion episode.  See attached information.  Be sure to continue with plenty of fluids and good meals.  Return if there are any worsening or concerning symptoms or follow-up with PCP as needed.

## 2024-07-27 NOTE — ED PROVIDER NOTES
History     Chief Complaint   Patient presents with    Abdominal Pain    Syncope     HPI  Afia Medrano is a 73 year old female who presents to the ED for evaluation of abdominal pain, syncope. Patient went to the gym this morning and was feeling fine. Upon bringing her mother home she started to have stomach cramps and went to the bathroom where she lost consciousness for somewhere between 5 and 15 minutes according to her mother who witnessed the event. Mother also states that she went down slowly and did hit her head. Since arriving the patient feels better other than an upset stomach without nausea.     Allergies:  Allergies   Allergen Reactions    Seasonal Allergies Hives and Itching       Problem List:    Patient Active Problem List    Diagnosis Date Noted    History of prolonged Q-T interval on ECG 01/04/2024     Priority: Medium     due to hypokalemia and higher dose of Celexa, resolved      Sliding hiatal hernia 06/28/2021     Priority: Medium    History of basal cell carcinoma 07/21/2020     Priority: Medium    MDD (major depressive disorder) 01/31/2018     Priority: Medium    Benign essential hypertension 02/15/2010     Priority: Medium    Sleep disorder 08/01/2007     Priority: Medium    Sigmoid diverticulosis 02/24/2003     Priority: Medium        Past Medical History:    Past Medical History:   Diagnosis Date    Basal cell carcinoma of skin of nose     Hyperlipidemia     Other gastritis with bleeding 2020    Recurrent major depressive disorder (H24)     Sleep disorder        Past Surgical History:    Past Surgical History:   Procedure Laterality Date    COLONOSCOPY      2003,diverticulosis    COLONOSCOPY  04/24/2014    normal, repeat in 2024    ESOPHAGOSCOPY, GASTROSCOPY, DUODENOSCOPY (EGD), COMBINED N/A 06/28/2021    Procedure: ESOPHAGOGASTRODUODENOSCOPY, WITH BIOPSY;  Surgeon: Cody Alarcon MD;  Location: [facility] OR    HIATAL HERNIA REPAIR  01/2023    with fundiplication, robotic    OTHER  SURGICAL HISTORY      [id number],PARTIAL HYSTERECTOMY,secondary to DUB. Ovaries in place. was on HRT, stopped patch in 2011       Family History:    Family History   Problem Relation Age of Onset    Diabetes Father         Diabetes    Hypertension Father         Hypertension    Heart Disease Father         Heart Disease    Osteoporosis Mother         Osteoporosis,compression fracture    Heart Disease Mother         Heart Disease    Other - See Comments Other         negative breast cancer    Breast Cancer No family hx of         Cancer-breast       Social History:  Marital Status:   [5]  Social History     Tobacco Use    Smoking status: Former     Current packs/day: 0.00     Average packs/day: 1 pack/day for 20.0 years (20.0 ttl pk-yrs)     Types: Cigarettes     Start date:      Quit date:      Years since quittin.5     Passive exposure: Past    Smokeless tobacco: Never   Vaping Use    Vaping status: Never Used   Substance Use Topics    Alcohol use: No    Drug use: No        Medications:    Calcium Carb-Cholecalciferol 600-800 MG-UNIT CHEW  cholecalciferol 25 MCG (1000 UT) TABS  citalopram (CELEXA) 20 MG tablet  fluticasone (FLONASE) 50 MCG/ACT nasal spray  GLUCOSAMINE SULFATE PO  metroNIDAZOLE (METROGEL) 0.75 % external gel  simvastatin (ZOCOR) 10 MG tablet  triamcinolone (KENALOG) 0.1 % cream  zolpidem (AMBIEN) 10 MG tablet          Review of Systems   Constitutional:  Negative for chills and fever.   HENT:  Negative for congestion.    Eyes:  Negative for visual disturbance.   Respiratory:  Negative for cough and shortness of breath.    Cardiovascular:  Negative for chest pain.   Gastrointestinal:  Positive for nausea. Negative for abdominal pain.   Genitourinary:  Negative for dysuria and hematuria.   Neurological:  Positive for syncope.       Physical Exam   BP: (!) 148/111  Pulse: 67  Temp: 97.3  F (36.3  C)  Resp: 16  Weight: 68.2 kg (150 lb 5.7 oz)  SpO2: 94 %      Physical  Exam  Constitutional:       General: She is not in acute distress.     Appearance: She is well-developed. She is not diaphoretic.   HENT:      Head: Normocephalic and atraumatic.   Eyes:      General: No scleral icterus.     Conjunctiva/sclera: Conjunctivae normal.   Neck:      Vascular: No carotid bruit.   Cardiovascular:      Rate and Rhythm: Normal rate and regular rhythm.   Pulmonary:      Effort: Pulmonary effort is normal.      Breath sounds: Normal breath sounds.   Abdominal:      Palpations: Abdomen is soft.      Tenderness: There is no abdominal tenderness.   Musculoskeletal:         General: No deformity.      Cervical back: Neck supple.      Right lower leg: No edema.      Left lower leg: No edema.   Skin:     General: Skin is warm and dry.      Coloration: Skin is not jaundiced.      Findings: No rash.   Neurological:      Mental Status: She is alert. Mental status is at baseline.   Psychiatric:         Mood and Affect: Mood normal.         Behavior: Behavior normal.         ED Course        Procedures         EKG read at 1614. HR 66, NSR, no ST changes.     Critical Care time:  none               Results for orders placed or performed during the hospital encounter of 07/27/24 (from the past 24 hour(s))   Troponin T, High Sensitivity   Result Value Ref Range    Troponin T, High Sensitivity <6 <=14 ng/L   CBC with platelets differential    Narrative    The following orders were created for panel order CBC with platelets differential.  Procedure                               Abnormality         Status                     ---------                               -----------         ------                     CBC with platelets and d...[[id number]]  Abnormal            Final result                 Please view results for these tests on the individual orders.   Comprehensive metabolic panel   Result Value Ref Range    Sodium 137 135 - 145 mmol/L    Potassium 3.8 3.4 - 5.3 mmol/L    Carbon Dioxide (CO2) 21 (L)  22 - 29 mmol/L    Anion Gap 11 7 - 15 mmol/L    Urea Nitrogen 14.0 8.0 - 23.0 mg/dL    Creatinine 0.80 0.51 - 0.95 mg/dL    GFR Estimate 78 >60 mL/min/1.73m2    Calcium 8.6 (L) 8.8 - 10.4 mg/dL    Chloride 105 98 - 107 mmol/L    Glucose 121 (H) 70 - 99 mg/dL    Alkaline Phosphatase 78 40 - 150 U/L    AST 26 0 - 45 U/L    ALT 14 0 - 50 U/L    Protein Total 6.2 (L) 6.4 - 8.3 g/dL    Albumin 3.9 3.5 - 5.2 g/dL    Bilirubin Total 0.4 <=1.2 mg/dL   Nt probnp inpatient (BNP)   Result Value Ref Range    N terminal Pro BNP Inpatient 150 0 - 900 pg/mL   CBC with platelets and differential   Result Value Ref Range    WBC Count 4.8 4.0 - 11.0 10e3/uL    RBC Count 3.95 3.80 - 5.20 10e6/uL    Hemoglobin 12.2 11.7 - 15.7 g/dL    Hematocrit 36.1 35.0 - 47.0 %    MCV 91 78 - 100 fL    MCH 30.9 26.5 - 33.0 pg    MCHC 33.8 31.5 - 36.5 g/dL    RDW 12.4 10.0 - 15.0 %    Platelet Count 146 (L) 150 - 450 10e3/uL    % Neutrophils 67 %    % Lymphocytes 20 %    % Monocytes 10 %    % Eosinophils 2 %    % Basophils 1 %    % Immature Granulocytes 0 %    NRBCs per 100 WBC 0 <1 /100    Absolute Neutrophils 3.2 1.6 - 8.3 10e3/uL    Absolute Lymphocytes 1.0 0.8 - 5.3 10e3/uL    Absolute Monocytes 0.5 0.0 - 1.3 10e3/uL    Absolute Eosinophils 0.1 0.0 - 0.7 10e3/uL    Absolute Basophils 0.1 0.0 - 0.2 10e3/uL    Absolute Immature Granulocytes 0.0 <=0.4 10e3/uL    Absolute NRBCs 0.0 10e3/uL   UA with Microscopic reflex to Culture    Specimen: Urine, Midstream   Result Value Ref Range    Color Urine Yellow Colorless, Straw, Light Yellow, Yellow    Appearance Urine Clear Clear    Glucose Urine Negative Negative mg/dL    Bilirubin Urine Negative Negative    Ketones Urine Negative Negative mg/dL    Specific Gravity Urine 1.020 1.000 - 1.030    Blood Urine Negative Negative    pH Urine 7.0 5.0 - 9.0    Protein Albumin Urine Negative Negative mg/dL    Urobilinogen Urine Normal Normal, 2.0 mg/dL    Nitrite Urine Negative Negative    Leukocyte Esterase Urine  Moderate (A) Negative    Mucus Urine Present (A) None Seen /LPF    RBC Urine 1 <=2 /HPF    WBC Urine 6 (H) <=5 /HPF    Narrative    Urine Culture ordered based on laboratory criteria   XR Chest Port 1 View    Narrative    XR CHEST PORT 1 VIEW    HISTORY: 73 yearsFemale syncopal epsiode    TECHNIQUE: A single view of the chest was performed    COMPARISON: None    FINDINGS: Heart size and pulmonary vascularity are within normal  limits. Lungs are clear. No consolidating airspace opacities are  present.        Impression    IMPRESSION: Clear chest    ROJAS IRELAND MD         SYSTEM ID:  [id number]       Medications - No data to display    Assessments & Plan (with Medical Decision Making)   Pt nontoxic in NAD. Heart, lung, bowel sounds normal, abd soft, nontender to palpation, nondistended. VSS, afebrile.     Neurologically intact.     Lab work appears well, includes a negative troponin. UA did have moderate luekocyte est, and 6 wbc. She is having no urinary infectious sx's.     She is feeling much improved from when arriving.     From discharge:  As discussed, all of your lab work appears very stable as does your physical exam and vital signs.  We did discuss that you have slight increase in white blood cells in your urine but without any urinary infectious symptoms it was decided to wait for the culture results prior to treating with antibiotics.  With everything else looking very well, I have a low suspicion that an emergent process caused your syncopal episode today.  I think you had a heat related syncope/exhaustion episode.  See attached information.  Be sure to continue with plenty of fluids and good meals.  Return if there are any worsening or concerning symptoms or follow-up with PCP as needed.    Strict return precautions are given to the pt, they will return if symptoms are worsening or concerning. The pt understands and agrees with the plan and they are discharged.     Nilo Epps PA-C    I have  reviewed the nursing notes.    I have reviewed the findings, diagnosis, plan and need for follow up with the patient.          Discharge Medication List as of 7/27/2024  6:05 PM          Final diagnoses:   Heat syncope       7/27/2024   [facility] \[facility]\""Nilo harman PA  07/28/24 [id number]

## 2024-07-27 NOTE — ED TRIAGE NOTES
Patient went to the gym this morning and was feeling fine. Upon bringing her mother home she started to have stomach cramps and went to the bathroom where she  lost consciousness for somewhere between 5 and 15 minutes according to her mother who witnessed the event. Mother also states that she went down slowly and did hit her head. Since arriving the patient feels better other than an upset stomach without nausea.

## 2024-07-29 ENCOUNTER — TELEPHONE (OUTPATIENT)
Dept: FAMILY MEDICINE | Facility: OTHER | Age: 74
End: 2024-07-29
Payer: COMMERCIAL

## 2024-07-29 LAB
ATRIAL RATE - MUSE: 66 BPM
BACTERIA UR CULT: NORMAL
DIASTOLIC BLOOD PRESSURE - MUSE: NORMAL MMHG
INTERPRETATION ECG - MUSE: NORMAL
P AXIS - MUSE: 12 DEGREES
PR INTERVAL - MUSE: 154 MS
QRS DURATION - MUSE: 84 MS
QT - MUSE: 450 MS
QTC - MUSE: 471 MS
R AXIS - MUSE: -3 DEGREES
SYSTOLIC BLOOD PRESSURE - MUSE: NORMAL MMHG
T AXIS - MUSE: 22 DEGREES
VENTRICULAR RATE- MUSE: 66 BPM

## 2024-07-29 NOTE — TELEPHONE ENCOUNTER
Please let patient know her urine culture was negative.  She was not started on antibiotics in the emergency department and would not recommend starting antibiotics this time.    Regina Saha CNP on 7/29/2024 at 5:22 PM

## 2024-07-29 NOTE — TELEPHONE ENCOUNTER
Urine culture was negative for bacteria.  This means that you do not have bacteria in your bladder or kidney causing an infection.  I recommend being seen in the clinic or rapid clinic if you are having persistent or new symptoms to be reevaluated.  Gabrielle Hunt PA-C.......... 7/29/2024  2:32 PM

## 2024-07-29 NOTE — TELEPHONE ENCOUNTER
Patient calling for results of urine culture that was done in ER this weekend and resulted today. She is wondering if medication is needed?    Shantel Schneider LPN on 7/29/2024 at 4:33 PM

## 2024-07-29 NOTE — TELEPHONE ENCOUNTER
Patient was seen in the ER and believes that she has a UTI. Would like antibiotics. States that she had labs done in the ER.     Marla Castellano on 7/29/2024 at 11:42 AM

## 2024-07-29 NOTE — TELEPHONE ENCOUNTER
Patient's concern was address by a different provider already. Closing encounter.     Shantel Schneider LPN on 7/29/2024 at 5:28 PM

## 2024-08-12 DIAGNOSIS — G47.9 DISTURBANCE IN SLEEP BEHAVIOR: ICD-10-CM

## 2024-08-14 NOTE — TELEPHONE ENCOUNTER
[facility] in [employer] sent Rx request for the following:      Requested Prescriptions   Pending Prescriptions Disp Refills    zolpidem (AMBIEN) 10 MG tablet [Pharmacy Med Name: ZOLPIDEM TARTRATE 10 MG TABLET] 90 tablet      Sig: TAKE 1 TABLET BY MOUTH NIGHTLY AS NEEDED FOR SLEEP       There is no refill protocol information for this order          Last Prescription Date:   1/4/24  Last Fill Qty/Refills:         90, R-1    Last Office Visit:              1/4/24   Future Office visit:            None scheduled    Patient is due for a Medicare Wellness Visit.  Will route to scheduling to assist patient with making appointment.    Heidi Mosquera RN on 8/14/2024 at 10:30 AM

## 2024-08-15 RX ORDER — ZOLPIDEM TARTRATE 10 MG/1
TABLET ORAL
Qty: 90 TABLET | Refills: 0 | Status: SHIPPED | OUTPATIENT
Start: 2024-08-15

## 2024-08-22 ENCOUNTER — HOSPITAL ENCOUNTER (OUTPATIENT)
Dept: GENERAL RADIOLOGY | Facility: OTHER | Age: 74
Discharge: HOME | End: 2024-08-22
Payer: MEDICARE

## 2024-08-22 ENCOUNTER — OFFICE VISIT (OUTPATIENT)
Dept: FAMILY MEDICINE | Facility: OTHER | Age: 74
End: 2024-08-22
Payer: MEDICARE

## 2024-08-22 VITALS
SYSTOLIC BLOOD PRESSURE: 144 MMHG | OXYGEN SATURATION: 95 % | BODY MASS INDEX: 28 KG/M2 | TEMPERATURE: 97.5 F | WEIGHT: 158 LBS | DIASTOLIC BLOOD PRESSURE: 86 MMHG | HEIGHT: 63 IN | HEART RATE: 81 BPM | RESPIRATION RATE: 16 BRPM

## 2024-08-22 DIAGNOSIS — S49.91XA SHOULDER INJURY, RIGHT, INITIAL ENCOUNTER: Primary | ICD-10-CM

## 2024-08-22 PROCEDURE — G0463 HOSPITAL OUTPT CLINIC VISIT: HCPCS | Mod: 25

## 2024-08-22 PROCEDURE — 73030 X-RAY EXAM OF SHOULDER: CPT | Mod: RT

## 2024-08-22 PROCEDURE — G0463 HOSPITAL OUTPT CLINIC VISIT: HCPCS

## 2024-08-22 PROCEDURE — 99213 OFFICE O/P EST LOW 20 MIN: CPT

## 2024-08-22 RX ORDER — ACETAMINOPHEN 500 MG
500 TABLET ORAL
COMMUNITY
Start: 2022-12-07

## 2024-08-22 ASSESSMENT — PAIN SCALES - GENERAL: PAINLEVEL: WORST PAIN (10)

## 2024-08-22 NOTE — NURSING NOTE
"Pt presents to  for R shoulder injury. Pt fell on only her R side, she did not hit her head. Ice applied for relief.    Chief Complaint   Patient presents with    Fall     R shoulder       FOOD SECURITY SCREENING QUESTIONS  Hunger Vital Signs:  Within the past 12 months we worried whether our food would run out before we got money to buy more. Never  Within the past 12 months the food we bought just didn't last and we didn't have money to get more. Never  Ivette Dearandyon 8/22/2024 3:00 PM      Initial BP (!) 144/86 (BP Location: Left arm, Patient Position: Sitting, Cuff Size: Adult Regular)   Pulse 81   Temp 97.5  F (36.4  C) (Temporal)   Resp 16   Ht 1.588 m (5' 2.5\")   Wt 71.7 kg (158 lb)   LMP  (LMP Unknown)   SpO2 95%   BMI 28.44 kg/m   Estimated body mass index is 28.44 kg/m  as calculated from the following:    Height as of this encounter: 1.588 m (5' 2.5\").    Weight as of this encounter: 71.7 kg (158 lb).  Medication Reconciliation: complete    Ivette Downey  "

## 2024-08-22 NOTE — PROGRESS NOTES
ASSESSMENT/PLAN:    I have reviewed the nursing notes.  I have reviewed the findings, diagnosis, plan and need for follow up with the patient.    1. Shoulder injury, right, initial encounter  - XR Shoulder Right G/E 3 Views  - ARM SLING, M    Patient presents with a right shoulder injury after falling earlier today and landing on her right side.  Right shoulder x-ray was negative for any fractures or dislocations.  Discussed results with patient in clinic.  Discussed that she may have a right shoulder strain.  Placed patient in an arm sling to wear during the day.  Advised patient to rest her shoulder and use ice. May use over-the-counter Tylenol or ibuprofen PRN.  Gave patient exercises that she can perform as tolerated.  Discussed with patient that if her pain does not improve over the next week or 2 she should follow-up with her PCP as she may need an MRI.    Discussed warning signs/symptoms indicative of need to f/u    Follow up if symptoms persist or worsen or concerns    I explained my diagnostic considerations and recommendations to the patient, who voiced understanding and agreement with the treatment plan. All questions were answered. We discussed potential side effects of any prescribed or recommended therapies, as well as expectations for response to treatments.    Armand Paredes, MIRIAM CNP  8/22/2024  3:10 PM    HPI:    Afia Medrano is a 73 year old female who [facility] for concerns of right shoulder injury    Patient states that she was walking at home and tripped and fell on her right side. This occurred earlier today about an hour ago. She states as she was falling she put out her right arm to catch herself and landed on her right arm and felt her right shoulder pull. She states that her right shoulder is very sore. She put ice on her shoulder right away. She denies hitting her head or LOC. She denies any bruising, redness, or swelling. She denies any prior injuries to  her right shoulder. She denies any numbness or tingling in her right upper extremity.     Past Medical History:   Diagnosis Date    Basal cell carcinoma of skin of nose     No Comments Provided    Hyperlipidemia     lowered zocor     Other gastritis with bleeding     Recurrent major depressive disorder (H24)     No Comments Provided    Sleep disorder     No Comments Provided     Past Surgical History:   Procedure Laterality Date    COLONOSCOPY      ,diverticulosis    COLONOSCOPY  2014    normal, repeat in     ESOPHAGOSCOPY, GASTROSCOPY, DUODENOSCOPY (EGD), COMBINED N/A 2021    Procedure: ESOPHAGOGASTRODUODENOSCOPY, WITH BIOPSY;  Surgeon: Cody Alarcon MD;  Location: [facility] OR    HIATAL HERNIA REPAIR  2023    with fundiplication, robotic    OTHER SURGICAL HISTORY      [id number],PARTIAL HYSTERECTOMY,secondary to DUB. Ovaries in place. was on HRT, stopped patch in 2011     Social History     Tobacco Use    Smoking status: Former     Current packs/day: 0.00     Average packs/day: 1 pack/day for 20.0 years (20.0 ttl pk-yrs)     Types: Cigarettes     Start date:      Quit date:      Years since quittin.6     Passive exposure: Past    Smokeless tobacco: Never   Substance Use Topics    Alcohol use: No     Current Outpatient Medications   Medication Sig Dispense Refill    acetaminophen (TYLENOL) 500 MG tablet Take 500 mg by mouth.      Calcium Carb-Cholecalciferol 600-800 MG-UNIT CHEW Take 1 tablet by mouth daily      cholecalciferol 25 MCG (1000 UT) TABS Take 1,000 Units by mouth daily      citalopram (CELEXA) 20 MG tablet Take 1 tablet (20 mg) by mouth at bedtime 90 tablet 3    fluticasone (FLONASE) 50 MCG/ACT nasal spray USE 1 SPRAY INTO BOTH NOSTRILS AT BEDTIME 48 mL 1    GLUCOSAMINE SULFATE PO Take 1 tablet by mouth daily      metroNIDAZOLE (METROGEL) 0.75 % external gel APPLY TO AFFECTED AREA TWICE A DAY Strength: 0.75 % 45 g 0    simvastatin (ZOCOR) 10 MG tablet TAKE 1 TABLET BY  "MOUTH EVERYDAY AT BEDTIME 90 tablet 3    triamcinolone (KENALOG) 0.1 % cream       zolpidem (AMBIEN) 10 MG tablet TAKE 1 TABLET BY MOUTH NIGHTLY AS NEEDED FOR SLEEP 90 tablet 0     Allergies   Allergen Reactions    Seasonal Allergies Hives and Itching     Past medical history, past surgical history, current medications and allergies reviewed and accurate to the best of my knowledge.      ROS:  Refer to HPI    BP (!) 144/86 (BP Location: Left arm, Patient Position: Sitting, Cuff Size: Adult Regular)   Pulse 81   Temp 97.5  F (36.4  C) (Temporal)   Resp 16   Ht 1.588 m (5' 2.5\")   Wt 71.7 kg (158 lb)   LMP  (LMP Unknown)   SpO2 95%   BMI 28.44 kg/m      EXAM:  General Appearance: Well appearing 73 year old female, appropriate appearance for age. No acute distress   Musculoskeletal:   RIGHT SHOULDER PHYSICAL EXAMINATION:  Gross Examination: shoulders appear symmetrical in appearance, no signs of bony deformity over the AC, clavicle or glenohumeral joints. No signs of previous or current trauma. No signs of ecchymosis. Skin is intact.     Palpation: Tenderness to palpation: AC joint    ROM: flexion, extension, abduction, adduction, IR, ER reduced and painful     Special Testing:   Instability:    Apprehension: negative    Cross Over: negative     Impingement:    Empty Can (supraspinatus): patient unable to perform due to pain    Neurovascular status: distal pulses are intact and are 2+. Two point discrimination intact. Distal capillary refill intact < 3 seconds.     Dermatological: no rashes noted of exposed skin  Neuro: Alert and oriented to person, place, and time.    Psychological: normal affect, alert, oriented, and pleasant.     Xray:  Results for orders placed or performed in visit on 08/22/24   XR Shoulder Right G/E 3 Views     Status: None    Narrative    Exam: XR SHOULDER RIGHT G/E 3 VIEWS    Technique: Right shoulder, 3 Views    Comparison: None.    Exam reason: Shoulder injury, right, initial " encounter    Findings:  No acute fracture or dislocation. Mild acromioclavicular degenerative  change.     Soft tissues appear normal.      Impression    Impression:  No acute fracture or dislocation.    SAMMY MONTANA MD         SYSTEM ID:  [id number]

## 2024-09-09 NOTE — PROGRESS NOTES
Assessment & Plan     1. Shoulder injury, right, initial encounter  Reviewed rapid clinic visit and negative shoulder imaging at the time.  Persistent pain with progression of symptoms to significantly limited range of motion and numbness and tingling throughout right upper extremity.  Concerns for a soft tissue injury such as rotator cuff tear.  Order for MRI for additional evaluation.  Continue with symptomatic management in the meantime.  - MR Shoulder Right w/o Contrast; Future      No follow-ups on file.    Natividad Hernandez is a 73 year old, presenting for the following health issues:  Right shoulder pain     History of Present Illness       Reason for visit:  Shoulder sprain    She eats 2-3 servings of fruits and vegetables daily.She consumes 0 sweetened beverage(s) daily.She exercises with enough effort to increase her heart rate 9 or less minutes per day.  She exercises with enough effort to increase her heart rate 3 or less days per week.   She is taking medications regularly.     Here for rapid clinic follow-up.  Patient was seen in the rapid clinic on 8/22 after a fall in her home landing on her right side.  She tried to catch herself and landed on her right arm and felt right shoulder pull.  Imaging completed at that time was unremarkable.  She was placed in an arm sling for symptomatic management.  Patient reports since then she has continued to have significant pain in her shoulder.  Very limited range of motion of right upper extremity due to pain.  Is now getting some tingling down in her right hand.      PAST MEDICAL HISTORY:   Past Medical History:   Diagnosis Date    Basal cell carcinoma of skin of nose     No Comments Provided    Hyperlipidemia     lowered zocor 2015    Other gastritis with bleeding 2020    Recurrent major depressive disorder (H24)     No Comments Provided    Sleep disorder     No Comments Provided       PAST SURGICAL HISTORY:   Past Surgical History:   Procedure Laterality  Date    COLONOSCOPY      ,diverticulosis    COLONOSCOPY  2014    normal, repeat in     ESOPHAGOSCOPY, GASTROSCOPY, DUODENOSCOPY (EGD), COMBINED N/A 2021    Procedure: ESOPHAGOGASTRODUODENOSCOPY, WITH BIOPSY;  Surgeon: Cody Alarcon MD;  Location: [facility] OR    HIATAL HERNIA REPAIR  2023    with fundiplication, robotic    OTHER SURGICAL HISTORY      [id number],PARTIAL HYSTERECTOMY,secondary to DUB. Ovaries in place. was on HRT, stopped patch in 2011       FAMILY HISTORY:   Family History   Problem Relation Age of Onset    Diabetes Father         Diabetes    Hypertension Father         Hypertension    Heart Disease Father         Heart Disease    Osteoporosis Mother         Osteoporosis,compression fracture    Heart Disease Mother         Heart Disease    Other - See Comments Other         negative breast cancer    Breast Cancer No family hx of         Cancer-breast       SOCIAL HISTORY:   Social History     Tobacco Use    Smoking status: Former     Current packs/day: 0.00     Average packs/day: 1 pack/day for 20.0 years (20.0 ttl pk-yrs)     Types: Cigarettes     Start date:      Quit date:      Years since quittin.7     Passive exposure: Past    Smokeless tobacco: Never   Substance Use Topics    Alcohol use: No        Allergies   Allergen Reactions    Seasonal Allergies Hives and Itching     Current Outpatient Medications   Medication Sig Dispense Refill    acetaminophen (TYLENOL) 500 MG tablet Take 500 mg by mouth.      Calcium Carb-Cholecalciferol 600-800 MG-UNIT CHEW Take 1 tablet by mouth daily      cholecalciferol 25 MCG (1000 UT) TABS Take 1,000 Units by mouth daily      citalopram (CELEXA) 20 MG tablet Take 1 tablet (20 mg) by mouth at bedtime 90 tablet 3    fluticasone (FLONASE) 50 MCG/ACT nasal spray USE 1 SPRAY INTO BOTH NOSTRILS AT BEDTIME 48 mL 1    GLUCOSAMINE SULFATE PO Take 1 tablet by mouth daily      metroNIDAZOLE (METROGEL) 0.75 % external gel APPLY TO AFFECTED AREA  "TWICE A DAY Strength: 0.75 % 45 g 0    simvastatin (ZOCOR) 10 MG tablet TAKE 1 TABLET BY MOUTH EVERYDAY AT BEDTIME 90 tablet 3    triamcinolone (KENALOG) 0.1 % cream       zolpidem (AMBIEN) 10 MG tablet TAKE 1 TABLET BY MOUTH NIGHTLY AS NEEDED FOR SLEEP 90 tablet 0     No current facility-administered medications for this visit.           Objective    /89 (BP Location: Right arm, Patient Position: Sitting, Cuff Size: Adult Regular)   Pulse 87   Temp 97.7  F (36.5  C) (Tympanic)   Resp 16   Ht 1.588 m (5' 2.5\")   Wt 70.5 kg (155 lb 6.4 oz)   LMP  (LMP Unknown)   SpO2 95%   BMI 27.97 kg/m    Body mass index is 27.97 kg/m .  Physical Exam   General: Pleasant, in no apparent distress.  Musculoskeletal: Significant tenderness palpation throughout right shoulder.  Very minimal active range of motion of right shoulder to approximately 10 degrees.  Passive range of motion of right shoulder to approximately 90 degrees.  Remainder of exam limited due to pain.  Skin: No jaundice, pallor, rashes, or lesions.  Psych: Appropriate mood and affect.      Signed Electronically by: Joan Saenz PA-C    "

## 2024-09-10 ENCOUNTER — OFFICE VISIT (OUTPATIENT)
Dept: FAMILY MEDICINE | Facility: OTHER | Age: 74
End: 2024-09-10
Attending: PHYSICIAN ASSISTANT
Payer: COMMERCIAL

## 2024-09-10 VITALS
WEIGHT: 155.4 LBS | BODY MASS INDEX: 27.54 KG/M2 | TEMPERATURE: 97.7 F | HEIGHT: 63 IN | SYSTOLIC BLOOD PRESSURE: 134 MMHG | OXYGEN SATURATION: 95 % | DIASTOLIC BLOOD PRESSURE: 89 MMHG | RESPIRATION RATE: 16 BRPM | HEART RATE: 87 BPM

## 2024-09-10 DIAGNOSIS — S49.91XA SHOULDER INJURY, RIGHT, INITIAL ENCOUNTER: Primary | ICD-10-CM

## 2024-09-10 PROCEDURE — 99213 OFFICE O/P EST LOW 20 MIN: CPT | Performed by: PHYSICIAN ASSISTANT

## 2024-09-10 PROCEDURE — G0463 HOSPITAL OUTPT CLINIC VISIT: HCPCS

## 2024-09-10 ASSESSMENT — PATIENT HEALTH QUESTIONNAIRE - PHQ9
SUM OF ALL RESPONSES TO PHQ QUESTIONS 1-9: 6
SUM OF ALL RESPONSES TO PHQ QUESTIONS 1-9: 6
10. IF YOU CHECKED OFF ANY PROBLEMS, HOW DIFFICULT HAVE THESE PROBLEMS MADE IT FOR YOU TO DO YOUR WORK, TAKE CARE OF THINGS AT HOME, OR GET ALONG WITH OTHER PEOPLE: SOMEWHAT DIFFICULT

## 2024-09-10 ASSESSMENT — PAIN SCALES - GENERAL: PAINLEVEL: SEVERE PAIN (7)

## 2024-09-10 NOTE — NURSING NOTE
"Chief Complaint   Patient presents with    Right shoulder pain      Patient presents to the clinic today for right shoulder pain. Patient states that she was seen for this in [facility] on 8/22/24 but it does not seem to be getting any better.   Initial /89 (BP Location: Right arm, Patient Position: Sitting, Cuff Size: Adult Regular)   Pulse 87   Temp 97.7  F (36.5  C) (Tympanic)   Resp 16   Ht 1.588 m (5' 2.5\")   Wt 70.5 kg (155 lb 6.4 oz)   LMP  (LMP Unknown)   SpO2 95%   BMI 27.97 kg/m   Estimated body mass index is 27.97 kg/m  as calculated from the following:    Height as of this encounter: 1.588 m (5' 2.5\").    Weight as of this encounter: 70.5 kg (155 lb 6.4 oz).  Medication Review: complete    The next two questions are to help us understand your food security.  If you are feeling you need any assistance in this area, we have resources available to support you today.          1/4/2024   SDOH- Food Insecurity   Within the past 12 months, did you worry that your food would run out before you got money to buy more? N   Within the past 12 months, did the food you bought just not last and you didn t have money to get more? N            Health Care Directive:  Patient does not have a Health Care Directive or Living Will: Discussed advance care planning with patient; however, patient declined at this time.    Beronica Shanks      "

## 2024-09-12 ENCOUNTER — HOSPITAL ENCOUNTER (OUTPATIENT)
Dept: MRI IMAGING | Facility: OTHER | Age: 74
Discharge: HOME | End: 2024-09-12
Attending: PHYSICIAN ASSISTANT | Admitting: PHYSICIAN ASSISTANT
Payer: MEDICARE

## 2024-09-12 DIAGNOSIS — S49.91XA SHOULDER INJURY, RIGHT, INITIAL ENCOUNTER: ICD-10-CM

## 2024-09-12 PROCEDURE — 73221 MRI JOINT UPR EXTREM W/O DYE: CPT | Mod: RT,ME

## 2024-09-13 ENCOUNTER — TELEPHONE (OUTPATIENT)
Dept: FAMILY MEDICINE | Facility: OTHER | Age: 74
End: 2024-09-13
Payer: COMMERCIAL

## 2024-09-13 DIAGNOSIS — S42.201D CLOSED FRACTURE OF PROXIMAL END OF RIGHT HUMERUS WITH ROUTINE HEALING, UNSPECIFIED FRACTURE MORPHOLOGY, SUBSEQUENT ENCOUNTER: Primary | ICD-10-CM

## 2024-09-13 NOTE — TELEPHONE ENCOUNTER
Current guidelines recommend the use of narcotics with an acute fracture, meaning in the early days of a fracture, and are not recommended to be used for longer than a few days to a week. She is currently 3 weeks out from her initial injury which is why she would no longer fit within the recommendations. Is she alternating scheduled Tylenol and ibuprofen (I.e. ibuprofen with breakfast, Tylenol a few hours later, ibuprofen with lunch, Tylenol again a few hours later, etc)? If not that is how she should be managing her pain.     *If she reports she has been taking medications as recommended as above and pain is still severe we can consider an alternative medication for pain management.*     Joan Saenz PA-C on 9/13/2024 at 1:47 PM

## 2024-09-13 NOTE — TELEPHONE ENCOUNTER
Patient informed of provider response. She was wondering why it is not recommended to use pain medication past 3 weeks of a fracture. Writer informed patient it is the standard of care and is not recommended. Patient states she does not understand what that means. Can you clarify why they say not to use pain medication past 3 weeks of a fracture?     Elsa Loja CMA on 9/13/2024 at 1:44 PM     no

## 2024-09-13 NOTE — TELEPHONE ENCOUNTER
If patient is not already I encourage her to schedule alternating Tylenol and ibuprofen for pain management. As she is three weeks out from a fracture it is no longer the standard of care to use narcotics, rather alternating OTC medications as above. Referral has been placed. She may seen sports medicine if they are available sooner.   Joan Saenz PA-C on 9/13/2024 at 12:39 PM

## 2024-09-13 NOTE — TELEPHONE ENCOUNTER
Pt would like to get pain medication for broken arm.  Please call.    Won Singletary on 9/13/2024 at 11:28 AM

## 2024-09-13 NOTE — TELEPHONE ENCOUNTER
Patient states she saw Joan Saenz yesterday and she was seen for a break in her humerus. She is requesting pain medication. Patient was wondering how long it will be before she can see orthopedics. Writer informed her she will need to check when they contact her to schedule as that is not a question writer is able to answer. I do not see a referral entered for orthopedics. Referral pending.     Elsa Loja CMA on 9/13/2024 at 12:10 PM

## 2024-09-25 ENCOUNTER — HOSPITAL ENCOUNTER (OUTPATIENT)
Dept: GENERAL RADIOLOGY | Facility: OTHER | Age: 74
Discharge: HOME | End: 2024-09-25
Attending: FAMILY MEDICINE
Payer: MEDICARE

## 2024-09-25 ENCOUNTER — OFFICE VISIT (OUTPATIENT)
Dept: FAMILY MEDICINE | Facility: OTHER | Age: 74
End: 2024-09-25
Attending: PHYSICIAN ASSISTANT
Payer: MEDICARE

## 2024-09-25 VITALS
OXYGEN SATURATION: 96 % | WEIGHT: 152.8 LBS | HEART RATE: 88 BPM | BODY MASS INDEX: 27.5 KG/M2 | TEMPERATURE: 98.3 F | SYSTOLIC BLOOD PRESSURE: 122 MMHG | RESPIRATION RATE: 16 BRPM | DIASTOLIC BLOOD PRESSURE: 78 MMHG

## 2024-09-25 DIAGNOSIS — S42.254A CLOSED NONDISPLACED FRACTURE OF GREATER TUBEROSITY OF RIGHT HUMERUS, INITIAL ENCOUNTER: Primary | ICD-10-CM

## 2024-09-25 PROCEDURE — 73030 X-RAY EXAM OF SHOULDER: CPT | Mod: RT

## 2024-09-25 PROCEDURE — 23620 CLTX GR HMRL TBRS FX WO MNPJ: CPT | Performed by: FAMILY MEDICINE

## 2024-09-25 PROCEDURE — G0463 HOSPITAL OUTPT CLINIC VISIT: HCPCS | Mod: 25

## 2024-09-25 ASSESSMENT — PAIN SCALES - GENERAL: PAINLEVEL: MILD PAIN (2)

## 2024-09-25 NOTE — PROGRESS NOTES
Sports Medicine Office Note    HPI:  73-year-old female coming in for evaluation of a right shoulder injury that occurred on .  She fell to her right side, onto an outstretched arm.  She had immediate pain.  X-rays from that day were negative.  She continued to have pain and was seen in primary care clinic.  An MRI revealed a fracture to the greater tuberosity.  She was placed in a sling.  She comes in today for follow-up.  She notices improvement in her pain, rating it at 3/10.  She characterized the pain as dull.  Lifting is bothersome.  She has been using ice and over-the-counter medications.      EXAM:  /78 (BP Location: Right arm, Patient Position: Sitting, Cuff Size: Adult Regular)   Pulse 88   Temp 98.3  F (36.8  C) (Temporal)   Resp 16   Wt 69.3 kg (152 lb 12.8 oz)   LMP  (LMP Unknown)   SpO2 96%   BMI 27.50 kg/m    MUSCULOSKELETAL EXAM:  RIGHT SHOULDER  Inspection:  -No gross deformity  -No bruising or swelling  -Scars:  None    Tenderness:  - Mild diffuse tenderness to palpation    Other:  -Intact sensation to light touch distally.  -No signs of cyanosis. Normal skin temperature of the upper extremity.  -Elbow:  No gross deformity. Full range of motion.  -Hand/wrist:  No gross deformity. Full range of motion.  -Left shoulder:  No gross deformity. No palpable tenderness. Normal strength.      IMAGIN2024: 4 view right shoulder x-ray  - No fracture, dislocation, or bony lesion    2024: MRI right shoulder  - Bony edema in the humeral head concentrated on the greater tuberosity which reveals a nondisplaced fracture at the rotator cuff attachment.  Tendinopathy within the rotator cuff without tearing/rupture.  Large lipoma in the posterolateral aspect of the shoulder.  No joint effusion.    2024: 3 view right shoulder x-ray  - Greater tuberosity fracture is now better visualized on these radiographs.  Fracture fragment remains nondisplaced.      ASSESSMENT/PLAN:  Diagnoses and  all orders for this visit:  Closed nondisplaced fracture of greater tuberosity of right humerus, initial encounter  -     Orthopedic  Referral  -     XR Shoulder Right 2 Views  -     CLOSED TX HUMERAL TUBEROSITY FX W/O MANIPULATION  -     Physical Therapy  Referral; Future  -     ARM SLING, M    73-year-old female with a closed, nondisplaced greater tuberosity fracture of the right humerus.  X-rays from 8/22 and 9/25 as well as an MRI from 9/12 were all personally reviewed in the office with findings as demonstrated above by my interpretation.  She is now 4 weeks and 6 days into her injury.  She meets criteria for nonoperative management.  - Continue in the sling for another 2 weeks  - Referral placed to physical therapy to begin after transitioning away from the sling  - Follow-up as needed      Renato Gamboa MD  9/25/2024  2:52 PM    Total time spent with this patient was 18 minutes which included chart review, visualization and independent interpretation of images, time spent with the patient, and documentation.    Procedure time:  0 minute(s)

## 2024-09-25 NOTE — NURSING NOTE
Patient is here today for closed fracture of proximal end of right humor consult     DOI 8/22/24  XR 8/22/24  MRI 9/12/24

## 2024-10-09 ENCOUNTER — TELEPHONE (OUTPATIENT)
Dept: FAMILY MEDICINE | Facility: OTHER | Age: 74
End: 2024-10-09
Payer: COMMERCIAL

## 2024-10-09 NOTE — TELEPHONE ENCOUNTER
After proper verification - patient notified of message below.  Patient would like some exercises.  Exercises mailed out per Dr Gamboa

## 2024-10-09 NOTE — TELEPHONE ENCOUNTER
Patient called with questions regarding her PT.  She cannot get into our PT until the first part of November.  She wants to know if it's okay to wait that long or if she should be looking into getting in with a different facility.  Please call to discuss and advise.  Kimberley Arauz on 10/9/2024 at 11:45 AM

## 2024-10-09 NOTE — TELEPHONE ENCOUNTER
She is okay to wait on physical therapy.  She can begin gentle, active range of motion exercises, continuing to advance as pain allows.  With this, by the time she gets into therapy, hopefully she has restored full range of motion and therapy can work on addressing strength and functionality at that time.  Thanks.

## 2024-10-09 NOTE — TELEPHONE ENCOUNTER
LOV 9/25/24  73-year-old female with a closed, nondisplaced greater tuberosity fracture of the right humerus.  X-rays from 8/22 and 9/25 as well as an MRI from 9/12 were all personally reviewed in the office with findings as demonstrated above by my interpretation.  She is now 4 weeks and 6 days into her injury.  She meets criteria for nonoperative management.  - Continue in the sling for another 2 weeks  - Referral placed to physical therapy to begin after transitioning away from the sling  - Follow-up as needed    Ok to wait?

## 2024-11-05 ENCOUNTER — THERAPY VISIT (OUTPATIENT)
Dept: PHYSICAL THERAPY | Facility: OTHER | Age: 74
End: 2024-11-05
Attending: FAMILY MEDICINE
Payer: MEDICARE

## 2024-11-05 DIAGNOSIS — M25.611 DECREASED ROM OF RIGHT SHOULDER: ICD-10-CM

## 2024-11-05 DIAGNOSIS — R29.898 DECREASED STRENGTH OF UPPER EXTREMITY: ICD-10-CM

## 2024-11-05 DIAGNOSIS — M25.511 ACUTE PAIN OF RIGHT SHOULDER: ICD-10-CM

## 2024-11-05 DIAGNOSIS — S42.254A CLOSED NONDISPLACED FRACTURE OF GREATER TUBEROSITY OF RIGHT HUMERUS, INITIAL ENCOUNTER: Primary | ICD-10-CM

## 2024-11-07 ENCOUNTER — THERAPY VISIT (OUTPATIENT)
Dept: PHYSICAL THERAPY | Facility: OTHER | Age: 74
End: 2024-11-07
Attending: FAMILY MEDICINE
Payer: MEDICARE

## 2024-11-07 DIAGNOSIS — M25.611 DECREASED ROM OF RIGHT SHOULDER: ICD-10-CM

## 2024-11-07 DIAGNOSIS — M25.511 ACUTE PAIN OF RIGHT SHOULDER: ICD-10-CM

## 2024-11-07 DIAGNOSIS — R29.898 DECREASED STRENGTH OF UPPER EXTREMITY: ICD-10-CM

## 2024-11-07 DIAGNOSIS — S42.254A CLOSED NONDISPLACED FRACTURE OF GREATER TUBEROSITY OF RIGHT HUMERUS, INITIAL ENCOUNTER: Primary | ICD-10-CM

## 2024-11-07 NOTE — PROGRESS NOTES
PHYSICAL THERAPY EVALUATION  Type of Visit: Evaluation        Fall Risk Screen:  Fall screen completed by: PT  Have you fallen 2 or more times in the past year?: No  Have you fallen and had an injury in the past year?: No  Is patient a fall risk?: No    Subjective patient is a 73-year-old right-hand-dominant female referred to physical therapy after a fall where she tripped on a threshold and had her dog on a leash and fell on an outstretched arm resulting in a fracture on 8/22/2024.  Patient reports some ache at rest 3/10 which increases with use as well as later in the afternoon up to an 8/10.  She is using Tylenol and ibuprofen.  She denies disturbed sleep, ice feels good.  Complaints of pain at the right lateral neck.        Presenting condition or subjective complaint:  Right shoulder pain with decreased range of motion and strength following humeral fracture  Date of onset: 08/22/24    Relevant medical history:   Reviewed through the electronic medical record  Prior diagnostic imaging/testing results:     X-ray      Living Environment  Social support:   Lives alone, has a dog  Type of home:   Split entry    Employment:    Retired  Hobbies/Interests:  Aphiosilting and sewing    Patient goals for therapy:  Return to previous level of function    Pain assessment: Location: Right shoulder/Rating: 3/10     Objective   SHOULDER EVALUATION  PAIN: Pain Level at Rest: 3/10  Pain Level with Use: 8/10  Pain Location: Right shoulder, lateral right neck pain  Pain Quality: Aching and Dull  Pain Frequency: intermittent  Pain is Exacerbated By: Lifting, carrying, certain positions, household tasks  Pain is Relieved By: cold, heat, otc medications, rest, and certain positions  INTEGUMENTARY (edema, incisions): WNL  POSTURE: WFL muscle spasm noted at the right upper trapezii and at the medial border of the scapula  ROM:  Standing AROM right shoulder elevation 105 degrees.  Supine PROM right shoulder flexion 115 degrees, ER= 45  degrees  STRENGTH:  N/A, recent fracture  TREATMENT: Seated:AROM ER bilaterally x 10. Supine: PROM R shoulder flex, Abd, and ER to tolerance. AAROM press up with dowel x 5, AAROM Flex with dowel x3. pain limits further reps. Patient was instructed to perform as tolerated without increasing pain overall.  Given handouts for HEP.    Assessment & Plan   CLINICAL IMPRESSIONS  Medical Diagnosis: Closed nondisplaced fracture of greater tuberosity of right humerus    Treatment Diagnosis: Closed nondisplaced fracture of greater tuberosity of right humerus, decreased ROM right shoulder, decreased strength of upper extremity, acute right shoulder pain   Impression/Assessment: Patient is a 73 year old female with right shoulder and lateral neck pain with decreased range of motion and strength following a right humeral fracture sustained during a fall.  Patient would benefit from physical therapy and shows a good prognosis.  The following significant findings have been identified: Pain, Decreased ROM/flexibility, and Decreased strength. These impairments interfere with their ability to perform self care tasks, recreational activities, and household chores as compared to previous level of function.     Clinical Decision Making (Complexity):  Clinical Presentation: Stable/Uncomplicated  Clinical Presentation Rationale: based on medical and personal factors listed in PT evaluation  Clinical Decision Making (Complexity): Low complexity    PLAN OF CARE  Treatment Interventions:  Interventions: Manual Therapy, Therapeutic Exercise    Long Term Goals     PT Goal 1  Goal Identifier: Pain-Overhead Reaching  Goal Description: Patient will report 0/10 pain at right shoulder to reach overhead for dressing and bathing ADLs.  Target Date: 01/14/25  PT Goal 2  Goal Identifier: ROM  Goal Description: Patient will demonstrate right shoulder ROM to that of the opposite side to reach above head to put away kitchen objects.  Target Date:  01/14/25  PT Goal 3  Goal Identifier: Strength  Goal Description: Patient will be able to lift and carry with the right upper extremity without limitation, returning to her previous level of function demonstrating increased strength.  Target Date: 01/14/25      Frequency of Treatment: 2x/week  Duration of Treatment: 10 weeks    Education Assessment:   Learner/Method: Patient;Listening;Demonstration;Pictures/Video    Risks and benefits of evaluation/treatment have been explained.   Patient/Family/caregiver agrees with Plan of Care.     Evaluation Time:     PT Eval, Low Complexity Minutes (67338): 20       Signing Clinician: Khadijah Sullivan, PT        [facility]                                                                                   OUTPATIENT PHYSICAL THERAPY      PLAN OF TREATMENT FOR OUTPATIENT REHABILITATION   Patient's Last Name, First Name, CAM[facility].JACKIEGina  Alana MitchellAfia  JULIO YOB: 1950   Provider's Name   [facility]   Medical Record No.  [id number]     Onset Date: 08/22/24  Start of Care Date: 11/05/24     Medical Diagnosis:  Closed nondisplaced fracture of greater tuberosity of right humerus      PT Treatment Diagnosis:  Closed nondisplaced fracture of greater tuberosity of right humerus, decreased ROM right shoulder, decreased strength of upper extremity, acute right shoulder pain Plan of Treatment  Frequency/Duration: 2x/week/ 10 weeks    Certification date from 11/05/24 to 01/14/25         See note for plan of treatment details and functional goals     Khadijah Sullivan, PT                         I CERTIFY THE NEED FOR THESE SERVICES FURNISHED UNDER        THIS PLAN OF TREATMENT AND WHILE UNDER MY CARE     (Physician attestation of this document indicates review and certification of the therapy plan).              Referring Provider:  Renato Sahu    Initial Assessment  See Epic Evaluation- Start of Care Date:  11/05/24

## 2024-11-11 ENCOUNTER — THERAPY VISIT (OUTPATIENT)
Dept: PHYSICAL THERAPY | Facility: OTHER | Age: 74
End: 2024-11-11
Attending: FAMILY MEDICINE
Payer: MEDICARE

## 2024-11-11 DIAGNOSIS — M25.611 DECREASED ROM OF RIGHT SHOULDER: ICD-10-CM

## 2024-11-11 DIAGNOSIS — S42.254A CLOSED NONDISPLACED FRACTURE OF GREATER TUBEROSITY OF RIGHT HUMERUS, INITIAL ENCOUNTER: Primary | ICD-10-CM

## 2024-11-11 DIAGNOSIS — R29.898 DECREASED STRENGTH OF UPPER EXTREMITY: ICD-10-CM

## 2024-11-11 DIAGNOSIS — M25.511 ACUTE PAIN OF RIGHT SHOULDER: ICD-10-CM

## 2024-11-14 ENCOUNTER — THERAPY VISIT (OUTPATIENT)
Dept: PHYSICAL THERAPY | Facility: OTHER | Age: 74
End: 2024-11-14
Attending: FAMILY MEDICINE
Payer: COMMERCIAL

## 2024-11-14 DIAGNOSIS — S42.254A CLOSED NONDISPLACED FRACTURE OF GREATER TUBEROSITY OF RIGHT HUMERUS, INITIAL ENCOUNTER: Primary | ICD-10-CM

## 2024-11-18 ENCOUNTER — THERAPY VISIT (OUTPATIENT)
Dept: PHYSICAL THERAPY | Facility: OTHER | Age: 74
End: 2024-11-18
Attending: FAMILY MEDICINE
Payer: MEDICARE

## 2024-11-18 DIAGNOSIS — R29.898 DECREASED STRENGTH OF UPPER EXTREMITY: ICD-10-CM

## 2024-11-18 DIAGNOSIS — S42.254A CLOSED NONDISPLACED FRACTURE OF GREATER TUBEROSITY OF RIGHT HUMERUS, INITIAL ENCOUNTER: Primary | ICD-10-CM

## 2024-11-18 DIAGNOSIS — M25.511 ACUTE PAIN OF RIGHT SHOULDER: ICD-10-CM

## 2024-11-18 DIAGNOSIS — M25.611 DECREASED ROM OF RIGHT SHOULDER: ICD-10-CM

## 2024-12-08 ENCOUNTER — HOSPITAL ENCOUNTER (OUTPATIENT)
Dept: GENERAL RADIOLOGY | Facility: OTHER | Age: 74
Discharge: HOME | End: 2024-12-08
Attending: NURSE PRACTITIONER
Payer: MEDICARE

## 2024-12-08 ENCOUNTER — OFFICE VISIT (OUTPATIENT)
Dept: FAMILY MEDICINE | Facility: OTHER | Age: 74
End: 2024-12-08
Payer: MEDICARE

## 2024-12-08 VITALS
BODY MASS INDEX: 28.26 KG/M2 | TEMPERATURE: 98.8 F | OXYGEN SATURATION: 95 % | HEART RATE: 90 BPM | SYSTOLIC BLOOD PRESSURE: 116 MMHG | RESPIRATION RATE: 16 BRPM | DIASTOLIC BLOOD PRESSURE: 80 MMHG | WEIGHT: 157 LBS

## 2024-12-08 DIAGNOSIS — H92.03 OTALGIA OF BOTH EARS: ICD-10-CM

## 2024-12-08 DIAGNOSIS — R19.7 DIARRHEA, UNSPECIFIED TYPE: ICD-10-CM

## 2024-12-08 DIAGNOSIS — R05.1 ACUTE COUGH: Primary | ICD-10-CM

## 2024-12-08 DIAGNOSIS — R11.0 NAUSEA: ICD-10-CM

## 2024-12-08 DIAGNOSIS — J02.9 SORE THROAT: ICD-10-CM

## 2024-12-08 LAB
FLUAV RNA SPEC QL NAA+PROBE: NEGATIVE
FLUBV RNA RESP QL NAA+PROBE: NEGATIVE
GROUP A STREP BY PCR: NOT DETECTED
RSV RNA SPEC NAA+PROBE: NEGATIVE
SARS-COV-2 RNA RESP QL NAA+PROBE: NEGATIVE

## 2024-12-08 PROCEDURE — 71046 X-RAY EXAM CHEST 2 VIEWS: CPT | Mod: TC

## 2024-12-08 PROCEDURE — 99213 OFFICE O/P EST LOW 20 MIN: CPT | Mod: 24 | Performed by: NURSE PRACTITIONER

## 2024-12-08 PROCEDURE — G0463 HOSPITAL OUTPT CLINIC VISIT: HCPCS | Mod: 25

## 2024-12-08 PROCEDURE — 87637 SARSCOV2&INF A&B&RSV AMP PRB: CPT | Mod: ZL | Performed by: NURSE PRACTITIONER

## 2024-12-08 PROCEDURE — 87651 STREP A DNA AMP PROBE: CPT | Mod: ZL | Performed by: NURSE PRACTITIONER

## 2024-12-08 ASSESSMENT — ENCOUNTER SYMPTOMS
SORE THROAT: 1
EYES NEGATIVE: 1
HEMATOLOGIC/LYMPHATIC NEGATIVE: 1
ACTIVITY CHANGE: 1
MUSCULOSKELETAL NEGATIVE: 1
CHILLS: 1
FATIGUE: 1
GASTROINTESTINAL NEGATIVE: 1
CARDIOVASCULAR NEGATIVE: 1
PSYCHIATRIC NEGATIVE: 1
COUGH: 1

## 2024-12-08 ASSESSMENT — PATIENT HEALTH QUESTIONNAIRE - PHQ9
10. IF YOU CHECKED OFF ANY PROBLEMS, HOW DIFFICULT HAVE THESE PROBLEMS MADE IT FOR YOU TO DO YOUR WORK, TAKE CARE OF THINGS AT HOME, OR GET ALONG WITH OTHER PEOPLE: NOT DIFFICULT AT ALL
SUM OF ALL RESPONSES TO PHQ QUESTIONS 1-9: 0
SUM OF ALL RESPONSES TO PHQ QUESTIONS 1-9: 0

## 2024-12-08 ASSESSMENT — PAIN SCALES - GENERAL: PAINLEVEL_OUTOF10: MODERATE PAIN (4)

## 2024-12-08 NOTE — PROGRESS NOTES
Afia Medrano  1950    ASSESSMENT/PLAN    Presents to rapid clinic with cough, weakness, nausea, fatigue, sore throat dizziness and diarrhea for about 1 week.  Patient has signs of systemic illness.  COVID, influenza, RSV and strep are negative.  Chest x-ray obtained and shows no acute change, no pneumonia.  Patient's vitals are stable and she  appears nontoxic.        1. Acute cough (Primary)  2. Nausea  3. Sore throat  4. Otalgia of both ears  5. Diarrhea  5. Viral Illness       - Influenza A/B, RSV and SARS-CoV2 PCR (COVID-19) Nose  - Group A Streptococcus PCR Throat Swab  - XR Chest 2 Views  - Discussed with patient that symptoms and exam are consistent with viral illness.    - No clinical indications for antibiotic treatment at this time.  - Symptomatic treatment - Encouraged fluids, salt water gargles, honey, humidifier, saline nasal spray, lozenges, tea, soup, smoothies, popsicles, topical vapor rub, rest, etc   - May use over-the-counter Tylenol or ibuprofen PRN  - Follow up as needed for new or worsening symptoms          *Explanation of diagnosis, treatment options and risk and benefits of medications reviewed with patient. Patient agrees with plan of care.  *All questions were answered.    *Red flags symptoms were discussed and patient was advised when they should return for reevaluation or for prompt emergency evaluation.   *Patient was given verbal and written instructions on plan of care. Instructions were printed or are available on [employer] on electronic AVS.   *We discussed potential side effects of any prescribed or recommended therapies, as well as expectations for response to treatments.  *Patient discharged in stable condition    Regina Saha CNP  [facility]    SUBJECTIVE  CHIEF COMPLAINT/ REASON FOR VISIT  Patient presents with:  URI: Productive Cough  x 1 week, weakness, nausea, fatigue, sore throat, earache, dizzy x 1 week     HISTORY OF PRESENT  ILLNESS  Afia Medrano is a pleasant 74 year old female presents to rapid clinic today with cough, weakness, nausea, fatigue, sore throat dizziness and diarrhea for about 1 week.    I have reviewed the nursing notes.  I have reviewed allergies, medication list, problem list, and past medical history.    REVIEW OF SYSTEMS  Review of Systems   Constitutional:  Positive for activity change, chills and fatigue.   HENT:  Positive for congestion, ear pain and sore throat.    Eyes: Negative.    Respiratory:  Positive for cough.    Cardiovascular: Negative.    Gastrointestinal: Negative.    Genitourinary: Negative.    Musculoskeletal: Negative.    Hematological: Negative.    Psychiatric/Behavioral: Negative.     All other systems reviewed and are negative.       VITAL SIGNS  Vitals:    12/08/24 1223   BP: 116/80   Pulse: 90   Resp: 16   Temp: 98.8  F (37.1  C)   TempSrc: Tympanic   SpO2: 95%   Weight: 71.2 kg (157 lb)      Body mass index is 28.26 kg/m .      OBJECTIVE  PHYSICAL EXAM  Physical Exam  Vitals and nursing note reviewed.   Constitutional:       Appearance: Normal appearance.   HENT:      Head: Normocephalic.      Right Ear: Tympanic membrane normal.      Left Ear: Tympanic membrane normal.      Nose: Congestion present.      Mouth/Throat:      Mouth: Mucous membranes are moist.      Pharynx: Posterior oropharyngeal erythema present.   Eyes:      Pupils: Pupils are equal, round, and reactive to light.   Cardiovascular:      Rate and Rhythm: Normal rate and regular rhythm.      Pulses: Normal pulses.      Heart sounds: Normal heart sounds.   Pulmonary:      Effort: Pulmonary effort is normal.      Breath sounds: Normal breath sounds.   Abdominal:      General: Bowel sounds are normal.   Musculoskeletal:         General: Normal range of motion.      Cervical back: Normal range of motion.   Skin:     General: Skin is warm and dry.      Capillary Refill: Capillary refill takes less than 2 seconds.    Neurological:      General: No focal deficit present.      Mental Status: She is alert.            DIAGNOSTICS  Results for orders placed or performed in visit on 12/08/24   XR Chest 2 Views     Status: None    Narrative    PROCEDURE INFORMATION:   Exam: XR Chest   Exam date and time: 12/8/2024 1:02 PM   Age: 74 years old   Clinical indication: Otalgia, bilateral; Acute pharyngitis, unspecified;   Nausea; Diarrhea, unspecified; Acute cough; Other: Diarrhea, unspecified type;   Otalgia of both ears; Sore throat; Nausea; Acute cough     TECHNIQUE:   Imaging protocol: Radiologic exam of the chest.   Views: 2 views.     COMPARISON:   CR XR CHEST PORT 1 VIEW 7/27/2024 4:48 PM     FINDINGS:   Lungs: No evidence of airspace infiltrate. No pulmonary edema.   Pleural spaces: No visible pleural effusion. No pneumothorax.   Heart/Mediastinum: Cardiomediastinal silouhette is within normal limits.   Bones/joints: Pectus carinatum deformity. No evidence of acute osseous   abnormality.       Impression    IMPRESSION:   1.   No acute findings. No evidence of pneumonia.   2.   Pectus carinatum deformity.     THIS DOCUMENT HAS BEEN ELECTRONICALLY SIGNED BY ERIC PATINO MD   Influenza A/B, RSV and SARS-CoV2 PCR (COVID-19) Nose     Status: Normal    Specimen: Nose; Swab   Result Value Ref Range    Influenza A PCR Negative Negative    Influenza B PCR Negative Negative    RSV PCR Negative Negative    SARS CoV2 PCR Negative Negative    Narrative    Testing was performed using the Xpert Xpress CoV2/Flu/RSV Assay on the [employer] GeneXpert Instrument. This test should be ordered for the detection of SARS-CoV2, influenza, and RSV viruses in individuals with signs and symptoms of respiratory tract infection. This test is for in vitro diagnostic use under the [place] FDA for laboratories certified under CLIA to perform high or moderate complexity testing. This test has been [place] FDA cleared. A negative result does not rule out the presence of PCR  inhibitors in the specimen or target RNA in concentration below the limit of detection for the assay. If only one viral target is positive but coinfection with multiple targets is suspected, the sample should be re-tested with another FDA cleared, approved, or authorized test, if coninfection would change clinical management. This test was validated by the [facility]. These laboratories are certified under the Clinical Laboratory Improvement Amendments of 1988 (CLIA-88) as qualified to perfom high complexity laboratory testing.   Group A Streptococcus PCR Throat Swab     Status: Normal    Specimen: Throat; Swab   Result Value Ref Range    Group A strep by PCR Not Detected Not Detected    Narrative    The Xpert Xpress Strep A test, performed on the [employer], is a rapid, qualitative in vitro diagnostic test for the detection of Streptococcus pyogenes (Group A ß-hemolytic Streptococcus, Strep A) in throat swab specimens from patients with signs and symptoms of pharyngitis. The Xpert Xpress Strep A test can be used as an aid in the diagnosis of Group A Streptococcal pharyngitis. The assay is not intended to monitor treatment for Group A Streptococcus infections. The Xpert Xpress Strep A test utilizes an automated real-time polymerase chain reaction (PCR) to detect Streptococcus pyogenes DNA.

## 2024-12-08 NOTE — NURSING NOTE
"Chief Complaint   Patient presents with    URI     Productive Cough  x 1 week, weakness, nausea, fatigue, sore throat, earache, dizzy x 1 week       Initial /80   Pulse 90   Temp 98.8  F (37.1  C) (Tympanic)   Resp 16   Wt 71.2 kg (157 lb)   LMP  (LMP Unknown)   SpO2 95%   Breastfeeding No   BMI 28.26 kg/m   Estimated body mass index is 28.26 kg/m  as calculated from the following:    Height as of 9/10/24: 1.588 m (5' 2.5\").    Weight as of this encounter: 71.2 kg (157 lb).  Medication Review: complete    The next two questions are to help us understand your food security.  If you are feeling you need any assistance in this area, we have resources available to support you today.          1/4/2024   SDOH- Food Insecurity   Within the past 12 months, did you worry that your food would run out before you got money to buy more? N    Within the past 12 months, did the food you bought just not last and you didn t have money to get more? N        Patient-reported         Health Care Directive:  Patient does not have a Health Care Directive: Discussed advance care planning with patient; however, patient declined at this time.    Norma J. Gosselin, LPN      "

## 2024-12-18 ENCOUNTER — THERAPY VISIT (OUTPATIENT)
Dept: PHYSICAL THERAPY | Facility: OTHER | Age: 74
End: 2024-12-18
Attending: FAMILY MEDICINE
Payer: MEDICARE

## 2024-12-18 DIAGNOSIS — M25.611 DECREASED ROM OF RIGHT SHOULDER: ICD-10-CM

## 2024-12-18 DIAGNOSIS — R29.898 DECREASED STRENGTH OF UPPER EXTREMITY: ICD-10-CM

## 2024-12-18 DIAGNOSIS — S42.254A CLOSED NONDISPLACED FRACTURE OF GREATER TUBEROSITY OF RIGHT HUMERUS, INITIAL ENCOUNTER: Primary | ICD-10-CM

## 2024-12-18 DIAGNOSIS — M25.511 ACUTE PAIN OF RIGHT SHOULDER: ICD-10-CM

## 2024-12-30 ENCOUNTER — THERAPY VISIT (OUTPATIENT)
Dept: PHYSICAL THERAPY | Facility: OTHER | Age: 74
End: 2024-12-30
Attending: FAMILY MEDICINE
Payer: MEDICARE

## 2024-12-30 DIAGNOSIS — R29.898 DECREASED STRENGTH OF UPPER EXTREMITY: ICD-10-CM

## 2024-12-30 DIAGNOSIS — M25.511 ACUTE PAIN OF RIGHT SHOULDER: ICD-10-CM

## 2024-12-30 DIAGNOSIS — M25.611 DECREASED ROM OF RIGHT SHOULDER: ICD-10-CM

## 2024-12-30 DIAGNOSIS — S42.254A CLOSED NONDISPLACED FRACTURE OF GREATER TUBEROSITY OF RIGHT HUMERUS, INITIAL ENCOUNTER: Primary | ICD-10-CM

## 2024-12-31 NOTE — PROGRESS NOTES
[facility]                                                                                   OUTPATIENT PHYSICAL THERAPY    PLAN OF TREATMENT FOR OUTPATIENT REHABILITATION   Patient's Last Name, First Name, Afia Villalta YOB: 1950   Provider's Name   [facility]   Medical Record No.  [id number]     Onset Date: 08/22/24  Start of Care Date: 11/05/24     Medical Diagnosis:  Closed nondisplaced fracture of greater tuberosity of right humerus      PT Treatment Diagnosis:  Closed nondisplaced fracture of greater tuberosity of right humerus, decreased ROM right shoulder, decreased strength of upper extremity, acute right shoulder pain Plan of Treatment  Frequency/Duration: 2x/week/ 10 weeks    Certification date from 12/30/24 to 03/10/25         See note for plan of treatment details and functional goals     Khadijah Sullivan PT                         I CERTIFY THE NEED FOR THESE SERVICES FURNISHED UNDER        THIS PLAN OF TREATMENT AND WHILE UNDER MY CARE     (Physician attestation of this document indicates review and certification of the therapy plan).              Referring Provider:  Renato Sahu    Initial Assessment  See Epic Evaluation- Start of Care Date: 11/05/24            PLAN  Continue therapy per current plan of care.     Beginning/End Dates of Progress Note Reporting Period:  11/05/24 to 12/30/2024    Referring Provider:  Renato Sahu     12/30/24 0500   Appointment Info   Signing clinician's name / credentials Khadijah Sullivan DPT   Total/Authorized Visits 10   Visits Used 10/10   Medical Diagnosis Closed nondisplaced fracture of greater tuberosity of right humerus   PT Tx Diagnosis Closed nondisplaced fracture of greater tuberosity of right humerus, decreased ROM right shoulder, decreased strength of upper extremity, acute right shoulder pain   Progress Note/Certification   Start of Care Date 11/05/24    Onset of illness/injury or Date of Surgery 08/22/24   Therapy Frequency 2x/week   Predicted Duration 10 weeks   Certification date from 12/30/24   Certification date to 03/10/25   Progress Note Completed Date 11/05/24   Supervision   PT Assistant Visit Number 1   PT Goal 1   Goal Identifier Pain-Overhead Reaching   Goal Description Patient will report 0/10 pain at right shoulder to reach overhead for dressing and bathing ADLs.   Target Date 03/10/25   PT Goal 2   Goal Identifier ROM   Goal Description Patient will demonstrate right shoulder ROM to that of the opposite side to reach above head to put away kitchen objects.   Target Date 03/10/24   Goal Progress Progressing   PT Goal 3   Goal Identifier Strength   Goal Description Patient will be able to lift and carry with the right upper extremity without limitation, returning to her previous level of function demonstrating increased strength.   Target Date 03/10/25   Goal Progress Progressing   Subjective Report   Subjective Report Pt reports she continues to use her R UE for activities at home. Did reach back to grab something and had some pain, otherwise denies pain at rest or with sleep. Admits hasn't been doing HEP.   Objective Measure 1   Objective Measure Pain   Details 1/10   Objective Measure 2   Objective Measure ROM   Details AROM elevation R=105 degrees   Objective Measure 3   Objective Measure Strength   Details improving, progressed to AROM elevation in standing   Therapeutic Procedure/Exercise   Therapeutic Procedures: strength, endurance, ROM, flexibility minutes (77550) 40   Ther Proc 1 - Details UBE: ROM lvl 1 x 5 mins alt. Supine: PROM ER, Flex, Scap. Sidelying Er R with towel roll 1# x 15-20.  Standing: AROM Scap R x 10 with mirror. Instructed in importance of HEP for full recovery.   Skilled Intervention Striction of exercise and repetitions, manual and verbal cues.   Patient Response/Progress Improving pain and tolerance to activity.  Progressing activity and motion, still limited in IR, ER, and Abd, would benefit from further motion and exercise.   Education   Learner/Method Patient;Listening;Demonstration;Pictures/Video   Plan   Home program Access Code: [email or web address]  URL: [email or web address]  Date: 11/05/2024  Prepared by: Khadijah Sullivan    Exercises  - Supine Shoulder Flexion with Dowel  - 1 x daily - 7 x weekly - 2 sets - 10 reps  - Supine Shoulder Press with Dowel  - 1 x daily - 7 x weekly - 2 sets - 10 reps. Access Code: [email or web address]  URL: [email or web address]  Date: 11/07/2024  Prepared by: Khadijah Sullivan    Exercises  - Seated Scapular Retraction  - 1 x daily - 7 x weekly - 2 sets - 10 reps. Access Code: [email or web address]  URL: [email or web address]  Date: 12/30/2024  Prepared by: Khadijah Laurie    Exercises  - Sidelying Shoulder ER with Towel and Dumbbell  - 1 x daily - 3 x weekly - 2 sets - 10 reps  - Standing Single Arm Shoulder Scaption with Dumbbell  - 1 x daily - 3 x weekly - 2 sets - 10 reps   Plan for next session Continue with ROM for the R shoulder, progress AROM with use of assistance if needed. Continue with manual techniques as needed, progress rotator cuff strengthening as tolerated.   Total Session Time   Timed Code Treatment Minutes 40   Total Treatment Time (sum of timed and untimed services) 40

## 2025-01-08 DIAGNOSIS — E78.2 MIXED DYSLIPIDEMIA: ICD-10-CM

## 2025-01-09 RX ORDER — SIMVASTATIN 10 MG
TABLET ORAL
Qty: 90 TABLET | Refills: 3 | OUTPATIENT
Start: 2025-01-09

## 2025-01-09 NOTE — TELEPHONE ENCOUNTER
[facility] sent Rx request for the following:      Requested Prescriptions   Pending Prescriptions Disp Refills    simvastatin (ZOCOR) 10 MG tablet [Pharmacy Med Name: SIMVASTATIN 10 MG TABLET] 90 tablet 3     Sig: TAKE 1 TABLET BY MOUTH EVERYDAY AT BEDTIME       Antihyperlipidemic agents Failed - 1/9/2025  4:17 PM        Failed - LDL on file in the past 12 months          Last Prescription Date:   1/4/2024  Last Fill Qty/Refills:         90, R-3    Last Office Visit:              1/4/2024   Future Office visit:             Next 5 appointments (look out 90 days)      [place] 10, 2025 3:20 PM  (Arrive by 3:05 PM)  Annual Wellness Visit with Joan Raygoza MD  [facility] ([facility] [address]  [place] [id number] [phone]         Patient has appointment with Dr. Velazquez tomorrow for annual wellness and will get Rx renewals.  Refill not processed  Allie Whitney RN on 1/9/2025 at 4:21 PM

## 2025-01-10 ENCOUNTER — ORDERS ONLY (AUTO-RELEASED) (OUTPATIENT)
Dept: FAMILY MEDICINE | Facility: OTHER | Age: 75
End: 2025-01-10

## 2025-01-10 ENCOUNTER — OFFICE VISIT (OUTPATIENT)
Dept: FAMILY MEDICINE | Facility: OTHER | Age: 75
End: 2025-01-10
Attending: FAMILY MEDICINE
Payer: MEDICARE

## 2025-01-10 VITALS
RESPIRATION RATE: 18 BRPM | OXYGEN SATURATION: 96 % | BODY MASS INDEX: 27.64 KG/M2 | DIASTOLIC BLOOD PRESSURE: 84 MMHG | HEIGHT: 63 IN | TEMPERATURE: 98.5 F | HEART RATE: 75 BPM | WEIGHT: 156 LBS | SYSTOLIC BLOOD PRESSURE: 122 MMHG

## 2025-01-10 DIAGNOSIS — Z12.11 COLON CANCER SCREENING: ICD-10-CM

## 2025-01-10 DIAGNOSIS — Z12.31 ENCOUNTER FOR SCREENING MAMMOGRAM FOR BREAST CANCER: ICD-10-CM

## 2025-01-10 DIAGNOSIS — H69.90 EUSTACHIAN TUBE DISORDER, UNSPECIFIED LATERALITY: ICD-10-CM

## 2025-01-10 DIAGNOSIS — E78.2 MIXED DYSLIPIDEMIA: ICD-10-CM

## 2025-01-10 DIAGNOSIS — G47.9 DISTURBANCE IN SLEEP BEHAVIOR: ICD-10-CM

## 2025-01-10 DIAGNOSIS — Z00.00 MEDICARE ANNUAL WELLNESS VISIT, SUBSEQUENT: Primary | ICD-10-CM

## 2025-01-10 DIAGNOSIS — L71.9 ROSACEA: ICD-10-CM

## 2025-01-10 DIAGNOSIS — R39.15 URINARY URGENCY: ICD-10-CM

## 2025-01-10 DIAGNOSIS — Z78.0 ASYMPTOMATIC MENOPAUSAL STATE: ICD-10-CM

## 2025-01-10 DIAGNOSIS — M85.80 OSTEOPENIA, UNSPECIFIED LOCATION: ICD-10-CM

## 2025-01-10 DIAGNOSIS — F33.41 MAJOR DEPRESSIVE DISORDER, RECURRENT EPISODE, IN PARTIAL REMISSION: ICD-10-CM

## 2025-01-10 DIAGNOSIS — Z23 NEED FOR DIPHTHERIA-TETANUS-PERTUSSIS (TDAP) VACCINE: ICD-10-CM

## 2025-01-10 LAB
ALBUMIN SERPL BCG-MCNC: 4.4 G/DL (ref 3.5–5.2)
ALBUMIN UR-MCNC: NEGATIVE MG/DL
ALP SERPL-CCNC: 110 U/L (ref 40–150)
ALT SERPL W P-5'-P-CCNC: 12 U/L (ref 0–50)
ANION GAP SERPL CALCULATED.3IONS-SCNC: 10 MMOL/L (ref 7–15)
APPEARANCE UR: CLEAR
AST SERPL W P-5'-P-CCNC: 23 U/L (ref 0–45)
BASOPHILS # BLD AUTO: 0.1 10E3/UL (ref 0–0.2)
BASOPHILS NFR BLD AUTO: 1 %
BILIRUB SERPL-MCNC: 0.3 MG/DL
BILIRUB UR QL STRIP: NEGATIVE
BUN SERPL-MCNC: 20.9 MG/DL (ref 8–23)
CALCIUM SERPL-MCNC: 9.3 MG/DL (ref 8.8–10.4)
CHLORIDE SERPL-SCNC: 102 MMOL/L (ref 98–107)
CHOLEST SERPL-MCNC: 282 MG/DL
COLOR UR AUTO: ABNORMAL
CREAT SERPL-MCNC: 0.89 MG/DL (ref 0.51–0.95)
EGFRCR SERPLBLD CKD-EPI 2021: 68 ML/MIN/1.73M2
EOSINOPHIL # BLD AUTO: 0.2 10E3/UL (ref 0–0.7)
EOSINOPHIL NFR BLD AUTO: 3 %
ERYTHROCYTE [DISTWIDTH] IN BLOOD BY AUTOMATED COUNT: 12.2 % (ref 10–15)
FASTING STATUS PATIENT QL REPORTED: NO
FASTING STATUS PATIENT QL REPORTED: NO
GLUCOSE SERPL-MCNC: 92 MG/DL (ref 70–99)
GLUCOSE UR STRIP-MCNC: NEGATIVE MG/DL
HCO3 SERPL-SCNC: 27 MMOL/L (ref 22–29)
HCT VFR BLD AUTO: 40.4 % (ref 35–47)
HDLC SERPL-MCNC: 63 MG/DL
HGB BLD-MCNC: 13.9 G/DL (ref 11.7–15.7)
HGB UR QL STRIP: NEGATIVE
IMM GRANULOCYTES # BLD: 0 10E3/UL
IMM GRANULOCYTES NFR BLD: 0 %
KETONES UR STRIP-MCNC: NEGATIVE MG/DL
LDLC SERPL CALC-MCNC: 151 MG/DL
LEUKOCYTE ESTERASE UR QL STRIP: ABNORMAL
LYMPHOCYTES # BLD AUTO: 2 10E3/UL (ref 0.8–5.3)
LYMPHOCYTES NFR BLD AUTO: 28 %
MCH RBC QN AUTO: 31.2 PG (ref 26.5–33)
MCHC RBC AUTO-ENTMCNC: 34.4 G/DL (ref 31.5–36.5)
MCV RBC AUTO: 91 FL (ref 78–100)
MONOCYTES # BLD AUTO: 0.6 10E3/UL (ref 0–1.3)
MONOCYTES NFR BLD AUTO: 8 %
MUCOUS THREADS #/AREA URNS LPF: PRESENT /LPF
NEUTROPHILS # BLD AUTO: 4.1 10E3/UL (ref 1.6–8.3)
NEUTROPHILS NFR BLD AUTO: 60 %
NITRATE UR QL: NEGATIVE
NONHDLC SERPL-MCNC: 219 MG/DL
NRBC # BLD AUTO: 0 10E3/UL
NRBC BLD AUTO-RTO: 0 /100
PH UR STRIP: 6 [PH] (ref 5–9)
PLATELET # BLD AUTO: 183 10E3/UL (ref 150–450)
POTASSIUM SERPL-SCNC: 4 MMOL/L (ref 3.4–5.3)
PROT SERPL-MCNC: 7.3 G/DL (ref 6.4–8.3)
RBC # BLD AUTO: 4.46 10E6/UL (ref 3.8–5.2)
RBC URINE: 2 /HPF
SODIUM SERPL-SCNC: 139 MMOL/L (ref 135–145)
SP GR UR STRIP: 1.02 (ref 1–1.03)
TRIGL SERPL-MCNC: 341 MG/DL
UROBILINOGEN UR STRIP-MCNC: NORMAL MG/DL
WBC # BLD AUTO: 6.9 10E3/UL (ref 4–11)
WBC URINE: 2 /HPF

## 2025-01-10 PROCEDURE — 85004 AUTOMATED DIFF WBC COUNT: CPT | Mod: ZL | Performed by: FAMILY MEDICINE

## 2025-01-10 PROCEDURE — 81003 URINALYSIS AUTO W/O SCOPE: CPT | Mod: ZL | Performed by: FAMILY MEDICINE

## 2025-01-10 PROCEDURE — 36415 COLL VENOUS BLD VENIPUNCTURE: CPT | Mod: ZL | Performed by: FAMILY MEDICINE

## 2025-01-10 PROCEDURE — 87086 URINE CULTURE/COLONY COUNT: CPT | Mod: ZL | Performed by: FAMILY MEDICINE

## 2025-01-10 PROCEDURE — 85018 HEMOGLOBIN: CPT | Mod: ZL | Performed by: FAMILY MEDICINE

## 2025-01-10 PROCEDURE — 80061 LIPID PANEL: CPT | Mod: ZL | Performed by: FAMILY MEDICINE

## 2025-01-10 PROCEDURE — 80053 COMPREHEN METABOLIC PANEL: CPT | Mod: ZL | Performed by: FAMILY MEDICINE

## 2025-01-10 PROCEDURE — G0463 HOSPITAL OUTPT CLINIC VISIT: HCPCS

## 2025-01-10 RX ORDER — LORATADINE 10 MG/1
10 TABLET ORAL DAILY
COMMUNITY

## 2025-01-10 RX ORDER — ZOLPIDEM TARTRATE 10 MG/1
TABLET ORAL
Qty: 90 TABLET | Refills: 3 | Status: SHIPPED | OUTPATIENT
Start: 2025-01-10

## 2025-01-10 RX ORDER — METRONIDAZOLE 7.5 MG/G
GEL TOPICAL
Qty: 45 G | Refills: 1 | Status: SHIPPED | OUTPATIENT
Start: 2025-01-10

## 2025-01-10 RX ORDER — SIMVASTATIN 10 MG
TABLET ORAL
Qty: 90 TABLET | Refills: 4 | Status: CANCELLED | OUTPATIENT
Start: 2025-01-10

## 2025-01-10 RX ORDER — FLUTICASONE PROPIONATE 50 MCG
1 SPRAY, SUSPENSION (ML) NASAL AT BEDTIME
Qty: 48 ML | Refills: 1 | Status: SHIPPED | OUTPATIENT
Start: 2025-01-10

## 2025-01-10 RX ORDER — CITALOPRAM HYDROBROMIDE 20 MG/1
20 TABLET ORAL AT BEDTIME
Qty: 90 TABLET | Refills: 4 | Status: SHIPPED | OUTPATIENT
Start: 2025-01-10

## 2025-01-10 SDOH — HEALTH STABILITY: PHYSICAL HEALTH: ON AVERAGE, HOW MANY MINUTES DO YOU ENGAGE IN EXERCISE AT THIS LEVEL?: 40 MIN

## 2025-01-10 SDOH — HEALTH STABILITY: PHYSICAL HEALTH: ON AVERAGE, HOW MANY DAYS PER WEEK DO YOU ENGAGE IN MODERATE TO STRENUOUS EXERCISE (LIKE A BRISK WALK)?: 2 DAYS

## 2025-01-10 ASSESSMENT — ANXIETY QUESTIONNAIRES
GAD7 TOTAL SCORE: 0
5. BEING SO RESTLESS THAT IT IS HARD TO SIT STILL: NOT AT ALL
2. NOT BEING ABLE TO STOP OR CONTROL WORRYING: NOT AT ALL
GAD7 TOTAL SCORE: 0
IF YOU CHECKED OFF ANY PROBLEMS ON THIS QUESTIONNAIRE, HOW DIFFICULT HAVE THESE PROBLEMS MADE IT FOR YOU TO DO YOUR WORK, TAKE CARE OF THINGS AT HOME, OR GET ALONG WITH OTHER PEOPLE: NOT DIFFICULT AT ALL
6. BECOMING EASILY ANNOYED OR IRRITABLE: NOT AT ALL
7. FEELING AFRAID AS IF SOMETHING AWFUL MIGHT HAPPEN: NOT AT ALL
7. FEELING AFRAID AS IF SOMETHING AWFUL MIGHT HAPPEN: NOT AT ALL
8. IF YOU CHECKED OFF ANY PROBLEMS, HOW DIFFICULT HAVE THESE MADE IT FOR YOU TO DO YOUR WORK, TAKE CARE OF THINGS AT HOME, OR GET ALONG WITH OTHER PEOPLE?: NOT DIFFICULT AT ALL
3. WORRYING TOO MUCH ABOUT DIFFERENT THINGS: NOT AT ALL
GAD7 TOTAL SCORE: 0
4. TROUBLE RELAXING: NOT AT ALL
1. FEELING NERVOUS, ANXIOUS, OR ON EDGE: NOT AT ALL

## 2025-01-10 ASSESSMENT — PATIENT HEALTH QUESTIONNAIRE - PHQ9
SUM OF ALL RESPONSES TO PHQ QUESTIONS 1-9: 0
10. IF YOU CHECKED OFF ANY PROBLEMS, HOW DIFFICULT HAVE THESE PROBLEMS MADE IT FOR YOU TO DO YOUR WORK, TAKE CARE OF THINGS AT HOME, OR GET ALONG WITH OTHER PEOPLE: NOT DIFFICULT AT ALL
SUM OF ALL RESPONSES TO PHQ QUESTIONS 1-9: 0

## 2025-01-10 ASSESSMENT — PAIN SCALES - GENERAL: PAINLEVEL_OUTOF10: NO PAIN (0)

## 2025-01-10 ASSESSMENT — SOCIAL DETERMINANTS OF HEALTH (SDOH): HOW OFTEN DO YOU GET TOGETHER WITH FRIENDS OR RELATIVES?: THREE TIMES A WEEK

## 2025-01-10 NOTE — PROGRESS NOTES
Preventive Care Visit  [facility] AND [facility]  Joan Velazquez MD, Family Medicine  [place] 10, 2025      Assessment & Plan     Medicare annual wellness visit, subsequent  Patient is counseled on importance of regular self breast exams and mammograms every 1-2 years starting at age 40. Discussed importance of calcium and vitamin D supplementation and osteoporosis screening. Immunizations are updated based on CDC recommendations and patients desire. Reviewed importance of sunscreen, limit sun exposure and monitoring for changing moles with ABCDEs. Recommend seatbelt use and helmets with biking, skiing and ATV/Snowmobile use.      Major depressive disorder, recurrent episode, in partial remission  Has consider decreasing dosage but for now we will continue on citalopram 20 mg daily.  - citalopram (CELEXA) 20 MG tablet; Take 1 tablet (20 mg) by mouth at bedtime.    Disturbance in sleep behavior  Occasional use of Ambien at bedtime.  - zolpidem (AMBIEN) 10 MG tablet; TAKE 1 TABLET BY MOUTH EVERY DAY AT BEDTIME AS NEEDED FOR SLEEP    Mixed dyslipidemia  Repeat lipid panel shows LDL and total cholesterol above goal.  She discontinued simvastatin sometime within the last year.  - Lipid Panel; Future  - Comprehensive Metabolic Panel; Future  - CBC with Platelets & Differential; Future  - Lipid Panel  - Comprehensive Metabolic Panel  - CBC with Platelets & Differential    Eustachian tube disorder, unspecified laterality  Intermittent use of Flonase  - fluticasone (FLONASE) 50 MCG/ACT nasal spray; Spray 1 spray into both nostrils at bedtime.    Rosacea  Continue MetroGel as needed  - metroNIDAZOLE (METROGEL) 0.75 % external gel; APPLY TO AFFECTED AREA TWICE A DAY Strength: 0.75 %    Osteopenia, unspecified location  Recommend repeat bone density scan.  - DX Bone Density; Future    Encounter for screening mammogram for breast cancer  Recommend annual mammogram  - MA Screening Bilateral w/ Aleks; Future    Colon  "cancer screening  Given overall health, recommend continue colon cancer screening.  She will proceed with Cologuard as ordered  - COLOGUARD([employer]); Future    Asymptomatic menopausal state    - DX Bone Density; Future    Urinary urgency  Some increased urinary urgency.  Urinalysis was normal.  - UA Macroscopic with reflex to Microscopic and Culture; Future  - UA Macroscopic with reflex to Microscopic and Culture  - Urine Culture    Need for diphtheria-tetanus-pertussis (Tdap) vaccine  Updated tetanus pertussis booster  - Tdap, tetanus-diptheria-acell pertussis, (BOOSTRIX) 5-2.5-18.5 LF-MCG/0.5 SHIV injection; Inject 0.5 mLs into the muscle once for 1 dose.    Patient has been advised of split billing requirements and indicates understanding: Yes        BMI  Estimated body mass index is 27.81 kg/m  as calculated from the following:    Height as of this encounter: 1.595 m (5' 2.8\").    Weight as of this encounter: 70.8 kg (156 lb).     The longitudinal plan of care for the diagnosis(es)/condition(s) as documented were addressed during this visit. Due to the added complexity in care, I will continue to support Mary in the subsequent management and with ongoing continuity of care.    Counseling  Appropriate preventive services were addressed with this patient via screening, questionnaire, or discussion as appropriate for fall prevention, nutrition, physical activity, Tobacco-use cessation, social engagement, weight loss and cognition.  Checklist reviewing preventive services available has been given to the patient.  Reviewed patient's diet, addressing concerns and/or questions.   She is at risk for lack of exercise and has been provided with information to increase physical activity for the benefit of her well-being.   The patient was provided with written information regarding signs of hearing loss.   Information on urinary incontinence and treatment options given to patient.       Patient Instructions   Check " labs today  Refilled medications  Schedule mammogram and DEXA      No follow-ups on file.    Natividad Hernandez is a 74 year old, presenting for the following:  Medicare Visit    74-year-old man presents for Medicare wellness exam and follow-up of chronic medical problems.  Overall she is feeling well.  Occasionally some increased urinary urgency.  No dysuria or hematuria.  \    She did have a fall earlier this fall.  History of Present Illness       Hypertension: She presents for follow up of hypertension.  She does not check blood pressure  regularly outside of the clinic. Outpatient blood pressures have not been over 140/90. She does not follow a low salt diet.          Depression   How are you doing with your depression since your last visit? No change  Are you having other symptoms that might be associated with depression? No  Have you had a significant life event?  No   Are you feeling anxious or having panic attacks?   No  Do you have any concerns with your use of alcohol or other drugs? No    Social History     Tobacco Use    Smoking status: Former     Current packs/day: 0.00     Average packs/day: 1 pack/day for 20.0 years (20.0 ttl pk-yrs)     Types: Cigarettes     Start date:      Quit date:      Years since quittin.0     Passive exposure: Past    Smokeless tobacco: Never   Vaping Use    Vaping status: Never Used   Substance Use Topics    Alcohol use: No    Drug use: No         9/10/2024     1:55 PM 2024    12:22 PM 1/10/2025     2:52 PM   PHQ   PHQ-9 Total Score 6 0  0    Q9: Thoughts of better off dead/self-harm past 2 weeks Not at all  Not at all  Not at all       Patient-reported    Proxy-reported         2022    10:07 AM 1/3/2023     8:55 AM 1/10/2025     2:52 PM   ALMA-7 SCORE   Total Score 0 (minimal anxiety) 2 (minimal anxiety) 0 (minimal anxiety)   Total Score 0 2 0        Patient-reported         Suicide Assessment Five-step Evaluation and Treatment (SAFE-T)      Health  Care Directive  Patient does not have a Health Care Directive: Discussed advance care planning with patient; however, patient declined at this time.      1/10/2025   General Health   How would you rate your overall physical health? Excellent   Feel stress (tense, anxious, or unable to sleep) Not at all         1/10/2025   Nutrition   Diet: Low salt         1/10/2025   Exercise   Days per week of moderate/strenous exercise 2 days   Average minutes spent exercising at this level 40 min   (!) EXERCISE CONCERN      1/10/2025   Social Factors   Frequency of gathering with friends or relatives Three times a week   Worry food won't last until get money to buy more No   Food not last or not have enough money for food? No   Do you have housing? (Housing is defined as stable permanent housing and does not include staying ouside in a car, in a tent, in an abandoned building, in an overnight shelter, or couch-surfing.) Yes   Are you worried about losing your housing? No   Lack of transportation? No   Unable to get utilities (heat,electricity)? No         1/10/2025   Fall Risk   Fallen 2 or more times in the past year? No    Yes   Trouble with walking or balance? No    No       Multiple values from one day are sorted in reverse-chronological order          1/10/2025   Activities of Daily Living- Home Safety   Needs help with the following daily activites None of the above   Safety concerns in the home None of the above         1/10/2025   Dental   Dentist two times every year? Yes         1/10/2025   Hearing Screening   Hearing concerns? (!) I FEEL THAT PEOPLE ARE MUMBLING OR NOT SPEAKING CLEARLY.         1/10/2025   Driving Risk Screening   Patient/family members have concerns about driving No         1/10/2025   General Alertness/Fatigue Screening   Have you been more tired than usual lately? No         1/10/2025   Urinary Incontinence Screening   Bothered by leaking urine in past 6 months Yes         1/10/2025   TB Screening    Were you born outside of the [place]? No       Today's PHQ-9 Score:       1/10/2025     2:52 PM   PHQ-9 SCORE   PHQ-9 Total Score MyChart 0   PHQ-9 Total Score 0        Patient-reported         1/10/2025   Substance Use   Alcohol more than 3/day or more than 7/wk Not Applicable   Do you have a current opioid prescription? No   How severe/bad is pain from 1 to 10? 0/10 (No Pain)   Do you use any other substances recreationally? No     Social History     Tobacco Use    Smoking status: Former     Current packs/day: 0.00     Average packs/day: 1 pack/day for 20.0 years (20.0 ttl pk-yrs)     Types: Cigarettes     Start date:      Quit date:      Years since quittin.0     Passive exposure: Past    Smokeless tobacco: Never   Vaping Use    Vaping status: Never Used   Substance Use Topics    Alcohol use: No    Drug use: No           2023   LAST FHS-7 RESULTS   1st degree relative breast or ovarian cancer No   Any relative bilateral breast cancer No   Any male have breast cancer No   Any ONE woman have BOTH breast AND ovarian cancer No   Any woman with breast cancer before 50yrs No   2 or more relatives with breast AND/OR ovarian cancer No   2 or more relatives with breast AND/OR bowel cancer Yes        Mammogram Screening - Mammogram every 1-2 years updated in Health Maintenance based on mutual decision making    ASCVD Risk   The 10-year ASCVD risk score (Ronald FOREMAN, et al., 2019) is: 11.8%    Values used to calculate the score:      Age: 74 years      Sex: Female      Is Non- : No      Diabetic: No      Tobacco smoker: No      Systolic Blood Pressure: 122 mmHg      Is BP treated: No      HDL Cholesterol: 63 mg/dL      Total Cholesterol: 196 mg/dL            Reviewed and updated as needed this visit by Provider                    Past Medical History:   Diagnosis Date    Basal cell carcinoma of skin of nose     No Comments Provided    Hyperlipidemia     lowered zocor 2015     Other gastritis with bleeding 2020    Recurrent major depressive disorder     No Comments Provided    Sleep disorder     No Comments Provided     Past Surgical History:   Procedure Laterality Date    COLONOSCOPY      2003,diverticulosis    COLONOSCOPY  04/24/2014    normal, repeat in 2024    ESOPHAGOSCOPY, GASTROSCOPY, DUODENOSCOPY (EGD), COMBINED N/A 06/28/2021    Procedure: ESOPHAGOGASTRODUODENOSCOPY, WITH BIOPSY;  Surgeon: Cody Alarcon MD;  Location: [facility] OR    HIATAL HERNIA REPAIR  01/2023    with fundiplication, robotic    OTHER SURGICAL HISTORY      [id number],PARTIAL HYSTERECTOMY,secondary to DUB. Ovaries in place. was on HRT, stopped patch in 7/2011     Current providers sharing in care for this patient include:  Patient Care Team:  Joan Mckeon MD as PCP - General (Family Practice)  Joan Mckeon MD as Assigned PCP    The following health maintenance items are reviewed in Epic and correct as of today:  Health Maintenance   Topic Date Due    ZOSTER IMMUNIZATION (2 of 3) 01/30/2014    DTAP/TDAP/TD IMMUNIZATION (2 - Td or Tdap) 11/23/2022    MEDICARE ANNUAL WELLNESS VISIT  11/18/2023    COLORECTAL CANCER SCREENING  04/24/2024    INFLUENZA VACCINE (1) 09/01/2024    COVID-19 Vaccine (7 - 2024-25 season) 09/01/2024    LIPID  01/04/2025    MAMMO SCREENING  01/30/2025    PHQ-9  07/10/2025    BMP  07/27/2025    FALL RISK ASSESSMENT  01/10/2026    RSV VACCINE (1 - 1-dose 75+ series) 11/12/2026    GLUCOSE  07/27/2027    ADVANCE CARE PLANNING  12/08/2029    DEXA  01/29/2033    HEPATITIS C SCREENING  Completed    DEPRESSION ACTION PLAN  Completed    Pneumococcal Vaccine: 50+ Years  Completed    HPV IMMUNIZATION  Aged Out    MENINGITIS IMMUNIZATION  Aged Out    RSV MONOCLONAL ANTIBODY  Aged Out         Review of Systems  Constitutional, neuro, ENT, endocrine, pulmonary, cardiac, gastrointestinal, genitourinary, musculoskeletal, integument and psychiatric systems are negative, except as  "otherwise noted.     Objective    Exam  /84   Pulse 75   Temp 98.5  F (36.9  C) (Tympanic)   Resp 18   Ht 1.595 m (5' 2.8\")   Wt 70.8 kg (156 lb)   LMP  (LMP Unknown)   SpO2 96%   Breastfeeding No   BMI 27.81 kg/m     Estimated body mass index is 27.81 kg/m  as calculated from the following:    Height as of this encounter: 1.595 m (5' 2.8\").    Weight as of this encounter: 70.8 kg (156 lb).    Physical Exam  GENERAL: alert and no distress  HENT: ear canals and TM's normal, nose and mouth without ulcers or lesions  NECK: no adenopathy, no asymmetry, masses, or scars  RESP: lungs clear to auscultation - no rales, rhonchi or wheezes  CV: regular rate and rhythm, normal S1 S2, no S3 or S4, no murmur, click or rub, no peripheral edema  ABDOMEN: soft, nontender, no hepatosplenomegaly, no masses and bowel sounds normal  SKIN: no suspicious lesions or rashes  NEURO: Normal strength and tone, mentation intact and speech normal  PSYCH: mentation appears normal, affect normal/bright        1/10/2025   Mini Cog   Clock Draw Score 2 Normal   3 Item Recall 2 objects recalled   Mini Cog Total Score 4              Signed Electronically by: Joan Velazquez MD    "

## 2025-01-10 NOTE — NURSING NOTE
Patient is here for annual medicare wellness, states has been having vertigo, is still doing therapy for her arm.    No LMP recorded (lmp unknown). Patient has had a hysterectomy.  Medication Reconciliation: complete    Estefanía Hastings LPN 1/10/2025 3:09 PM       Advance care directive on file? no  Advance care directive provided to patient? no       Estefanía Hastings LPN

## 2025-01-12 LAB — BACTERIA UR CULT: NORMAL

## 2025-01-30 LAB — NONINV COLON CA DNA+OCC BLD SCRN STL QL: NEGATIVE

## 2025-03-27 ENCOUNTER — HOSPITAL ENCOUNTER (OUTPATIENT)
Dept: MAMMOGRAPHY | Facility: OTHER | Age: 75
Discharge: HOME | End: 2025-03-27
Attending: FAMILY MEDICINE
Payer: MEDICARE

## 2025-03-27 DIAGNOSIS — Z12.31 ENCOUNTER FOR SCREENING MAMMOGRAM FOR BREAST CANCER: ICD-10-CM

## 2025-03-27 PROCEDURE — 77063 BREAST TOMOSYNTHESIS BI: CPT

## 2025-03-27 PROCEDURE — 77067 SCR MAMMO BI INCL CAD: CPT

## 2025-04-11 ENCOUNTER — HOSPITAL ENCOUNTER (OUTPATIENT)
Dept: BONE DENSITY | Facility: OTHER | Age: 75
Discharge: HOME | End: 2025-04-11
Attending: FAMILY MEDICINE | Admitting: FAMILY MEDICINE
Payer: MEDICARE

## 2025-04-11 DIAGNOSIS — M85.80 OSTEOPENIA, UNSPECIFIED LOCATION: ICD-10-CM

## 2025-04-11 DIAGNOSIS — Z78.0 ASYMPTOMATIC MENOPAUSAL STATE: ICD-10-CM

## 2025-04-11 PROCEDURE — 77080 DXA BONE DENSITY AXIAL: CPT

## 2025-04-11 PROCEDURE — 77080 DXA BONE DENSITY AXIAL: CPT | Mod: 26 | Performed by: STUDENT IN AN ORGANIZED HEALTH CARE EDUCATION/TRAINING PROGRAM

## 2025-04-15 PROBLEM — S42.254A CLOSED NONDISPLACED FRACTURE OF GREATER TUBEROSITY OF RIGHT HUMERUS, INITIAL ENCOUNTER: Status: RESOLVED | Noted: 2024-11-05 | Resolved: 2025-04-15

## (undated) DEVICE — ENDO BITE BLOCK 60 MAXI LF 00712804

## (undated) DEVICE — SOL WATER 1500ML

## (undated) DEVICE — Device

## (undated) DEVICE — ENDO KIT COMPLIANCE DYKENDOCMPLY

## (undated) DEVICE — SUCTION MANIFOLD NEPTUNE 2 SYS 4 PORT 0702-020-000

## (undated) DEVICE — TUBING SUCTION 10'X3/16" N510

## (undated) DEVICE — ENDO FORCEP ENDOJAW BIOPSY 2.8MMX230CM FB-220U

## (undated) DEVICE — SYR 50ML LL W/O NDL 309653

## (undated) RX ORDER — LIDOCAINE HYDROCHLORIDE 10 MG/ML
INJECTION, SOLUTION EPIDURAL; INFILTRATION; INTRACAUDAL; PERINEURAL
Status: DISPENSED
Start: 2019-01-31